# Patient Record
Sex: MALE | Race: WHITE | Employment: FULL TIME | URBAN - METROPOLITAN AREA
[De-identification: names, ages, dates, MRNs, and addresses within clinical notes are randomized per-mention and may not be internally consistent; named-entity substitution may affect disease eponyms.]

---

## 2017-01-01 ENCOUNTER — HOSPITAL ENCOUNTER (OUTPATIENT)
Dept: MRI IMAGING | Age: 57
Discharge: HOME | End: 2017-07-31
Attending: UROLOGY
Payer: COMMERCIAL

## 2017-01-01 ENCOUNTER — NURSE NAVIGATOR (OUTPATIENT)
Dept: PALLATIVE CARE | Age: 57
End: 2017-01-01

## 2017-01-01 ENCOUNTER — HOSPITAL ENCOUNTER (OUTPATIENT)
Dept: NUCLEAR MEDICINE | Age: 57
Discharge: HOME | End: 2017-06-30
Attending: UROLOGY
Payer: COMMERCIAL

## 2017-01-01 ENCOUNTER — DOCUMENTATION ONLY (OUTPATIENT)
Dept: PALLATIVE CARE | Age: 57
End: 2017-01-01

## 2017-01-01 ENCOUNTER — HOSPITAL ENCOUNTER (OUTPATIENT)
Dept: PHYSICAL THERAPY | Age: 57
Discharge: HOME | End: 2017-12-20
Payer: COMMERCIAL

## 2017-01-01 ENCOUNTER — TELEPHONE (OUTPATIENT)
Dept: PALLATIVE CARE | Age: 57
End: 2017-01-01

## 2017-01-01 ENCOUNTER — HOSPITAL ENCOUNTER (OUTPATIENT)
Dept: PHYSICAL THERAPY | Age: 57
Discharge: HOME | End: 2017-12-04
Payer: COMMERCIAL

## 2017-01-01 ENCOUNTER — OFFICE VISIT (OUTPATIENT)
Dept: PALLATIVE CARE | Age: 57
End: 2017-01-01

## 2017-01-01 ENCOUNTER — HOSPITAL ENCOUNTER (OUTPATIENT)
Dept: GENERAL RADIOLOGY | Age: 57
Discharge: HOME | End: 2017-12-20
Payer: COMMERCIAL

## 2017-01-01 ENCOUNTER — HOSPITAL ENCOUNTER (OUTPATIENT)
Dept: GENERAL RADIOLOGY | Age: 57
Discharge: HOME | End: 2017-08-09
Payer: COMMERCIAL

## 2017-01-01 ENCOUNTER — HOSPITAL ENCOUNTER (OUTPATIENT)
Dept: ULTRASOUND IMAGING | Age: 57
Discharge: HOME | End: 2017-08-29
Attending: INTERNAL MEDICINE
Payer: COMMERCIAL

## 2017-01-01 ENCOUNTER — HOME HEALTH ADMISSION (OUTPATIENT)
Dept: HOME HEALTH SERVICES | Facility: HOME HEALTH | Age: 57
End: 2017-01-01

## 2017-01-01 VITALS
OXYGEN SATURATION: 99 % | RESPIRATION RATE: 16 BRPM | WEIGHT: 182.6 LBS | DIASTOLIC BLOOD PRESSURE: 64 MMHG | TEMPERATURE: 98 F | SYSTOLIC BLOOD PRESSURE: 110 MMHG | HEIGHT: 74 IN | BODY MASS INDEX: 23.44 KG/M2 | HEART RATE: 72 BPM

## 2017-01-01 VITALS
HEART RATE: 92 BPM | DIASTOLIC BLOOD PRESSURE: 63 MMHG | SYSTOLIC BLOOD PRESSURE: 108 MMHG | TEMPERATURE: 96.9 F | OXYGEN SATURATION: 95 % | BODY MASS INDEX: 23.44 KG/M2 | HEIGHT: 74 IN | RESPIRATION RATE: 16 BRPM | WEIGHT: 182.6 LBS

## 2017-01-01 VITALS — HEIGHT: 74 IN

## 2017-01-01 DIAGNOSIS — K59.00 CONSTIPATION, UNSPECIFIED CONSTIPATION TYPE: Primary | ICD-10-CM

## 2017-01-01 DIAGNOSIS — G89.3 CANCER RELATED PAIN: ICD-10-CM

## 2017-01-01 DIAGNOSIS — D48.3 NEOPLASM OF UNCERTAIN BEHAVIOR OF RETROPERITONEUM AND PERITONEUM: ICD-10-CM

## 2017-01-01 DIAGNOSIS — R60.9 EDEMA, UNSPECIFIED TYPE: ICD-10-CM

## 2017-01-01 DIAGNOSIS — M79.89 LEG SWELLING: Primary | ICD-10-CM

## 2017-01-01 DIAGNOSIS — D48.4 NEOPLASM OF UNCERTAIN BEHAVIOR OF RETROPERITONEUM AND PERITONEUM: ICD-10-CM

## 2017-01-01 DIAGNOSIS — R11.0 NAUSEA: ICD-10-CM

## 2017-01-01 DIAGNOSIS — R93.41 BLADDER FILLING DEFECT: ICD-10-CM

## 2017-01-01 DIAGNOSIS — K59.00 CONSTIPATION, UNSPECIFIED CONSTIPATION TYPE: ICD-10-CM

## 2017-01-01 DIAGNOSIS — C78.7 LIVER METASTASIS (HCC): ICD-10-CM

## 2017-01-01 DIAGNOSIS — N13.5 RETROPERITONEAL FIBROSIS: ICD-10-CM

## 2017-01-01 DIAGNOSIS — N13.5 RPF (RETROPERITONEAL FIBROSIS): ICD-10-CM

## 2017-01-01 DIAGNOSIS — N13.30 HYDRONEPHROSIS: ICD-10-CM

## 2017-01-01 DIAGNOSIS — K56.609 LARGE BOWEL OBSTRUCTION (HCC): ICD-10-CM

## 2017-01-01 DIAGNOSIS — K59.00 CONSTIPATION: ICD-10-CM

## 2017-01-01 DIAGNOSIS — I82.4Y3 ACUTE DEEP VEIN THROMBOSIS (DVT) OF PROXIMAL VEIN OF BOTH LOWER EXTREMITIES (HCC): ICD-10-CM

## 2017-01-01 DIAGNOSIS — G89.29 OTHER CHRONIC PAIN: ICD-10-CM

## 2017-01-01 DIAGNOSIS — R14.0 ABDOMINAL DISTENTION: ICD-10-CM

## 2017-01-01 DIAGNOSIS — R22.40 LOCALIZED SWELLING, MASS, OR LUMP OF LOWER EXTREMITY, UNSPECIFIED LATERALITY: ICD-10-CM

## 2017-01-01 DIAGNOSIS — K52.9 COLITIS, NONSPECIFIC: ICD-10-CM

## 2017-01-01 DIAGNOSIS — R10.84 GENERALIZED ABDOMINAL PAIN: ICD-10-CM

## 2017-01-01 DIAGNOSIS — C15.5 MALIGNANT NEOPLASM OF LOWER THIRD OF ESOPHAGUS (HCC): Primary | ICD-10-CM

## 2017-01-01 DIAGNOSIS — C15.5 MALIGNANT NEOPLASM OF LOWER THIRD OF ESOPHAGUS (HCC): ICD-10-CM

## 2017-01-01 LAB — CREAT BLD-MCNC: 2 MG/DL (ref 0.6–1.3)

## 2017-01-01 PROCEDURE — 74000 XR ABD (KUB): CPT

## 2017-01-01 PROCEDURE — 97140 MANUAL THERAPY 1/> REGIONS: CPT

## 2017-01-01 PROCEDURE — 78708 K FLOW/FUNCT IMAGE W/DRUG: CPT

## 2017-01-01 PROCEDURE — 74020 XR ABD (AP AND ERECT OR DECUB): CPT

## 2017-01-01 PROCEDURE — 97016 VASOPNEUMATIC DEVICE THERAPY: CPT

## 2017-01-01 PROCEDURE — 72197 MRI PELVIS W/O & W/DYE: CPT

## 2017-01-01 PROCEDURE — 93971 EXTREMITY STUDY: CPT

## 2017-01-01 PROCEDURE — 74011250636 HC RX REV CODE- 250/636

## 2017-01-01 PROCEDURE — A9577 INJ MULTIHANCE: HCPCS | Performed by: UROLOGY

## 2017-01-01 PROCEDURE — 74011250636 HC RX REV CODE- 250/636: Performed by: UROLOGY

## 2017-01-01 PROCEDURE — 82565 ASSAY OF CREATININE: CPT

## 2017-01-01 PROCEDURE — 97162 PT EVAL MOD COMPLEX 30 MIN: CPT

## 2017-01-01 PROCEDURE — 74183 MRI ABD W/O CNTR FLWD CNTR: CPT

## 2017-01-01 RX ORDER — FUROSEMIDE 40 MG/1
1 TABLET ORAL DAILY
Refills: 2 | COMMUNITY
Start: 2017-01-01 | End: 2017-01-01

## 2017-01-01 RX ORDER — FUROSEMIDE 10 MG/ML
20 INJECTION INTRAMUSCULAR; INTRAVENOUS ONCE
Status: COMPLETED | OUTPATIENT
Start: 2017-01-01 | End: 2017-01-01

## 2017-01-01 RX ORDER — OXYCODONE HYDROCHLORIDE 30 MG/1
1 TABLET ORAL
Refills: 0 | COMMUNITY
Start: 2017-01-01 | End: 2018-01-01

## 2017-01-01 RX ORDER — OXYCODONE HCL 20 MG/1
1 TABLET, FILM COATED, EXTENDED RELEASE ORAL EVERY 8 HOURS
Refills: 0 | COMMUNITY
Start: 2017-01-01 | End: 2018-01-01

## 2017-01-01 RX ORDER — OXYCODONE HYDROCHLORIDE 80 MG/1
1 TABLET, FILM COATED, EXTENDED RELEASE ORAL EVERY 8 HOURS
Refills: 0 | COMMUNITY
Start: 2017-01-01 | End: 2018-01-01

## 2017-01-01 RX ORDER — AMOXICILLIN 250 MG
2 CAPSULE ORAL 2 TIMES DAILY
Qty: 120 TAB | Refills: 5 | Status: SHIPPED | OUTPATIENT
Start: 2017-01-01 | End: 2018-01-01

## 2017-01-01 RX ORDER — FUROSEMIDE 10 MG/ML
INJECTION INTRAMUSCULAR; INTRAVENOUS
Status: COMPLETED
Start: 2017-01-01 | End: 2017-01-01

## 2017-01-01 RX ORDER — APIXABAN 5 MG/1
1 TABLET, FILM COATED ORAL 2 TIMES DAILY
Refills: 5 | COMMUNITY
Start: 2017-01-01 | End: 2018-01-01 | Stop reason: SDUPTHER

## 2017-01-01 RX ADMIN — GADOBENATE DIMEGLUMINE 17 ML: 529 INJECTION, SOLUTION INTRAVENOUS at 20:09

## 2017-01-01 RX ADMIN — FUROSEMIDE 20 MG: 10 INJECTION INTRAMUSCULAR; INTRAVENOUS at 12:33

## 2017-01-01 RX ADMIN — FUROSEMIDE 20 MG: 10 INJECTION, SOLUTION INTRAMUSCULAR; INTRAVENOUS at 12:33

## 2017-02-17 ENCOUNTER — HOSPITAL ENCOUNTER (OUTPATIENT)
Dept: ULTRASOUND IMAGING | Age: 57
Discharge: HOME | End: 2017-02-17
Attending: INTERNAL MEDICINE
Payer: COMMERCIAL

## 2017-02-17 DIAGNOSIS — M79.669 PAIN AND SWELLING OF LOWER LEG: ICD-10-CM

## 2017-02-17 DIAGNOSIS — M79.89 PAIN AND SWELLING OF LOWER LEG: ICD-10-CM

## 2017-02-17 PROCEDURE — 93970 EXTREMITY STUDY: CPT

## 2017-09-29 NOTE — PROGRESS NOTES
[facility]  Nursing Note  [phone]  Fax [phone]      Name:  Taylor Villalta  YOB: 1960     We received an outpatient Palliative Medicine referral for Taylor Villalta on 9/29/17  from [facility], MD, Oncology, [place] [address] Oncology. We appreciate this referral.      The patient's case has been reviewed and __x__ Meets / ____ Does not Meet  the following criteria for a initial provider visit:     ____   Care Decisions (including discussions about treatment choices and advance care planning)  __x__   Overwhelming Symptoms (such as pain, fatigue, nausea, loss of appetite)  ____   Psychosocial and spiritual support for patients and families with distress from advanced illness      This patient also ___ Meets / __x_ Does not Meet a priority referral for:     ___   Post discharge follow up  ___   High risk health care needs     This patient's referring provider has been notified of above.        PATIENT DEMOGRAPHICS     Taylor Villalta   [address], [address] [place] [address]  [phone] (wife Laura Sahni)  1960     PRIMARY CARE PROVIDER:  Name: Alan Perry MD    [facility] SOURCE CONTACT INFORMATION:  Name: Dr. [facility], [facility], GI Medical Oncology  Phone Number: [phone]     REASON FOR REFERRAL:  Esophageal cancer with liver mets, large bowel obstruction 9/13/17 (ileostomy 9/15/17), retroperitoneal fibrosis with chronic pain    CURRENT SYMPTOMS:  Abdominal pain    DATE OF MOST RECENT CONTACT WITH A PROVIDER:  Hospitalized at [facility] 9/13-9/27/17    [facility]? Yes, [facility] (admitted 9/27/17)    ACP: none on file    NOTES:  Faxed referral said to contact pt's wife Laura Sahni to schedule the appt. Nurse called and spoke with wife, introduced Palliative Medicine services.   Wife reports that pt has had a complicated course with retroperitoneal fibrosis and pain, and his case was presented to the tumor board at [facility]. Pt now wishes to follow up with Palliative Medicine at [facility] because it is closer to where he lives.     Appt scheduled 10/18/17, 11:30 am at [facility] location with Dr. Jossie Gallegos, MARINAN, RN  Clinical Referral Navigator

## 2017-11-07 NOTE — TELEPHONE ENCOUNTER
Called patient to advise/confirm upcoming appt with Dr. Dr. Monet  on 11/08/17 [facility] 11am Also advised to please bring in patients photo ID/[employer] card and any current pain medication patient is taking to bring in the container with the medication in it.

## 2017-11-08 NOTE — PATIENT INSTRUCTIONS
Dear Layvonne Ormond ,    It was a pleasure seeing you in the Palliative Medicine Office today. This is what we talked about:     1. History  -We reviewed your history in detail, particular of Polyfibromatosis Syndrome  -I mentioned that this reminded me of a patient I see with Erdheim-Adan disease  -You have had metastatic esophageal cancer, for the past year  -This has been responsive to chemotherapy including Herceptin  -The last several months have been extremely difficult with the bilateral nephrostomy tubes as well as the ileostomy  -You are settling into these changes but it is difficult    2. Pain  -You have had chronic pain your entire life with the Polyfibromatosis Syndrome  -You have tried numerous pain therapies  -Currently you are on Oxycontin 100mg every 8 hours and Oxycodone IR 30mg every 4 hours  -The Oxycontin was just increased a few weeks ago  -This is currently managed by Dr. Cuong Valadez    3. Swelling  -The swelling in your legs is a more recent development  -You have a clot in the left leg but the right appears to have resolved  -You are on a blood thinner at this time  -There may be both a blood vessel and lymphatic cause to the swelling  -I am recommending that the Lymphedema Clinic evaluate you and make recommendations  -You are on a diuretic, though need to be careful with this as it can deplete the intravascular volume if the dose is high for an extended period of time  -We discussed possibly getting a bed that adjusts your head and feet for improved comfort with the swelling you have    4.  Advance Care Planning  -We will talk more about this in an upcoming visit    This is what you have shared with us about Alban Thomas. Planning 11/8/2017   Patient's Healthcare Decision Maker is: Verbal statement (Legal Next of Kin remains as decision maker)   Primary Decision Maker Name Bianka [place]   Primary Decision Maker Phone Number [phone]   Primary Decision Maker Relationship to Patient Spouse     5. Palliative Medicine  -We talked about the role of Palliative Medicine  -I will be obtaining and reviewing more records and speaking with several of your doctors      The Palliative Medicine Team is here to support you and your family. We will see you again in about a month    If there are any concerns before that time, such as medication questions, worsening symptoms or a need to see a physician for an urgent or emergent situation; please call [phone] (COPE). A physician is also on call after our normal business hours of 8am to 5pm.     In order to serve you better, please allow up to 48 hours for prescription refills to be processed. Certain medications may require more paperwork or a written prescription that you may need to  from the office. We appreciate you letting us know of any refill requests as soon as possible. We also would like you to sign up for [employer] as well. Sincerely,      Esther Oliver MD and the Palliative Medicine Team         Lymphedema: Care Instructions  Your Care Instructions  Lymphedema is fluid that builds up in the arms or legs. It is often caused by surgery to remove lymph nodes during cancer treatment, especially breast cancer surgery, which can cause fluid to build up in the arm. It can happen after radiation treatment to an area that involves lymph nodes. It also can be caused by a fractured bone or surgery to fix a fracture. And some medicines also can cause lymphedema. Some people get it for unknown reasons. Normally, lymph nodes trap bacteria and other substances as fluid flows through them. Then, the white cells in the body's defense, or immune, system can destroy the substances. But if there are few or no lymph nodes-or if the lymph system in an arm or leg has been damaged-fluid can build up in the affected arm or leg. You can take simple steps at home to help treat or prevent fluid buildup.  Treatment may include raising the arm or leg to let gravity drain the fluid. You also can wear compression stockings or sleeves. Follow-up care is a key part of your treatment and safety. Be sure to make and go to all appointments, and call your doctor if you are having problems. It's also a good idea to know your test results and keep a list of the medicines you take. How can you care for yourself at home? · Wear a compression stocking or sleeve as your doctor suggests. It can help keep fluid from pooling in an arm or leg. Wear it during air travel. · Prop up the swollen arm or leg on a pillow anytime you sit or lie down. Try to keep it above the level of your heart. This will help reduce swelling. · Avoid crossing your legs if your legs are swollen. · Get some exercise on most days of the week. Increase the intensity of exercise slowly. Water aerobics can help reduce swelling by helping fluid move around. Wear your compression stocking or sleeve during exercise, but not during water exercise. · See a physical therapist. He or she can teach you how to do self-massage to help fluid move around. You also can learn what activities would be best for you. · Keep your feet clean and wear clean socks or stockings every day. Check your feet often for signs of infection, such as redness or heat. Do not walk barefoot. · If you have had lymph nodes removed from under your arm:  ¨ Do not have blood drawn from the arm on the side of the lymph node surgery. ¨ Do not allow a blood pressure cuff to be placed on that arm. If you are in the hospital, make sure your nurse and other hospital staff know of your condition. ¨ Wear gloves when gardening or doing other activities that may lead to cuts on your fingers or hands. · If you have had lymph nodes removed from your groin:  ¨ Bathe your feet daily in lukewarm, not hot, water. Use a mild soap that has a moisturizer, or use a moisturizer separately.   ¨ Check your feet for blisters or cuts.  ¨ Wear comfortable and supportive shoes that fit properly. ¨ Wear the correct size of panty hose and stockings. Avoid garters or knee-high or thigh-high stockings. · Ask your doctor how to treat any cuts, scratches, insect bites, or other injuries that may occur. · Use sunscreen and insect repellent when outdoors to protect your skin from sunburn and insect bites. · Wear medical alert jewelry that says you have lymphedema. You can buy these at most [employer] and on the Internet. When should you call for help? Call your doctor now or seek immediate medical care if:  ? · You have signs of infection, such as:  ¨ Increased pain, swelling, warmth, or redness. ¨ Red streaks leading from the area. ¨ Pus draining from the area. ¨ A fever. ? Watch closely for changes in your health, and be sure to contact your doctor if:  ? · You have new or worse symptoms from lymphedema. ? · You do not get better as expected. Where can you learn more? Go to http://giancarlo-adelaide.info/. Enter V398 in the search box to learn more about \"Lymphedema: Care Instructions. \"  Current as of: May 12, 2017  Content Version: 11.4  © 0587-1019 [employer]. Care instructions adapted under license by [employer] (which disclaims liability or warranty for this information). If you have questions about a medical condition or this instruction, always ask your healthcare professional. [place] [address] any warranty or liability for your use of this information.

## 2017-11-08 NOTE — PROGRESS NOTES
Palliative Medicine Outpatient Services  [place]: [phone]    Patient Name: Virgil Schwartz  YOB: 1960    Date of Current Visit: 17  Location of Current Visit:    [x] [address] [place] [address] Office    Date of Initial Visit: 17  Requesting Physician: Dr. Carlos Scott  Primary Care Physician: Ozzy Sosa MD      SUMMARY:   Virgil Schwartz is a 61y.o. year old with a past history of polyfibrosis syndrome (see below; contractures, keloids, retroperitoneal fibrosis) and esophageal cancer with liver mets diagnosed in 2016 now s/p bilateral nephrostomy tubes and ileostomy as well as DVT now with extensive lower extremity swelling, who was referred to Palliative Medicine by Erin Hawthorne for unmet palliative care needs. The patients social history includes (see below). Palliative Medicine is addressing the following current patient/family concerns: intensity of life changing illness/procedures, pain and caregiver support. 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 2009; 24(2): 150-841. 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 Its precise pathophysiology or etiology remains unclear.  In addition to distinctive diverse skin manifestations, patients with polyfibromatosis have been previously reported to show erosive arthropathy with significant limitation of movement at affected joints. However, the presence of erosive polyarthropathy in polyfibromatosis has not emphasized in previous cases. Here, we report a case of polyfibromatosis syndrome combined with painless massive structural destruction of hand and foot joints, and review the characteristics of erosive arthropathy in previous cases. PALLIATIVE DIAGNOSES:       ICD-10-CM ICD-9-CM    1. Leg swelling M79.89 729.81 REFERRAL TO LYMPHEDEMA CLINIC   2. Acute deep vein thrombosis (DVT) of proximal vein of both lower extremities (HCC) I82.4Y3 453.41 REFERRAL TO LYMPHEDEMA CLINIC   3. Malignant neoplasm of lower third of esophagus (HCC) C15.5 150.5 REFERRAL TO LYMPHEDEMA CLINIC   4. Liver metastasis (HCC) C78.7 197.7 REFERRAL TO LYMPHEDEMA CLINIC   5. RPF (retroperitoneal fibrosis) N13.5 593.4 REFERRAL TO LYMPHEDEMA CLINIC   6. Other chronic pain G89.29 338.29           PLAN:   Patient Instructions     Dear Aarti Pink ,    It was a pleasure seeing you in the Palliative Medicine Office today. This is what we talked about:     1. History  -We reviewed your history in detail, particular of Polyfibromatosis Syndrome  -I mentioned that this reminded me of a patient I see with Erdheim-Petersburg disease  -You have had metastatic esophageal cancer, for the past year  -This has been responsive to chemotherapy including Herceptin  -The last several months have been extremely difficult with the bilateral nephrostomy tubes as well as the ileostomy  -You are settling into these changes but it is difficult    2. Pain  -You have had chronic pain your entire life with the Polyfibromatosis Syndrome  -You have tried numerous pain therapies  -Currently you are on Oxycontin 100mg every 8 hours and Oxycodone IR 30mg every 4 hours  -The Oxycontin was just increased a few weeks ago  -This is currently managed by Dr. Tariq Garner    3.  Swelling  -The swelling in your legs is a more recent development  -You have a clot in the left leg but the right appears to have resolved  -You are on a blood thinner at this time  -There may be both a blood vessel and lymphatic cause to the swelling  -I am recommending that the Lymphedema Clinic evaluate you and make recommendations  -You are on a diuretic, though need to be careful with this as it can deplete the intravascular volume if the dose is high for an extended period of time  -We discussed possibly getting a bed that adjusts your head and feet for improved comfort with the swelling you have    4. Advance Care Planning  -We will talk more about this in an upcoming visit    Counseling and Coordination: Reviewed records received with summary - see below    [facility] ([facility]) from hospitalization 9/13 to 9/27/17       GOALS OF CARE / TREATMENT PREFERENCES:   [====Goals of Care====]  GOALS OF CARE:  Patient / health care proxy stated goals: I've been in pain for so long, I'd like to keep it manageable    TREATMENT PREFERENCES:   Code Status:  [x] Attempt Resuscitation       [] Do Not Attempt Resuscitation    Advance Care Planning:  Advance Care Planning 11/8/2017   Patient's Healthcare Decision Maker is: Verbal statement (Legal Next of Kin remains as decision maker)   Primary Decision Maker Name Priya Bass   Primary Decision Maker Phone Number [phone]   Primary Decision Maker Relationship to Patient Spouse     The palliative care team has discussed with patient / health care proxy about goals of care / treatment preferences for patient.  [====Goals of Care====]     PRESCRIPTIONS GIVEN:   No orders of the defined types were placed in this encounter.       FOLLOW UP:     Future Appointments  Date Time Provider Ameya Alfonso   12/4/2017 2:00 PM [address] IN CARE:   Patient Care Team:  Tahmina Duran MD as PCP - General (Family Practice)  Ilia Arenas MD as Physician (Hematology)  Domingo Quinteros MD as Physician (Palliative Medicine)    Urology: Dr. Lj Avila 12/18 - about every 3 months; leaking - changed out  Renal Dr. Linsey Wylie     HISTORY:   Nursing documentation from date of visit reviewed. Reviewed patient-completed ESAS and advance care planning form. Reviewed patient record in prescription monitoring program.    CHIEF COMPLAINT: Pain    HPI/SUBJECTIVE:    The patient is: [x] Verbal / [] Nonverbal     Metastatic esophageal cancer diagnosed 9/1/16  Had Chemotherapy and did well; responded  Dr. Sandy Cam at [facility] follows him along with Dr. Kristen Padilla / Mehdi Bolivar  May 2017 - creatinine level elevation  June 2017 - bilateral stents; right first and then left  August 2017 -stents didn't work so had nephrostomy tubes at [facility]   September 2017 -  started having bowel obstruction; small and large bowel  September 2 to [facility] and then September 12 to [facility] until September 27; then 3 days stay in [place] [facility]  They found an inflammatory process throughout pelvis  When performed ileostomy had ascites; malignant ascites  Restarted chemotherapy; 5-FU; Oxaliplatin (itching/rash);  Herceptin  They are replacing Oxaliplatin with a different medication    Life has changed dramatically  Not happy with all the tubes; not easy to do anything   Pain is constant; rectal pain  Pain and discharge from the rectal area; slowly improving  Feels like has something lodged in rectum  Annoying sensation  Improved significantly; slow improvement but is improved  Was in very severe pain in rectum    Each treatment feels he has improved in discomfort  Chemo schedule - 2 day pump so 3 days are hard after chemo  He isn't where he wants to be  It is still an effort to do anything  Getting dressed with all the tubes is difficult  Can't really work in the yard  Clothes can be restrictive  Old overalls; doesn't restrict    Diagnosed with 2 DVTs; severe swelling in both legs  Currently on diuretic and Eloquis  Left knee is hard to bend; without pain and can only bend a particular amount    He has been on medications for chronic pain  But his entire life; he had had keloids that cause pain - burning and sharp severe pain  Found a doctor in [place]; chemo and steroid injections; pulse dye laser   Name Dr. Hughes Severance all the neuropathic medications  Contractures in his 35s of his hands  Surgeries on hands; Dr. Taurus Gee  He has been on opioids chronically    Current pain regimen:  Oxycontin 100mg every 8 hours  Oxy IR 30mg  - 5-6/day regular  Last changed/increased by NP in Dr. Tariq Garner last week  Some days are better than others  Better than he was     Clinical Pain Assessment (nonverbal scale for nonverbal patients):   [++++ Clinical Pain Assessment++++]  [++++Pain Severity++++]: Pain: 7  [++++Pain Character++++]: aching, sharp  [++++Pain Duration++++]: minutes to hours  [++++Pain Effect++++]: limits function  [++++Pain Factors++++]: how tubing is lying on body  [++++Pain Frequency++++]: hourly/daily  [++++Pain Location++++]: bilateral flank; hands (contractures); bilateral legs (edema)  [++++ Clinical Pain Assessment++++]     FUNCTIONAL ASSESSMENT:     Palliative Performance Scale (PPS):  PPS: 80     PSYCHOSOCIAL/SPIRITUAL SCREENING:     Any spiritual / Baptism concerns:  [] Yes /  [x] No    Caregiver Burnout:  [] Yes /  [x] No /  [] No Caregiver Present      Anticipatory grief assessment:   [x] Normal  / [] Maladaptive       ESAS Anxiety: Anxiety: 0    ESAS Depression: Depression: 1     REVIEW OF SYSTEMS:     The following systems were [x] reviewed / [] unable to be reviewed  Systems: constitutional, ears/nose/mouth/throat, respiratory, gastrointestinal, genitourinary, musculoskeletal, integumentary, neurologic, psychiatric, endocrine. Positive findings noted below.   Modified ESAS Completed by: provider   Fatigue: 0 Drowsiness: 0   Depression: 1 Pain: 7   Anxiety: 0 Nausea: 0   Anorexia: 0 Dyspnea: 0   Best Well-Bein Constipation: No   Other Problem (Comment): 6        PHYSICAL EXAM:     Wt Readings from Last 3 Encounters:   17 182 lb 9.6 oz (82.8 kg)   11 209 lb 14.1 oz (95.2 kg)     Blood pressure 110/64, pulse 72, temperature 98 °F (36.7 °C), temperature source Oral, resp. rate 16, height 6' 2\" (1.88 m), weight 182 lb 9.6 oz (82.8 kg), SpO2 99 %. Last bowel movement: See Nursing Note    Constitutional: alert, oriented, uncomfortable at times  Eyes: pupils equal, anicteric  ENMT: no nasal discharge, moist mucous membranes  Cardiovascular: regular rhythm, distal pulses intact  Respiratory: breathing not labored, symmetric, clear bilaterally  Gastrointestinal: soft non-tender, +bowel sounds, ileostomy right lower quadrant, bilateral nephrostomy tubes in flank region  Musculoskeletal: no deformity, no tenderness to palpation, extensive edema bilateral lower extremities through to thighs (left > right)  Skin: warm, dry  Neurologic: following commands, moving all extremities, contractures of hand bilaterally (prior surgical releases)  Psychiatric: full affect, no hallucinations  Other:     HISTORY:     Past Medical History:   Diagnosis Date    Other ill-defined conditions(929.47)     polyfibromitosis      Past Surgical History:   Procedure Laterality Date    HX ORTHOPAEDIC      hand surgery      History reviewed. No pertinent family history. History reviewed, no pertinent family history. Social History   Substance Use Topics    Smoking status: Never Smoker    Smokeless tobacco: Never Used    Alcohol use No     Allergies   Allergen Reactions    Levaquin [Levofloxacin] Nausea and Vomiting      Current Outpatient Prescriptions   Medication Sig    oxyCODONE IR (ROXICODONE) 30 mg immediate release tablet Take 1 Tab by mouth every four (4) hours as needed.  oxyCODONE ER (OXYCONTIN) 80 mg ER tablet Take 1 Tab by mouth every eight (8) hours.     oxyCODONE ER (OXYCONTIN) 20 mg ER tablet Take 1 Tab by mouth every eight (8) hours.  ELIQUIS 5 mg tablet Take 1 Tab by mouth two (2) times a day.  furosemide (LASIX) 40 mg tablet Take 1 Tab by mouth daily. No current facility-administered medications for this visit.        LAB DATA REVIEWED:     No results found for: WBC, HGB, PLT, HGBEXT, PLTEXT, HGBEXT, PLTEXT  No results found for: NA, K, CL, CO2, BUN, CREA, CA, MG, PHOS   No results found for: SGOT, GPT, AP, TBIL, TP, ALB, GLOB, GGT  No results found for: INR, PTMR, PTP, PT1, PT2, APTT   No results found for: IRON, FE, TIBC, IBCT, PSAT, FERR        CONTROLLED SUBSTANCES SAFETY ASSESSMENT (IF ON CONTROLLED SUBSTANCES):   Not currently prescribing patient's opioid medications    Reviewed opioid safety handout:  [] Yes   [] No  24 hour opioid dose >150mg morphine equivalent/day:  [] Yes   [] No  Benzodiazepines:  [] Yes   [] No  Sleep apnea:  [] Yes   [] No  Urine Toxicology Testing within last 6 months:  [] Yes   [] No  History of or new aberrant medication taking behaviors:  [] Yes   [] No          Total time: 70min  Counseling / coordination time: 55min  > 50% counseling / coordination?: yes re disease process, support

## 2017-11-08 NOTE — MR AVS SNAPSHOT
Visit Information Date & Time Provider Department Dept. Phone Encounter #  
 11/8/2017 11:00 AM Christy Green, [address] [address] [address] [id number] Upcoming Health Maintenance Date Due Hepatitis C Screening 1960 Pneumococcal 19-64 Highest Risk (1 of 3 - PCV13) 5/10/1978 DTaP/Tdap/Td series (1 - Tdap) 5/10/1980 FOBT Q 1 YEAR AGE 50-75 5/10/2009 Influenza Age 5 to Adult 8/1/2017 Allergies as of 11/8/2017  Review Complete On: 11/8/2017 By: Christophe Tsang LPN Severity Noted Reaction Type Reactions Levaquin [Levofloxacin]  04/08/2011    Nausea and Vomiting Current Immunizations  Never Reviewed No immunizations on file. Not reviewed this visit Vitals BP Pulse Temp Resp Height(growth percentile) Weight(growth percentile) 110/64 (BP 1 Location: Right arm, BP Patient Position: Sitting) 72 98 °F (36.7 °C) (Oral) 16 6' 2\" (1.88 m) 182 lb 9.6 oz (82.8 kg) SpO2 BMI Smoking Status 99% 23.44 kg/m2 Never Smoker BMI and BSA Data Body Mass Index Body Surface Area  
 23.44 kg/m 2 2.08 m 2 Your Updated Medication List  
  
   
This list is accurate as of: 11/8/17  1:01 PM.  Always use your most recent med list.  
  
  
  
  
 Gato Gutierrez 100 mcg/hr [facility] Generic drug:  fentaNYL  
1 Patch by TransDERmal route every seventy-two (72) hours. ELIQUIS 5 mg tablet Generic drug:  apixaban Take 1 Tab by mouth two (2) times a day. furosemide 40 mg tablet Commonly known as:  LASIX Take 1 Tab by mouth daily. * oxyCODONE IR 5 mg immediate release tablet Commonly known as:  [place] Gun Take 5 mg by mouth every four (4) hours as needed. * oxyCODONE ER 80 mg ER tablet Commonly known as:  OxyCONTIN Take 1 Tab by mouth every eight (8) hours. * oxyCODONE ER 20 mg ER tablet Commonly known as:  OxyCONTIN Take 1 Tab by mouth every eight (8) hours. * oxyCODONE IR 30 mg immediate release tablet Commonly known as:  Angelica Boers Take 1 Tab by mouth every four (4) hours as needed. oxyCODONE-acetaminophen  mg per tablet Commonly known as:  PERCOCET Take 1 Tab by mouth every six (6) hours as needed for Pain. VALIUM PO Take  by mouth. * Notice: This list has 4 medication(s) that are the same as other medications prescribed for you. Read the directions carefully, and ask your doctor or other care provider to review them with you. Patient Instructions Dear Rosalina Graham , It was a pleasure seeing you in the Palliative Medicine Office today. This is what we talked about: 1. History 
-We reviewed your history in detail, particular of Polyfibromatosis Syndrome 
-I mentioned that this reminded me of a patient I see with [place] disease 
-You have had metastatic esophageal cancer, for the past year 
-This has been responsive to chemotherapy including Herceptin 
-The last several months have been extremely difficult with the bilateral nephrostomy tubes as well as the ileostomy 
-You are settling into these changes but it is difficult 2. Pain 
-You have had chronic pain your entire life with the Polyfibromatosis Syndrome 
-You have tried numerous pain therapies 
-Currently you are on Oxycontin 100mg every 8 hours and Oxycodone IR 30mg every 4 hours 
-The Oxycontin was just increased a few weeks ago 
-This is currently managed by Dr. Misael Maravilla 3.  Swelling 
-The swelling in your legs is a more recent development 
-You have a clot in the left leg but the right appears to have resolved 
-You are on a blood thinner at this time 
-There may be both a blood vessel and lymphatic cause to the swelling 
-I am recommending that the Lymphedema Clinic evaluate you and make recommendations 
-You are on a diuretic, though need to be careful with this as it can deplete the intravascular volume if the dose is high for an extended period of time 
-We discussed possibly getting a bed that adjusts your head and feet for improved comfort with the swelling you have 4. Advance Care Planning 
-We will talk more about this in an upcoming visit This is what you have shared with us about Advance Care Planning Advance Care Planning 11/8/2017 Patient's Healthcare Decision Maker is: Verbal statement (Legal Next of Kin remains as decision maker) Primary Decision Maker Name Taco Early Primary Decision Maker Phone Number [phone] Primary Decision Maker Relationship to Patient Spouse 5. Palliative Medicine 
-We talked about the role of Palliative Medicine 
-I will be obtaining and reviewing more records and speaking with several of your doctors The Palliative Medicine Team is here to support you and your family. We will see you again in about a month If there are any concerns before that time, such as medication questions, worsening symptoms or a need to see a physician for an urgent or emergent situation; please call [phone] (COPE). A physician is also on call after our normal business hours of 8am to 5pm.  
 
In order to serve you better, please allow up to 48 hours for prescription refills to be processed. Certain medications may require more paperwork or a written prescription that you may need to  from the office. We appreciate you letting us know of any refill requests as soon as possible. We also would like you to sign up for [employer] as well. Sincerely, Sharyle Else, MD and the Palliative Medicine Team 
 
 
  
Lymphedema: Care Instructions Your Care Instructions Lymphedema is fluid that builds up in the arms or legs. It is often caused by surgery to remove lymph nodes during cancer treatment, especially breast cancer surgery, which can cause fluid to build up in the arm.  It can happen after radiation treatment to an area that involves lymph nodes. It also can be caused by a fractured bone or surgery to fix a fracture. And some medicines also can cause lymphedema. Some people get it for unknown reasons. Normally, lymph nodes trap bacteria and other substances as fluid flows through them. Then, the white cells in the body's defense, or immune, system can destroy the substances. But if there are few or no lymph nodes-or if the lymph system in an arm or leg has been damaged-fluid can build up in the affected arm or leg. You can take simple steps at home to help treat or prevent fluid buildup. Treatment may include raising the arm or leg to let gravity drain the fluid. You also can wear compression stockings or sleeves. Follow-up care is a key part of your treatment and safety. Be sure to make and go to all appointments, and call your doctor if you are having problems. It's also a good idea to know your test results and keep a list of the medicines you take. How can you care for yourself at home? · Wear a compression stocking or sleeve as your doctor suggests. It can help keep fluid from pooling in an arm or leg. Wear it during air travel. · Prop up the swollen arm or leg on a pillow anytime you sit or lie down. Try to keep it above the level of your heart. This will help reduce swelling. · Avoid crossing your legs if your legs are swollen. · Get some exercise on most days of the week. Increase the intensity of exercise slowly. Water aerobics can help reduce swelling by helping fluid move around. Wear your compression stocking or sleeve during exercise, but not during water exercise. · See a physical therapist. He or she can teach you how to do self-massage to help fluid move around. You also can learn what activities would be best for you. · Keep your feet clean and wear clean socks or stockings every day.  Check your feet often for signs of infection, such as redness or heat. Do not walk barefoot. · If you have had lymph nodes removed from under your arm: ¨ Do not have blood drawn from the arm on the side of the lymph node surgery. ¨ Do not allow a blood pressure cuff to be placed on that arm. If you are in the hospital, make sure your nurse and other hospital staff know of your condition. ¨ Wear gloves when gardening or doing other activities that may lead to cuts on your fingers or hands. · If you have had lymph nodes removed from your groin: ¨ Bathe your feet daily in lukewarm, not hot, water. Use a mild soap that has a moisturizer, or use a moisturizer separately. ¨ Check your feet for blisters or cuts. ¨ Wear comfortable and supportive shoes that fit properly. ¨ Wear the correct size of panty hose and stockings. Avoid garters or knee-high or thigh-high stockings. · Ask your doctor how to treat any cuts, scratches, insect bites, or other injuries that may occur. · Use sunscreen and insect repellent when outdoors to protect your skin from sunburn and insect bites. · Wear medical alert jewelry that says you have lymphedema. You can buy these at most drugstores and on the Internet. When should you call for help? Call your doctor now or seek immediate medical care if: 
? · You have signs of infection, such as: 
¨ Increased pain, swelling, warmth, or redness. ¨ Red streaks leading from the area. ¨ Pus draining from the area. ¨ A fever. ? Watch closely for changes in your health, and be sure to contact your doctor if: 
? · You have new or worse symptoms from lymphedema. ? · You do not get better as expected. Where can you learn more? Go to http://giancarlo-adelaide.info/. Enter V398 in the search box to learn more about \"Lymphedema: Care Instructions. \" Current as of: May 12, 2017 Content Version: 11.4 © 9054-9668 Healthwise, [employer].  Care instructions adapted under license by [address] (which disclaims liability or warranty for this information). If you have questions about a medical condition or this instruction, always ask your healthcare professional. [address] any warranty or liability for your use of this information. Introducing [facility]! Robert Beltre introduces [employer] patient portal. Now you can access parts of your medical record, email your doctor's office, and request medication refills online. 1. In your internet browser, go to [email or web address] 2. Click on the First Time User? Click Here link in the Sign In box. You will see the New Member Sign Up page. 3. Enter your [employer] Access Code exactly as it appears below. You will not need to use this code after youve completed the sign-up process. If you do not sign up before the expiration date, you must request a new code. · [employer] Access Code: [email or web address] Expires: 2/6/2018 12:59 PM 
 
4. Enter the last four digits of your Social Security Number (xxxx) and Date of Birth (mm/dd/yyyy) as indicated and click Submit. You will be taken to the next sign-up page. 5. Create a [employer] ID. This will be your [employer] login ID and cannot be changed, so think of one that is secure and easy to remember. 6. Create a [employer] password. You can change your password at any time. 7. Enter your Password Reset Question and Answer. This can be used at a later time if you forget your password. 8. Enter your e-mail address. You will receive e-mail notification when new information is available in [address] 9. Click Sign Up. You can now view and download portions of your medical record. 10. Click the Download Summary menu link to download a portable copy of your medical information. If you have questions, please visit the Frequently Asked Questions section of the [employer] website.  Remember, [employer] is NOT to be used for urgent needs. For medical emergencies, dial 911. Now available from your iPhone and Android! Please provide this summary of care documentation to your next provider. Your primary care clinician is listed as Yohan Wei. If you have any questions after today's visit, please call [phone].

## 2017-11-08 NOTE — PROGRESS NOTES
Palliative Medicine Office Visit  Palliative Medicine Nurse Check In  [phone])    Patient Name: Racheal Solo  YOB: 1960      Date of Office Visit: 11/8/2017      Patient states: \" I was referred by the Oncologist while at [facility]". From Check In Sheet (scanned in Media):  Has a medical provider talked with you about cardiopulmonary resuscitation (CPR)? [x] Yes   [] No   [] Unable to obtain    Nurse reminder to complete or update ACP FlowSheet:    Is ACP on the Problem List?    [] Yes    [x] No  IF ACP Document is ON FILE; Nurse to place ACP on Problem List     Is there an ACP Note in Chart Review/Note? [] Yes    [x] No   If NO: [address] 11/8/2017   Patient's Healthcare Decision Maker is: Verbal statement (Legal Next of Kin remains as decision maker)   Primary Decision Maker Name Priya Bass   Primary Decision Maker Phone Number [phone]   Primary Decision Maker Relationship to Patient Spouse       Is there anything that we should know about you as a person in order to provide you the best care possible? He would like to know how this all works in managing his condition? Have you been to the ER, urgent care clinic since your last visit? [] Yes   [x] No   [] Unable to obtain    Have you been hospitalized since your last visit? [] Yes   [x] No   [] Unable to obtain    Have you seen or consulted any other health care providers outside of the [facility] since your last visit? [] Yes   [x] No   [] Unable to obtain    Functional status (describe):         Last BM: Has a Ileostomy     accessed (date): 11/7/17    Bottle review (for opioid pain medication): Patient did not bring medication with.   Medication 1:   Date filled:   Directions:   # filled:   # left:   # pills taking per day:  Last dose taken:    Medication 2:   Date filled:   Directions:   # filled:   # left:   # pills taking per day:  Last dose taken:    Medication 3:   Date filled:   Directions:   # filled:   # left:   # pills taking per day:  Last dose taken:    Medication 4:   Date filled:   Directions:   # filled:   # left:   # pills taking per day:  Last dose taken:

## 2017-11-13 NOTE — TELEPHONE ENCOUNTER
Returned call to Flor Ramirez, she was made aware that the Nurse Elton Gill placed the order for the Lymphedema Clinic and made a call to. She left a V/M to call Palliative to advise us on when someone will be scheduling the appointment. The wife was told someone will be calling her back once we here back from the Lymphedema Clinic. She verbalized understanding of.

## 2017-11-13 NOTE — PROGRESS NOTES
Palliative Medicine  Nursing Note  [phone]  Fax [phone]    Clinic Office Visit  Patient Name: Eunice Summers  YOB: 1960  11/13/2017    Dr. Lisa Oneill 's After Visit Summary (AVS) reviewed with patient and wife. They have no questions at this time. Pt's pain medications are currently being managed by Dr. Tanja Swift. No prescriptions given today. Provided Welcome New Patient Packet- Opioid Safety Sheet, Palliative Medicine Introduction Sheet, Welcome Letter, Advance Care Planning information, magnet with on-call phone number and information. Reviewed with pt and wife. Patient verbalized understanding that if any questions or concerns should arise prior to next office visit, patient/family member will call the Palliative Medicine office at [phone] 24 hours a day 7 days a week. Follow up appointment to be scheduled:  Dr. Lisa Oneill is going to add on a clinic day in Dec.  Date has not yet been confirmed. When date is set, will contact pt to schedule. Nurse Navigator to provide a post follow up phone call in approximately: 2 weeks    Nurse called Lymphedema Clinic, [phone]. Referral entered in [facility] [address] [facility]. They will call pt to schedule an appt.     Emile Cuenca RN-BC  Palliative Medicine

## 2017-11-13 NOTE — TELEPHONE ENCOUNTER
Patient's wife called. Said we were to call her with an appointment for patient to go to Lymphedema clinic but she hasn't heard from anyone. Please call her with appointment.

## 2017-11-14 PROBLEM — C78.7 LIVER METASTASIS (HCC): Status: ACTIVE | Noted: 2017-01-01

## 2017-11-14 PROBLEM — G89.29 OTHER CHRONIC PAIN: Status: ACTIVE | Noted: 2017-01-01

## 2017-11-14 PROBLEM — I89.0 LYMPHEDEMA: Status: ACTIVE | Noted: 2017-01-01

## 2017-11-14 PROBLEM — C15.5 MALIGNANT NEOPLASM OF LOWER THIRD OF ESOPHAGUS (HCC): Status: ACTIVE | Noted: 2017-01-01

## 2017-11-14 PROBLEM — N13.5: Status: ACTIVE | Noted: 2017-01-01

## 2017-11-14 PROBLEM — I82.4Y3 ACUTE DEEP VEIN THROMBOSIS (DVT) OF PROXIMAL VEIN OF BOTH LOWER EXTREMITIES (HCC): Status: ACTIVE | Noted: 2017-01-01

## 2017-12-11 NOTE — TELEPHONE ENCOUNTER
Returned call to Nancy with [facility] left a V/M to call the office back.

## 2017-12-11 NOTE — TELEPHONE ENCOUNTER
Rolo Maldonado returned call stated she faxed over Rx for compression device on Friday it needs to be signed by DR Chiki Manley. She was told that I will have DR Chiki Manley review it and fax back it back to [phone].

## 2017-12-11 NOTE — TELEPHONE ENCOUNTER
Follow up call was made to [facility] with [facility] left V/M  X-2 to call the office.

## 2017-12-12 NOTE — TELEPHONE ENCOUNTER
Called pt to advise/confirm of appt with Dr. Siri Santacruz on 12/13*17 at 12:00pm.  Pt confirmed. Also advised to please bring in your Drivers License and Insurance Card with you to appointment as well as any prescription pain medication in the original container with you to appt.

## 2017-12-13 NOTE — PATIENT INSTRUCTIONS
Dear Kamilah Ibarra ,    It was a pleasure seeing you in the Palliative Medicine Office today. This is what we talked about:     1. Disease  -You saw Dr. Wil Saenz at [facility] had good news of with the results of the CT scan with a decrease in size of the liver lesions and lower tumor markers  -Your kidney function is up a little bit but Dr. Mckinnon Sees    2. Pelvic pressure  -This is the greatest issue for you  -There is a wide fluctuation of this causing an impact on quality of life  -Rectal pressure is part of the symptoms as well as with some discharge  -There seems to be a motility delay with your intestines; where stool may be building up and then a large amount may be releasing  -You are still having rectal discharge despite not having much stool through this area  -You have the most pressure with sitting and standing; but this has been better than it has but still is a huge issue for quality of life   -We talked about restarting Senna-S 2 tabs twice a day as a trial  -We will do a basic xray just to see if there is a lot of stool in the bowel  -You are very interested in acupuncture  -You were switched to [address] but didn't seem to do well with this and went back to Oxycontin    3. Nephrostomy tubes  -These are scheduled to be changed by Dr. Bulmaro Hansen on 12/18/17  -You had some pain with the flushing of the tubes but this resolved itself    4. Swelling  -This has been better  -The swelling has decreased with the chemotherapy  -It maybe possible that the chemotherapy is affecting the lymphedema  -You are not taking the Lasix at this time  -You are having special compression stockings made and a device to help with this    5.  Advance Care Planning  -We will talk more about this in an upcoming visit    This is what you have shared with us about Alban Thomas. Planning 11/8/2017   Patient's Healthcare Decision Maker is: Verbal statement (Legal Next of Kin remains as decision maker)   Primary Decision Maker Name Fede Gautam   Primary Decision Maker Phone Number [phone]   Primary Decision Maker Relationship to Patient Spouse     The Palliative Medicine Team is here to support you and your family. We will see you again in about a month    If there are any concerns before that time, such as medication questions, worsening symptoms or a need to see a physician for an urgent or emergent situation; please call [phone] (COPE). A physician is also on call after our normal business hours of 8am to 5pm.     In order to serve you better, please allow up to 48 hours for prescription refills to be processed. Certain medications may require more paperwork or a written prescription that you may need to  from the office. We appreciate you letting us know of any refill requests as soon as possible. We also would like you to sign up for Dewayne as well.     Sincerely,      Gael Murry MD and the Palliative Medicine Team

## 2017-12-13 NOTE — PROGRESS NOTES
Palliative Medicine Outpatient Services  [place]: [phone] ([phone])    Patient Name: Brii Patel  YOB: 1960    Date of Current Visit: 12/13/17  Location of Current Visit:    [] [facility] Office    Date of Initial Visit: 11/8/17  Requesting Physician: Dr. Ross Cuevas  Primary Care Physician: Manuel Dasilva MD      SUMMARY:   Brii Patel is a 61y.o. year old with a past history of polyfibrosis syndrome (see below; contractures, keloids, retroperitoneal fibrosis) and esophageal cancer with liver mets diagnosed in 2016 now s/p bilateral nephrostomy tubes and ileostomy as well as DVT now with extensive lower extremity swelling, who was referred to Palliative Medicine by Jennifer Sanders for unmet palliative care needs. The patients social history includes (see below). Palliative Medicine is addressing the following current patient/family concerns: intensity of life changing illness/procedures, pain and caregiver support. PALLIATIVE DIAGNOSES:       ICD-10-CM ICD-9-CM    1. Constipation, unspecified constipation type K59.00 564.00 XR ABD (KUB)   2. Edema, unspecified type R60.9 782.3    3. RPF (retroperitoneal fibrosis) N13.5 593.4    4. Other chronic pain G89.29 338.29       PLAN:     Patient Instructions     Dear Brii Patel ,    It was a pleasure seeing you in the Palliative Medicine Office today. This is what we talked about:     1. Disease  -You saw Dr. Zeenat Guerrier at [facility] had good news of with the results of the CT scan with a decrease in size of the liver lesions and lower tumor markers  -Your kidney function is up a little bit but Dr. Ross Cuevas    2.  Pelvic pressure  -This is the greatest issue for you  -There is a wide fluctuation of this causing an impact on quality of life  -Rectal pressure is part of the symptoms as well as with some discharge  -There seems to be a motility delay with your intestines; where stool may be building up and then a large amount may be releasing  -You are still having rectal discharge despite not having much stool through this area  -You have the most pressure with sitting and standing; but this has been better than it has but still is a huge issue for quality of life   -We talked about restarting Senna-S 2 tabs twice a day as a trial  -We will do a basic xray just to see if there is a lot of stool in the bowel  -You are very interested in acupuncture  -You were switched to [address] but didn't seem to do well with this and went back to Oxycontin    3. Nephrostomy tubes  -These are scheduled to be changed by Dr. Suanne Galeazzi on 12/18/17  -You had some pain with the flushing of the tubes but this resolved itself    4. Swelling  -This has been better  -The swelling has decreased with the chemotherapy  -It maybe possible that the chemotherapy is affecting the lymphedema  -You are not taking the Lasix at this time  -You are having special compression stockings made and a device to help with this    5.  Advance Care Planning  -We will talk more about this in an upcoming visit     [address] / TREATMENT PREFERENCES:   [====Goals of Care====]  GOALS OF CARE:  Patient / health care proxy stated goals: I've been in pain for so long, I'd like to keep it manageable    TREATMENT PREFERENCES:   Code Status:  [x] Attempt Resuscitation       [] Do Not Attempt Resuscitation    Advance Care Planning:  Advance Care Planning 11/8/2017   Patient's Healthcare Decision Maker is: Verbal statement (Legal Next of Kin remains as decision maker)   Primary Decision Maker Name Karen Cardoso   Primary Decision Maker Phone Number [phone]   Primary Decision Maker Relationship to Patient Spouse     The palliative care team has discussed with patient / health care proxy about goals of care / treatment preferences for patient.  [====Goals of Care====]     PRESCRIPTIONS GIVEN:     Medications Ordered Today   Medications    senna-docusate (PERICOLACE) 8.6-50 mg per tablet     Sig: Take 2 Tabs by mouth two (2) times a day for 120 days. Dispense:  120 Tab     Refill:  5       FOLLOW UP:     Future Appointments  Date Time Provider Ameya Alfonso   2017 10:00 AM Steve Kean[address]vamsi 32   1/10/2018 2:00 PM Steve Montoya [address]   1/15/2018 2:00 PM [facility]   2018 1:30 PM Alex Nieves MD 34 [facility] IN CARE:   Patient Care Team:  Preet Fournier MD as PCP - General (Family Practice)  Ilda Zamudio MD as Physician (Hematology)  Alex Nieves MD as Physician (Palliative Medicine)    Urology: Dr. Graham Coley  - about every 3 months; leaking - changed out  Renal Dr. Eulalia Stauffer     HISTORY:   Nursing documentation from date of visit reviewed. Reviewed patient-completed ESAS and advance care planning form. Reviewed patient record in prescription monitoring program.    CHIEF COMPLAINT: Pain    HPI/SUBJECTIVE:    The patient is: [x] Verbal / [] Nonverbal     See Patient instructions above  Focus is pelvic discomfort; somewhat relieved by bowel movements but only occur after chemo; seem to be stimulated by chemo  Edema seems to be improving from chemo  Asked about holding chemo prior to holiday; discussed focus on quality of life and goals as major decision point for other things  Possibly developing a plan in case there is a problem and he is in a remove area    From Previous Visits:    Born in [facility]  2 brothers and a sister; father ; mother is 80     2 daughters; [employer] kw[employer]; another [address] [facility]  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 2009; 24(2): 753-453.   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 Its precise pathophysiology or etiology remains unclear. In addition to distinctive diverse skin manifestations, patients with polyfibromatosis have been previously reported to show erosive arthropathy with significant limitation of movement at affected joints. However, the presence of erosive polyarthropathy in polyfibromatosis has not emphasized in previous cases. Here, we report a case of polyfibromatosis syndrome combined with painless massive structural destruction of hand and foot joints, and review the characteristics of erosive arthropathy in previous cases. Metastatic esophageal cancer diagnosed 9/1/16  Had Chemotherapy and did well; responded  Dr. Marva Young at [facility] follows him along with Dr. Evans Allen / Hoang Stauffer  May 2017 - creatinine level elevation  June 2017 - bilateral stents; right first and then left  August 2017 -stents didn't work so had nephrostomy tubes at [facility]   September 2017 -  started having bowel obstruction; small and large bowel  September 2 to [place] and then September 12 to [facility] until September 27; then 3 days stay in [place]  They found an inflammatory process throughout pelvis  When performed ileostomy had ascites; malignant ascites  Restarted chemotherapy; 5-FU; Oxaliplatin (itching/rash);  Herceptin  They are replacing Oxaliplatin with a different medication    Life has changed dramatically  Not happy with all the tubes; not easy to do anything   Pain is constant; rectal pain  Pain and discharge from the rectal area; slowly improving  Feels like has something lodged in rectum  Annoying sensation  Improved significantly; slow improvement but is improved  Was in very severe pain in rectum    He isn't where he wants to be  It is still an effort to do anything  Getting dressed with all the tubes is difficult  Can't really work in the rd  [address] [place] [address] Avenue can be restrictive  Old overalls; doesn't restrict    Diagnosed with 2 DVTs; severe swelling in both legs  Currently on diuretic and Eloquis  Left knee is hard to bend; without pain and can only bend a particular amount    He has been on medications for chronic pain  But his entire life; he had had keloids that cause pain - burning and sharp severe pain  Found a doctor in [facility]; chemo and steroid injections; pulse dye laser   Name Dr. Hughes Severance all the neuropathic medications  Contractures in his 35s of his hands  Surgeries on hands; Dr. Taurus Gee  He has been on opioids chronically    Current pain regimen:  Oxycontin 100mg (80 + 20) every 8 hours  Oxy IR 30mg  - 5-6/day regular  [facility] but did not notice any improvement; went back to Oxycontin    Clinical Pain Assessment (nonverbal scale for nonverbal patients):   [++++ Clinical Pain Assessment++++]  [++++Pain Severity++++]: Pain: 5  [++++Pain Character++++]: aching, sharp  [++++Pain Duration++++]: minutes to hours  [++++Pain Effect++++]: limits function  [++++Pain Factors++++]: how tubing is lying on body; pressure in rectum/pelvis  [++++Pain Frequency++++]: hourly/daily  [++++Pain Location++++]: bilateral flank; hands (contractures); bilateral legs (edema)  [++++ Clinical Pain Assessment++++]     FUNCTIONAL ASSESSMENT:     Palliative Performance Scale (PPS):  PPS: 80     PSYCHOSOCIAL/SPIRITUAL SCREENING:     Any spiritual / Orthodoxy concerns:  [] Yes /  [x] No    Caregiver Burnout:  [] Yes /  [x] No /  [] No Caregiver Present      Anticipatory grief assessment:   [x] Normal  / [] Maladaptive       ESAS Anxiety: Anxiety: 0    ESAS Depression: Depression: 1     REVIEW OF SYSTEMS:     The following systems were [x] reviewed / [] unable to be reviewed  Systems: constitutional, ears/nose/mouth/throat, respiratory, gastrointestinal, genitourinary, musculoskeletal, integumentary, neurologic, psychiatric, endocrine. Positive findings noted below. Modified ESAS Completed by: provider   Fatigue: 1 Drowsiness: 1   Depression: 1 Pain: 5   Anxiety: 0 Nausea: 0   Anorexia: 0 Dyspnea: 0   Best Well-Bein Constipation: No   Other Problem (Comment): 7        PHYSICAL EXAM:     Wt Readings from Last 3 Encounters:   17 182 lb 9.6 oz (82.8 kg)   17 182 lb 9.6 oz (82.8 kg)   11 209 lb 14.1 oz (95.2 kg)     Blood pressure 108/63, pulse 92, temperature 96.9 °F (36.1 °C), temperature source Oral, resp. rate 16, height 6' 2\" (1.88 m), weight 182 lb 9.6 oz (82.8 kg), SpO2 95 %. Last bowel movement: See Nursing Note    Constitutional: alert, oriented, uncomfortable at times  Eyes: pupils equal, anicteric  ENMT: no nasal discharge, moist mucous membranes  Cardiovascular: regular rhythm, distal pulses intact  Respiratory: breathing not labored, symmetric, clear bilaterally  Gastrointestinal: soft non-tender, +bowel sounds, ileostomy right lower quadrant, bilateral nephrostomy tubes in flank region  Musculoskeletal: no deformity, no tenderness to palpation, edema bilateral lower extremities through to thighs (left > right)  Skin: warm, dry  Neurologic: following commands, moving all extremities, contractures of hand bilaterally (prior surgical releases)  Psychiatric: full affect, no hallucinations  Other:     HISTORY:     Past Medical History:   Diagnosis Date    Other ill-defined conditions(164.89)     polyfibromitosis      Past Surgical History:   Procedure Laterality Date    HX ORTHOPAEDIC      hand surgery      No family history on file. History reviewed, no pertinent family history.   Social History   Substance Use Topics    Smoking status: Never Smoker    Smokeless tobacco: Never Used    Alcohol use No     Allergies   Allergen Reactions    Levaquin [Levofloxacin] Nausea and Vomiting      Current Outpatient Prescriptions   Medication Sig    senna-docusate (PERICOLACE) 8.6-50 mg per tablet Take 2 Tabs by mouth two (2) times a day for 120 days.  oxyCODONE IR (ROXICODONE) 30 mg immediate release tablet Take 1 Tab by mouth every four (4) hours as needed.  oxyCODONE ER (OXYCONTIN) 80 mg ER tablet Take 1 Tab by mouth every eight (8) hours.  oxyCODONE ER (OXYCONTIN) 20 mg ER tablet Take 1 Tab by mouth every eight (8) hours.  ELIQUIS 5 mg tablet Take 1 Tab by mouth two (2) times a day. No current facility-administered medications for this visit. LAB DATA REVIEWED:   12/13/17  See Media;  Reviewed blood work done by Mehdi Bolivar / Dr. Jackie Garcia and CT done by [facility] (Creat 1.63 and Liver lesions smaller on CT)    No results found for: WBC, HGB, PLT, HGBEXT, PLTEXT, HGBEXT, PLTEXT  No results found for: NA, K, CL, CO2, BUN, CREA, CA, MG, PHOS   No results found for: SGOT, GPT, AP, TBIL, TP, ALB, GLOB, GGT  No results found for: INR, PTMR, PTP, PT1, PT2, APTT   No results found for: IRON, FE, TIBC, IBCT, PSAT, FERR        CONTROLLED SUBSTANCES SAFETY ASSESSMENT (IF ON CONTROLLED SUBSTANCES):   Not currently prescribing patient's opioid medications    Reviewed opioid safety handout:  [] Yes   [] No  24 hour opioid dose >150mg morphine equivalent/day:  [x] Yes   [] No  Benzodiazepines:  [] Yes   [x] No  Sleep apnea:  [] Yes   [x] No  Urine Toxicology Testing within last 6 months:  [] Yes   [x] No  History of or new aberrant medication taking behaviors:  [] Yes   [x] No          Total time: 50min  Counseling / coordination time: 40min  > 50% counseling / coordination?: yes re disease process, support

## 2017-12-13 NOTE — PROGRESS NOTES
Palliative Medicine Office Visit  Palliative Medicine Nurse Check In  ([phone])    Patient Name: Elgin Rodriguez  YOB: 1960      Date of Office Visit: 12/13/2017      Patient states: \" I am here for a follow up appointment I have pelvic pressure\". From Check In Sheet (scanned in Media):  Has a medical provider talked with you about cardiopulmonary resuscitation (CPR)? [x] Yes   [] No   [] Unable to obtain    Nurse reminder to complete or update ACP FlowSheet:    Is ACP on the Problem List?    [] Yes    [x] No  IF ACP Document is ON FILE; Nurse to place ACP on Problem List     Is there an ACP Note in Chart Review/Note? [] Yes    [x] No   If NO: [address] 11/8/2017   Patient's Healthcare Decision Maker is: Verbal statement (Legal Next of Kin remains as decision maker)   Primary Decision Maker Name Marina Curran   Primary Decision Maker Phone Number [phone]   Primary Decision Maker Relationship to Patient Spouse       Is there anything that we should know about you as a person in order to provide you the best care possible? No    Have you been to the ER, urgent care clinic since your last visit? [] Yes   [x] No   [] Unable to obtain    Have you been hospitalized since your last visit? [] Yes   [x] No   [] Unable to obtain    Have you seen or consulted any other health care providers outside of the [address] [place] [address] since your last visit?    [] Yes   [x] No   [] Unable to obtain    Functional status (describe):         Last BM:  Patient has a Ileostomy     accessed (date): 12/12/7    Bottle review (for opioid pain medication):  Medication 1:  Patient did not bring any medication with  Date filled:   Directions:   # filled:   # left:   # pills taking per day:  Last dose taken:    Medication 2:   Date filled:   Directions:   # filled:   # left:   # pills taking per day:  Last dose taken:    Medication 3:   Date filled:   Directions:   # filled:   # left:   # pills taking per day:  Last dose taken:    Medication 4:   Date filled:   Directions:   # filled:   # left:   # pills taking per day:  Last dose taken:

## 2017-12-13 NOTE — MR AVS SNAPSHOT
Visit Information Date & Time Provider Department Dept. Phone Encounter #  
 12/13/2017 12:00 PM Jenniffer Jimenez MD [place] [id number] Upcoming Health Maintenance Date Due Hepatitis C Screening 1960 DTaP/Tdap/Td series (1 - Tdap) 5/10/1980 FOBT Q 1 YEAR AGE 50-75 5/10/2009 Pneumococcal 19-64 Highest Risk (1 of 3 - PCV13) 11/14/2018* Influenza Age 5 to Adult 11/14/2018* *Topic was postponed. The date shown is not the original due date. Allergies as of 12/13/2017  Review Complete On: 12/13/2017 By: Gabriel Ramirez LPN Severity Noted Reaction Type Reactions Levaquin [Levofloxacin]  04/08/2011    Nausea and Vomiting Current Immunizations  Never Reviewed No immunizations on file. Not reviewed this visit You Were Diagnosed With   
  
 Codes Comments Constipation, unspecified constipation type    -  Primary ICD-10-CM: K59.00 ICD-9-CM: 564.00 Edema, unspecified type     ICD-10-CM: R60.9 ICD-9-CM: 782.3 RPF (retroperitoneal fibrosis)     ICD-10-CM: N13.5 ICD-9-CM: 593.4 Other chronic pain     ICD-10-CM: G89.29 ICD-9-CM: 338.29 Vitals BP Pulse Temp Resp Height(growth percentile) Weight(growth percentile) 108/63 (BP 1 Location: Right arm, BP Patient Position: Sitting) 92 96.9 °F (36.1 °C) (Oral) 16 6' 2\" (1.88 m) 182 lb 9.6 oz (82.8 kg) SpO2 BMI Smoking Status 95% 23.44 kg/m2 Never Smoker Vitals History BMI and BSA Data Body Mass Index Body Surface Area  
 23.44 kg/m 2 2.08 m 2 Your Updated Medication List  
  
   
This list is accurate as of: 12/13/17  1:51 PM.  Always use your most recent med list.  
  
  
  
  
 ELIQUIS 5 mg tablet Generic drug:  apixaban Take 1 Tab by mouth two (2) times a day. furosemide 40 mg tablet Commonly known as:  LASIX Take 1 Tab by mouth daily. * oxyCODONE ER 80 mg ER tablet Commonly known as:  OxyCONTIN Take 1 Tab by mouth every eight (8) hours. * oxyCODONE ER 20 mg ER tablet Commonly known as:  OxyCONTIN Take 1 Tab by mouth every eight (8) hours. * oxyCODONE IR 30 mg immediate release tablet Commonly known as:  Cheryln Records Take 1 Tab by mouth every four (4) hours as needed. senna-docusate 8.6-50 mg per tablet Commonly known as:  Lisbeth Pilon Take 2 Tabs by mouth two (2) times a day for 120 days. * Notice: This list has 3 medication(s) that are the same as other medications prescribed for you. Read the directions carefully, and ask your doctor or other care provider to review them with you. Prescriptions Printed Refills  
 senna-docusate (PERICOLACE) 8.6-50 mg per tablet 5 Sig: Take 2 Tabs by mouth two (2) times a day for 120 days. Class: Print Route: Oral  
  
To-Do List   
 12/13/2017 Imaging:  XR ABD (KUB)   
  
 12/20/2017 10:00 AM  
  Appointment with Riki Fiore at [address] ([phone])  
  
 01/10/2018 2:00 PM  
  Appointment with Riki Fiore at [address] ([phone])  
  
 01/15/2018 2:00 PM  
  Appointment with Riki Fiore at [address] ([phone]) Patient Instructions Dear Racheal Solo , It was a pleasure seeing you in the Palliative Medicine Office today. This is what we talked about: 1. Disease 
-You saw Dr. Vidya Engel at [facility] had good news of with the results of the CT scan with a decrease in size of the liver lesions and lower tumor markers 
-Your kidney function is up a little bit but Dr. Magui Wilson 2.  Pelvic pressure 
-This is the greatest issue for you 
-There is a wide fluctuation of this causing an impact on quality of life 
-Rectal pressure is part of the symptoms as well as with some discharge 
-There seems to be a motility delay with your intestines; where stool may be building up and then a large amount may be releasing 
-You are still having rectal discharge despite not having much stool through this area 
-You have the most pressure with sitting and standing; but this has been better than it has but still is a huge issue for quality of life  
-We talked about restarting Senna-S 2 tabs twice a day as a trial 
-We will do a basic xray just to see if there is a lot of stool in the bowel 
-You are very interested in acupuncture 
-You were switched to [address] but didn't seem to do well with this and went back to [facility] 3. Nephrostomy tubes 
-These are scheduled to be changed by Dr. Neli Ames on 12/18/17 
-You had some pain with the flushing of the tubes but this resolved itself 4. Swelling 
-This has been better 
-The swelling has decreased with the chemotherapy 
-It maybe possible that the chemotherapy if affecting the lymphedema 
-You are not taking the Lasix at this time 
-You are having special compression stockings made and a device to help with this 5. Advance Care Planning 
-We will talk more about this in an upcoming visit This is what you have shared with us about Advance Care Planning Advance Care Planning 11/8/2017 Patient's Healthcare Decision Maker is: Verbal statement (Legal Next of Kin remains as decision maker) Primary Decision Maker Name Fede Gautam Primary Decision Maker Phone Number [phone] Primary Decision Maker Relationship to Patient Spouse The Palliative Medicine Team is here to support you and your family. We will see you again in about a month If there are any concerns before that time, such as medication questions, worsening symptoms or a need to see a physician for an urgent or emergent situation; please call [phone] (COPE).  A physician is also on call after our normal business hours of 8am to 5pm.  
 
In order to serve you better, please allow up to 48 hours for prescription refills to be processed. Certain medications may require more paperwork or a written prescription that you may need to  from the office. We appreciate you letting us know of any refill requests as soon as possible. We also would like you to sign up for [employer] as well. Sincerely, Nas Moss MD and the Palliative Medicine Team 
 
 
 
 
  
Introducing [facility]! Rolandofracisco Peña introduces [employer] patient portal. Now you can access parts of your medical record, email your doctor's office, and request medication refills online. 1. In your internet browser, go to [email or web address] 2. Click on the First Time User? Click Here link in the Sign In box. You will see the New Member Sign Up page. 3. Enter your [employer] Access Code exactly as it appears below. You will not need to use this code after youve completed the sign-up process. If you do not sign up before the expiration date, you must request a new code. · [employer] Access Code: [email or web address] Expires: 2/6/2018 12:59 PM 
 
4. Enter the last four digits of your Social Security Number (xxxx) and Date of Birth (mm/dd/yyyy) as indicated and click Submit. You will be taken to the next sign-up page. 5. Create a [employer] ID. This will be your [employer] login ID and cannot be changed, so think of one that is secure and easy to remember. 6. Create a [employer] password. You can change your password at any time. 7. Enter your Password Reset Question and Answer. This can be used at a later time if you forget your password. 8. Enter your e-mail address. You will receive e-mail notification when new information is available in [address] 9. Click Sign Up. You can now view and download portions of your medical record. 10. Click the Download Summary menu link to download a portable copy of your medical information.  
 
If you have questions, please visit the Frequently Asked Questions section of the [employer]. Remember, [employer] is NOT to be used for urgent needs. For medical emergencies, dial 911. Now available from your iPhone and Android! Please provide this summary of care documentation to your next provider. Your primary care clinician is listed as Elizabeth Alamo. If you have any questions after today's visit, please call [phone].

## 2017-12-19 NOTE — TELEPHONE ENCOUNTER
Returned call to the wife Yuki Mancuso regarding request for appointment this week for Acupuncture. She stated that her  has not been feeling good. She put him on speaker to talk to me. He stated that it started on Sunday morning when he woke up. He said he had several  loose stools in the Ostomy. He stated that the loose stools have gotten better since yesterday, and the stool is more formed. He said he didn't have a temp. He said he had weakness and vomited  X-1 episode on Sunday. He does not have any further N/V since. He said he had pain to his stomach yesterday and it's better today. He said he currently has pain to the rectal area only and it is aggravated when he is up and about. It gets better once he is in bed laying down. The pain level is 6/10. He stated he last took a Oxycodone ER 80mg tab today at 12PM. He was encouraged to take the Oxycodone IR 30mg tab for pain if he feels he needs to. He stated he is trying to hold off from taking it and stated again the pain gets better once he is laying down. Both him and his wife were told that the  did reach out to DR Ivette Lockett regarding his request for an appointment this week for Acupuncture. They were made aware DR Ivette Lockett does not have Clinic hours scheduled for this Friday as they normally come on Fridays. He was advised to please call the Palliative office if anything changes or he has any concerns. There was no further questions or concerns and they verbalized full understanding.

## 2017-12-20 NOTE — PROGRESS NOTES
[facility]  Physical Therapy Lymphedema Clinic  [address] [facility], [address], [facility] 22.    LYmphedema Therapy  Visit: 2    [x]                  Daily note               []                 30 day/10th visit progress note    NAME: Teagan Lara  DATE: 12/20/2017     Short term goals  Time frame: 1/2/17  1. Patient will demonstrate knowledge of signs/symptoms of infections/cellulitis and be independent in skin care to prevent cellulitis. Reviewed skin care with low pH lotion and encouraged patient to perform the skin care daily. 2.  Patient will demonstrate independence in lymphedema home program of therapeutic exercises to improve circulation and decongest limb to improve ADLs. Deferred for garment fitting and pneumatic device demonstration today. 3.  Patient will be fitted for custom flat-knit bilateral lower extremity compression garments to prevent worsening for swelling and to allow for home management of the condition. Fitted patient for new custom Jobst Elvarex compression stockings with good product fit noted. Educated them in garment donning and doffing and care. Goal met 12/20/17.     Long term goals  Time frame: 3/1/18  1. Pt will show improvement in Lymphedema Life Impact Scale by decreasing impairment percentage to under 32 and thus allow improvement in patient's quality of life. 2.  Patient will be independent with don/doff of compression system and use in order to prevent reaccumulation of fluid at discharge. Educated in donning and doffing today and will reassess stocking use next session. 3.  Pt will be independent in self-MLD and show stable limb volumes showing decongestion and pt. ready for transition to independent restorative phase of lymphedema therapy. Initiated education in manual lymph drainage techniques and provided them with a handout of techniques. Further review is needed.        SUBJECTIVE REPORT:My swelling has gotten a lot better and it really isn't bothering me now. I have been in bed for the past 3 days though and I am not sure if that is why the swelling is better. I was unable to have my nephrostomy tubes replaced because I have been sick. Pain:  Pain Scale 1: Numeric (0 - 10)     Pain Intensity 1: 0     Pain Location 1: Leg     Pain Orientation 1: Left, Right     Patient Stated Pain Goal: 0     Gait:  Ambulates independent with assistive device for community distances  ADLs:  Modified independent to independent  Treatment Response:  Patient reports his swelling has significantly decreased once again and has been good for several days now. He reports that he has been in bed the past few days secondary to probable chemotherapy side effects. He is feeling better today. He received his compression garments and brought them in for a fitting today. Function: Patient's wife attends appointments with him. He is independent with ambulation and transfers. TREATMENT AND OBJECTIVE DATA SUMMARY:   Patient/Family Education:      Educated in skin care: Reviewed skin care principles     Educated in exercise:  Deferred today for compression garment education and vasopneumatic device demosntrations     Instructed in self MLD:   Written sequence given and reviewed with patient as well as demonstration and instruction during MLD portion of the session. Educated in advanced vasopneumatic device use. Instructed in don/doff of compression system:    Fitted patient for bilateral lower extremity custom Jobst elvarex closed toe compression knee highs and left thigh sleeve. Good fit of knee highs is noted with no problems observed today on fitting. Educated patient and his wife Dayton Phelps in garment donning and doffing, laundering, daytime wearing schedule, 6 month lifespan, use of gloves with donning, garment care and avoiding lotion right before donning.   Patient's wife redemonstrated partial donning of left leg knee high with no problems and repeat verbalized all other donning instructions and garment care and wearing schedule instructions. Educated them in monitoring his skin for pressure points. Patient is advised to wear the knee highs during the daytime and the left thigh piece daily if swelling is noted, but if he seems to not be swelling, he may want to wear the thigh piece with with increased activity. They verbalized understanding and patient left with knee highs in place. Time was slightly limited today to practice all aspects of product use secondary to vasopneumatic pump trial and patient having another appointment. Therapeutic Activity 0 minutes   Treatment time: N/A  Functional Mobility: Independent ambulation and transfers as well as bed mobility   Fall risk: Mild     Therapeutic Exercise/Procedure 0 minutes   Treatment time:  N/A  Sharon ball exercise program: N/A. Stick exercise program: N/A   Free exercises/ROM: N/A   Home program: Patient to perform daily to BID skin care, deep abdominal breathing, exercise routine, and daytime compression garment wearing schedule. Rationale: Exercise will increase the lymph angiomotoricity and tissue pressure of the skin and thus decrease swelling. Modalities 55 minutes   Treatment time: 5099-9664  Vasopneumatic pump: Completed basic and advanced pneumatic compression device demonstrations today. Patient had some discomfort with the basic device and the demonstration was discontinued. Completed the advanced pneumatic device demonstration on the left leg with Tactile  Michael Richard. Opted to perform just the leg portion of the Flexitouch treatment today and will demo the waist portion with the leg next session secondary to the presence of an ostomy and nephrostomy tubes and wanting to progress slowly with monitoring patient response.   Educated patient and his wife in daily product use with performing on one leg each day and alternating legs from day to day. Educated in donning process, ordering process, training to be performed upon delivery, and treatment length. [facility] and would benefit the device in his home. While patient was using the Flexitouch on the left leg therapist educated him and his wife in Manual lymph drainage techniques for the trunk as well as deep abdominal breathing. See below for these details. Girths were assessed pre and post Flexitouch use as follows:     Right pre-flexitouch  (cm) Right post Flexitouch (cm)   Ankle     25.0     24.4   Calf     36.0     35.3   Mid Knee     39.0     38.5   Thigh     43.2     43.2        Manual Lymphatic Drainage (MLD) 75 minutes   Treatment time: 0831-9505, 1125- 1215  Area to decongest: Bilateral lower extremities   Sequence used and effectiveness: Bilateral lower extremity secondary sequences with deep abdominal breathing, cervical and supraclavicular nodes, bilateral axillary and inguinal nodes, bilateral inguinal axillary anastamoses. These techniques were performed during the advanced pneumatic device trial and patient and his wife were educated in step by step performance of techniques with reasoning for performing manual drainage. Patient redemonstrated some of the trunk techniques on himself, but he has some finger contractures that limit his ability to achieve a flat hand position for stationary circles. Provided with with a handout of self manual lymph drainage techniques and further review is recommended. Skin/wound care/debridement: Skin is 100% intact but dry bilaterally with evidence that patient has been scratching. Discussed mixing aquaphor in with the lotion to help with the itching . Upper/Lower extremity compression: Fitted patient for bilateral lower extremity custom Jobst elvarex closed toe compression knee highs and left thigh sleeve. Good fit of knee highs is noted with no problems observed today on fitting.   Educated patient and his wife Divya Gentry in garment donning and doffing, laundering, daytime wearing schedule, 6 month lifespan, use of gloves with donning, garment care and avoiding lotion right before donning. Patient's wife redemonstrated partial donning of left leg knee high with no problems and repeat verbalized all other donning instructions and garment care and wearing schedule instructions. Educated them in monitoring for pressure points. Patient is advised to wear the knee highs during the daytime and the left thigh piece daily if swelling is noted, but if he seems to not be swelling, he may want to wear it with increased activity. They verbalized understanding and patient left with knee highs in place. Time was slightly limited today to practice all aspects of product use secondary to vasopneumatic pump trial and patient having another appointment. Kinesiotaping: N/A   Girth/Volume measurement: Reassessed Girths today. Please refer to modalities section. TOTAL TREATMENT 100 mins     Circumferential Limb Measurements:  Bilateral lower extremities. Reassessed girths today. Please refer to modalities section of note. ASSESSMENT:   Treatment effectiveness and tolerance: Patient received his custom compression stockings with good garment fit noted bilaterally. Educated patient and his wife in garment wearing schedule and garment use. His swelling is decreased versus his last treatment session. Completed basic and advanced vaspneumatic device demonstration today with decreases in girths noted after the advanced device demonstration today. Patient would benefit from the [address] in his home for long term management of his swelling. Will continue with education in self management techniques next treatment session and assess compression garment use and effectiveness. Progress toward goals: See goals above. Short term goal 3 is met.      PLAN OF CARE:   Changes to the plan of care: Continue with plan of care as established on evaluation. Frequency: 1 visit every 1-3 weeks   Other: Continue with education in self management techniques.         JACKELYN GuptaT, CLYADIEL-GUSTAVO

## 2017-12-22 NOTE — TELEPHONE ENCOUNTER
Iesha Lazar is calling to speak to clinical team.  States that she would like results of XR done earlier and also to discuss the amount of pressure patient is having. States patient has gotten very uncomfortable with pressure/pain in pelvic region.

## 2017-12-22 NOTE — TELEPHONE ENCOUNTER
Returned call to [email or web address]. I let her know I spoke to DR Sepideh Joseph regarding the X-ray report and it was [id number] with minimal retained stool noted. She stated that the pressure is a little better today to the pelvic area. It was the first day he was able to get out of the bed. He went to an appointment at [facility] for labs. She seems to think it was a bug that he had. The N/V and loose stools all have stopped. She was told DR Sepideh Joseph will be contacting her as well some time today. She was very appreciative.

## 2017-12-23 NOTE — PROGRESS NOTES
Palliative Medicine    Call placed to patient     Wed had massage with lymphedema clinic  After this; had more pressure in rectum and pelvis; more intense x 2 days  Started calming down yesterday and today has been a lot better  He is still able to function but its a really uncomfortable feeling; like he has to have a bowel movement    Sitting/lying is normally comfortable  But when the pain is worse even lying down was uncomfortable  They do want to do the massage again  He just took the regular pain medication when the pain increased; he would take an additional oxycodone IR but it didn't really help    He started the senna last week; woke up and ostomy bag was full  He started getting nauseated and then had vomiting  For a few days; he was having more output  The chemo that he is on; is supposed to have diarrhea (instructions say that on Day 11 there is an increase in the diarrhea but this was Day 9)  It did seem to calm the pressure down some though, with more stool output  A few days it wasn't as intense in the pelvic pressure    He was supposed to have his nephrostomy tubes replaced; but he was having a lot of output; so he cancelled this Monday and Tuesday. Now they will be replaced in January    He was out today and got labs done  Did have a fever 101; took Tylenol  It went back up; he was up and around - 100.6  Last chemotherapy was 15 days ago  Tubes / urine are high risk of infection     Xray: 12/20/17  IMPRESSION: Non-obstructive bowel gas pattern. Ureteral stent and nephrostomy tubes. No radiographic evidence for significant retained fecal material.    Action  1. No intervention at this time  2. Be very aware of temperature and let [facility] know, especially prior to leaving town - Urine with nephrostomy tubes is highest risk of infection  4. They have records for when they leave town; name /dates of chemo and labs  5.   Call if any issues with pain or other symptoms

## 2017-12-31 NOTE — PROGRESS NOTES
Telephone call  Talked with patient's wife via phone. Reviewed chart and patient with metastatic esophageal cancer as well as fibrosis condition(see full notes for details). Patient with b/l nephrostomy tubes and ostomy. He describes abdominal cramping since Friday. He has this already but seems worse with eating/drinking. No fever, no increased pain, no vomiting, no abdominal distension. She states nephrotomy output is a little darker but still present. She does have appointment with [facility] this week but wanted to be sure he could wait. Reviewed symptoms and nothing sounds like he needs to be seen urgently but if he develops fever, increased pain and vomiting, would encourage to go to ED. Also have provided her the number of [facility] which could come and evaluate tomorrow if needed and keep him out of the ED.

## 2018-01-01 ENCOUNTER — NURSE NAVIGATOR (OUTPATIENT)
Dept: PALLATIVE CARE | Age: 58
End: 2018-01-01

## 2018-01-01 ENCOUNTER — TELEPHONE (OUTPATIENT)
Dept: PALLATIVE CARE | Age: 58
End: 2018-01-01

## 2018-01-01 ENCOUNTER — DOCUMENTATION ONLY (OUTPATIENT)
Dept: PALLATIVE CARE | Age: 58
End: 2018-01-01

## 2018-01-01 ENCOUNTER — HOSPITAL ENCOUNTER (OUTPATIENT)
Dept: PHYSICAL THERAPY | Age: 58
Discharge: HOME | End: 2018-01-25

## 2018-01-01 ENCOUNTER — APPOINTMENT (OUTPATIENT)
Dept: PHYSICAL THERAPY | Age: 58
End: 2018-01-01

## 2018-01-01 ENCOUNTER — HOME CARE VISIT (OUTPATIENT)
Dept: HOSPICE | Facility: HOSPICE | Age: 58
End: 2018-01-01
Payer: COMMERCIAL

## 2018-01-01 ENCOUNTER — HOME VISIT (OUTPATIENT)
Dept: PALLATIVE CARE | Age: 58
End: 2018-01-01

## 2018-01-01 ENCOUNTER — HOSPITAL ENCOUNTER (INPATIENT)
Age: 58
LOS: 11 days | End: 2018-06-05
Attending: INTERNAL MEDICINE | Admitting: INTERNAL MEDICINE

## 2018-01-01 ENCOUNTER — HOSPICE ADMISSION (OUTPATIENT)
Dept: HOSPICE | Facility: HOSPICE | Age: 58
End: 2018-01-01
Payer: COMMERCIAL

## 2018-01-01 ENCOUNTER — HOSPICE CERTIFICATION ENCOUNTER (OUTPATIENT)
Dept: PALLATIVE CARE | Age: 58
End: 2018-01-01

## 2018-01-01 ENCOUNTER — PATIENT MESSAGE (OUTPATIENT)
Dept: PALLATIVE CARE | Age: 58
End: 2018-01-01

## 2018-01-01 VITALS — HEART RATE: 64 BPM | DIASTOLIC BLOOD PRESSURE: 76 MMHG | SYSTOLIC BLOOD PRESSURE: 110 MMHG | OXYGEN SATURATION: 98 %

## 2018-01-01 VITALS
TEMPERATURE: 97.5 F | SYSTOLIC BLOOD PRESSURE: 150 MMHG | DIASTOLIC BLOOD PRESSURE: 76 MMHG | RESPIRATION RATE: 16 BRPM | HEART RATE: 82 BPM

## 2018-01-01 VITALS
DIASTOLIC BLOOD PRESSURE: 50 MMHG | HEART RATE: 104 BPM | TEMPERATURE: 97.8 F | OXYGEN SATURATION: 79 % | SYSTOLIC BLOOD PRESSURE: 80 MMHG | RESPIRATION RATE: 18 BRPM

## 2018-01-01 VITALS — OXYGEN SATURATION: 95 % | HEART RATE: 65 BPM | DIASTOLIC BLOOD PRESSURE: 64 MMHG | SYSTOLIC BLOOD PRESSURE: 128 MMHG

## 2018-01-01 VITALS — DIASTOLIC BLOOD PRESSURE: 80 MMHG | OXYGEN SATURATION: 98 % | SYSTOLIC BLOOD PRESSURE: 124 MMHG | HEART RATE: 56 BPM

## 2018-01-01 VITALS — HEART RATE: 56 BPM | DIASTOLIC BLOOD PRESSURE: 80 MMHG | OXYGEN SATURATION: 98 % | SYSTOLIC BLOOD PRESSURE: 124 MMHG

## 2018-01-01 VITALS
DIASTOLIC BLOOD PRESSURE: 81 MMHG | HEART RATE: 62 BPM | OXYGEN SATURATION: 96 % | TEMPERATURE: 98.7 F | SYSTOLIC BLOOD PRESSURE: 149 MMHG

## 2018-01-01 DIAGNOSIS — R11.2 NAUSEA AND VOMITING, INTRACTABILITY OF VOMITING NOT SPECIFIED, UNSPECIFIED VOMITING TYPE: ICD-10-CM

## 2018-01-01 DIAGNOSIS — M54.9 BACK PAIN, UNSPECIFIED BACK LOCATION, UNSPECIFIED BACK PAIN LATERALITY, UNSPECIFIED CHRONICITY: ICD-10-CM

## 2018-01-01 DIAGNOSIS — G89.3 CANCER ASSOCIATED PAIN: ICD-10-CM

## 2018-01-01 DIAGNOSIS — R10.2 PELVIC PAIN: Primary | ICD-10-CM

## 2018-01-01 DIAGNOSIS — N50.82 SCROTAL PAIN: ICD-10-CM

## 2018-01-01 DIAGNOSIS — C15.5 MALIGNANT NEOPLASM OF LOWER THIRD OF ESOPHAGUS (HCC): ICD-10-CM

## 2018-01-01 DIAGNOSIS — C15.5 MALIGNANT NEOPLASM OF LOWER THIRD OF ESOPHAGUS (HCC): Primary | ICD-10-CM

## 2018-01-01 DIAGNOSIS — C78.7 LIVER METASTASIS (HCC): Primary | ICD-10-CM

## 2018-01-01 DIAGNOSIS — G89.29 CHRONIC RECTAL PAIN: ICD-10-CM

## 2018-01-01 DIAGNOSIS — R53.1 WEAKNESS: ICD-10-CM

## 2018-01-01 DIAGNOSIS — R53.83 FATIGUE, UNSPECIFIED TYPE: ICD-10-CM

## 2018-01-01 DIAGNOSIS — R53.81 DEBILITY: ICD-10-CM

## 2018-01-01 DIAGNOSIS — N13.5 RPF (RETROPERITONEAL FIBROSIS): ICD-10-CM

## 2018-01-01 DIAGNOSIS — C15.9 MALIGNANT NEOPLASM OF ESOPHAGUS, UNSPECIFIED LOCATION (HCC): ICD-10-CM

## 2018-01-01 DIAGNOSIS — C78.7 LIVER METASTASIS (HCC): ICD-10-CM

## 2018-01-01 DIAGNOSIS — G89.3 CANCER ASSOCIATED PAIN: Primary | ICD-10-CM

## 2018-01-01 DIAGNOSIS — Z51.5 COMFORT MEASURES ONLY STATUS: ICD-10-CM

## 2018-01-01 DIAGNOSIS — I89.0 LYMPHEDEMA: ICD-10-CM

## 2018-01-01 DIAGNOSIS — N50.82 SCROTAL PAIN: Primary | ICD-10-CM

## 2018-01-01 DIAGNOSIS — K62.89 CHRONIC RECTAL PAIN: ICD-10-CM

## 2018-01-01 DIAGNOSIS — R10.84 ABDOMINAL PAIN, GENERALIZED: Primary | ICD-10-CM

## 2018-01-01 DIAGNOSIS — C15.9 MALIGNANT NEOPLASM OF ESOPHAGUS, UNSPECIFIED LOCATION (HCC): Primary | ICD-10-CM

## 2018-01-01 DIAGNOSIS — I82.4Y3 ACUTE DEEP VEIN THROMBOSIS (DVT) OF PROXIMAL VEIN OF BOTH LOWER EXTREMITIES (HCC): ICD-10-CM

## 2018-01-01 DIAGNOSIS — N13.5 RPF (RETROPERITONEAL FIBROSIS): Primary | ICD-10-CM

## 2018-01-01 DIAGNOSIS — R10.2 PELVIC PAIN: ICD-10-CM

## 2018-01-01 DIAGNOSIS — F41.8 ANXIETY ABOUT HEALTH: ICD-10-CM

## 2018-01-01 DIAGNOSIS — C15.9 ESOPHAGEAL CANCER, STAGE IV (HCC): ICD-10-CM

## 2018-01-01 DIAGNOSIS — R10.9 INTRACTABLE ABDOMINAL PAIN: ICD-10-CM

## 2018-01-01 DIAGNOSIS — R63.0 ANOREXIA: ICD-10-CM

## 2018-01-01 DIAGNOSIS — R19.7 DIARRHEA, UNSPECIFIED TYPE: ICD-10-CM

## 2018-01-01 DIAGNOSIS — R60.0 LOWER EXTREMITY EDEMA: ICD-10-CM

## 2018-01-01 DIAGNOSIS — R10.84 ABDOMINAL PAIN, GENERALIZED: ICD-10-CM

## 2018-01-01 DIAGNOSIS — Z71.89 ACP (ADVANCE CARE PLANNING): ICD-10-CM

## 2018-01-01 DIAGNOSIS — R11.2 NAUSEA AND VOMITING, INTRACTABILITY OF VOMITING NOT SPECIFIED, UNSPECIFIED VOMITING TYPE: Primary | ICD-10-CM

## 2018-01-01 DIAGNOSIS — I82.4Y3 ACUTE DEEP VEIN THROMBOSIS (DVT) OF PROXIMAL VEIN OF BOTH LOWER EXTREMITIES (HCC): Primary | ICD-10-CM

## 2018-01-01 DIAGNOSIS — R10.9 FLANK PAIN: Primary | ICD-10-CM

## 2018-01-01 DIAGNOSIS — G89.29 OTHER CHRONIC PAIN: ICD-10-CM

## 2018-01-01 LAB
25(OH)D3+25(OH)D2 SERPL-MCNC: 26.7 NG/ML (ref 30–100)
ALBUMIN SERPL-MCNC: 3.2 G/DL (ref 3.5–5.5)
ALBUMIN SERPL-MCNC: 3.6 G/DL (ref 3.5–5.5)
ALBUMIN/GLOB SERPL: 1.1 {RATIO} (ref 1.2–2.2)
ALP SERPL-CCNC: 67 IU/L (ref 39–117)
ALP SERPL-CCNC: 89 IU/L (ref 39–117)
ALT SERPL-CCNC: 5 IU/L (ref 0–44)
ALT SERPL-CCNC: 7 IU/L (ref 0–44)
AST SERPL-CCNC: 13 IU/L (ref 0–40)
AST SERPL-CCNC: 17 IU/L (ref 0–40)
BACTERIA UR CULT: ABNORMAL
BACTERIA UR CULT: ABNORMAL
BASOPHILS # BLD AUTO: 0 X10E3/UL (ref 0–0.2)
BASOPHILS # BLD AUTO: 0 X10E3/UL (ref 0–0.2)
BASOPHILS NFR BLD AUTO: 0 %
BASOPHILS NFR BLD AUTO: 0 %
BILIRUB DIRECT SERPL-MCNC: ABNORMAL MG/DL (ref 0–0.4)
BILIRUB SERPL-MCNC: <0.2 MG/DL (ref 0–1.2)
BILIRUB SERPL-MCNC: ABNORMAL MG/DL (ref 0–1.2)
BUN SERPL-MCNC: 11 MG/DL (ref 6–24)
BUN SERPL-MCNC: 17 MG/DL (ref 6–24)
BUN SERPL-MCNC: 23 MG/DL (ref 6–24)
BUN/CREAT SERPL: 13 (ref 9–20)
BUN/CREAT SERPL: 6 (ref 9–20)
BUN/CREAT SERPL: 7 (ref 9–20)
CALCIUM SERPL-MCNC: 8.7 MG/DL (ref 8.7–10.2)
CALCIUM SERPL-MCNC: 9 MG/DL (ref 8.7–10.2)
CALCIUM SERPL-MCNC: 9.1 MG/DL (ref 8.7–10.2)
CHLORIDE SERPL-SCNC: 91 MMOL/L (ref 96–106)
CHLORIDE SERPL-SCNC: 94 MMOL/L (ref 96–106)
CHLORIDE SERPL-SCNC: 97 MMOL/L (ref 96–106)
CO2 SERPL-SCNC: 23 MMOL/L (ref 18–29)
CO2 SERPL-SCNC: 25 MMOL/L (ref 18–29)
CO2 SERPL-SCNC: 31 MMOL/L (ref 18–29)
CREAT SERPL-MCNC: 1.75 MG/DL (ref 0.76–1.27)
CREAT SERPL-MCNC: 1.89 MG/DL (ref 0.76–1.27)
CREAT SERPL-MCNC: 2.42 MG/DL (ref 0.76–1.27)
EOSINOPHIL # BLD AUTO: 0.1 X10E3/UL (ref 0–0.4)
EOSINOPHIL # BLD AUTO: 0.1 X10E3/UL (ref 0–0.4)
EOSINOPHIL NFR BLD AUTO: 2 %
EOSINOPHIL NFR BLD AUTO: 2 %
ERYTHROCYTE [DISTWIDTH] IN BLOOD BY AUTOMATED COUNT: 15.2 % (ref 12.3–15.4)
ERYTHROCYTE [DISTWIDTH] IN BLOOD BY AUTOMATED COUNT: 17 % (ref 12.3–15.4)
ERYTHROCYTE [DISTWIDTH] IN BLOOD BY AUTOMATED COUNT: 17.2 % (ref 12.3–15.4)
FOLATE BLD-MCNC: NORMAL NG/ML
FOLATE RBC-MCNC: NORMAL NG/ML
GFR SERPLBLD CREATININE-BSD FMLA CKD-EPI: 28 ML/MIN/1.73
GFR SERPLBLD CREATININE-BSD FMLA CKD-EPI: 33 ML/MIN/1.73
GFR SERPLBLD CREATININE-BSD FMLA CKD-EPI: 38 ML/MIN/1.73
GFR SERPLBLD CREATININE-BSD FMLA CKD-EPI: 42 ML/MIN/1.73
GFR SERPLBLD CREATININE-BSD FMLA CKD-EPI: 44 ML/MIN/1.73
GFR SERPLBLD CREATININE-BSD FMLA CKD-EPI: 48 ML/MIN/1.73
GLOBULIN SER CALC-MCNC: 3.2 G/DL (ref 1.5–4.5)
GLUCOSE SERPL-MCNC: 109 MG/DL (ref 65–99)
GLUCOSE SERPL-MCNC: 111 MG/DL (ref 65–99)
GLUCOSE SERPL-MCNC: 97 MG/DL (ref 65–99)
HCT VFR BLD AUTO: 28.7 % (ref 37.5–51)
HCT VFR BLD AUTO: 29.1 % (ref 37.5–51)
HCT VFR BLD AUTO: 31.1 % (ref 37.5–51)
HGB BLD-MCNC: 10.6 G/DL (ref 13–17.7)
HGB BLD-MCNC: 9.4 G/DL (ref 13–17.7)
HGB BLD-MCNC: 9.8 G/DL (ref 13–17.7)
IMM GRANULOCYTES # BLD: 0 X10E3/UL (ref 0–0.1)
IMM GRANULOCYTES NFR BLD: 0 %
LYMPHOCYTES # BLD AUTO: 0.6 X10E3/UL (ref 0.7–3.1)
LYMPHOCYTES # BLD AUTO: 0.9 X10E3/UL (ref 0.7–3.1)
LYMPHOCYTES NFR BLD AUTO: 15 %
LYMPHOCYTES NFR BLD AUTO: 18 %
MAGNESIUM SERPL-MCNC: 1.9 MG/DL (ref 1.6–2.3)
MCH RBC QN AUTO: 28.2 PG (ref 26.6–33)
MCH RBC QN AUTO: 28.2 PG (ref 26.6–33)
MCH RBC QN AUTO: 28.4 PG (ref 26.6–33)
MCHC RBC AUTO-ENTMCNC: 32.3 G/DL (ref 31.5–35.7)
MCHC RBC AUTO-ENTMCNC: 34.1 G/DL (ref 31.5–35.7)
MCHC RBC AUTO-ENTMCNC: 34.1 G/DL (ref 31.5–35.7)
MCV RBC AUTO: 83 FL (ref 79–97)
MCV RBC AUTO: 83 FL (ref 79–97)
MCV RBC AUTO: 87 FL (ref 79–97)
MONOCYTES # BLD AUTO: 0.2 X10E3/UL (ref 0.1–0.9)
MONOCYTES # BLD AUTO: 0.4 X10E3/UL (ref 0.1–0.9)
MONOCYTES NFR BLD AUTO: 4 %
MONOCYTES NFR BLD AUTO: 7 %
NEUTROPHILS # BLD AUTO: 3.3 X10E3/UL (ref 1.4–7)
NEUTROPHILS # BLD AUTO: 3.7 X10E3/UL (ref 1.4–7)
NEUTROPHILS NFR BLD AUTO: 73 %
NEUTROPHILS NFR BLD AUTO: 79 %
PHOSPHATE SERPL-MCNC: 3.2 MG/DL (ref 2.5–4.5)
PLATELET # BLD AUTO: 207 X10E3/UL (ref 150–379)
PLATELET # BLD AUTO: 230 X10E3/UL (ref 150–379)
PLATELET # BLD AUTO: 233 X10E3/UL (ref 150–379)
POTASSIUM SERPL-SCNC: 4.1 MMOL/L (ref 3.5–5.2)
POTASSIUM SERPL-SCNC: 4.4 MMOL/L (ref 3.5–5.2)
POTASSIUM SERPL-SCNC: 5 MMOL/L (ref 3.5–5.2)
PROT SERPL-MCNC: 5.9 G/DL (ref 6–8.5)
PROT SERPL-MCNC: 6.8 G/DL (ref 6–8.5)
RBC # BLD AUTO: 3.33 X10E6/UL (ref 4.14–5.8)
RBC # BLD AUTO: 3.47 X10E6/UL (ref 4.14–5.8)
RBC # BLD AUTO: 3.73 X10E6/UL (ref 4.14–5.8)
SODIUM SERPL-SCNC: 135 MMOL/L (ref 134–144)
SODIUM SERPL-SCNC: 137 MMOL/L (ref 134–144)
SODIUM SERPL-SCNC: 140 MMOL/L (ref 134–144)
SPECIMEN STATUS REPORT, ROLRST: NORMAL
SPECIMEN STATUS REPORT, ROLRST: NORMAL
VIT B12 SERPL-MCNC: >2000 PG/ML (ref 232–1245)
WBC # BLD AUTO: 4.2 X10E3/UL (ref 3.4–10.8)
WBC # BLD AUTO: 5.1 X10E3/UL (ref 3.4–10.8)
WBC # BLD AUTO: 8.1 X10E3/UL (ref 3.4–10.8)

## 2018-01-01 PROCEDURE — 74011250637 HC RX REV CODE- 250/637: Performed by: NURSE PRACTITIONER

## 2018-01-01 PROCEDURE — 74011250636 HC RX REV CODE- 250/636: Performed by: INTERNAL MEDICINE

## 2018-01-01 PROCEDURE — 0656 HSPC GENERAL INPATIENT

## 2018-01-01 PROCEDURE — 74011250636 HC RX REV CODE- 250/636: Performed by: NURSE PRACTITIONER

## 2018-01-01 PROCEDURE — HOSPICE MEDICATION HC HH HOSPICE MEDICATION

## 2018-01-01 PROCEDURE — 74011250637 HC RX REV CODE- 250/637: Performed by: INTERNAL MEDICINE

## 2018-01-01 PROCEDURE — 74011250637 HC RX REV CODE- 250/637: Performed by: PHYSICAL MEDICINE & REHABILITATION

## 2018-01-01 PROCEDURE — 74011250636 HC RX REV CODE- 250/636

## 2018-01-01 PROCEDURE — 74011250636 HC RX REV CODE- 250/636: Performed by: PHYSICAL MEDICINE & REHABILITATION

## 2018-01-01 PROCEDURE — 74011000250 HC RX REV CODE- 250: Performed by: NURSE PRACTITIONER

## 2018-01-01 PROCEDURE — 3336500001 HSPC ELECTION

## 2018-01-01 RX ORDER — MIDAZOLAM HYDROCHLORIDE 5 MG/ML
INJECTION INTRAMUSCULAR; INTRAVENOUS
Status: COMPLETED
Start: 2018-01-01 | End: 2018-01-01

## 2018-01-01 RX ORDER — PHENOBARBITAL SODIUM 65 MG/ML
65 INJECTION INTRAMUSCULAR ONCE
Status: COMPLETED | OUTPATIENT
Start: 2018-01-01 | End: 2018-01-01

## 2018-01-01 RX ORDER — DIPHENOXYLATE HYDROCHLORIDE AND ATROPINE SULFATE 2.5; .025 MG/1; MG/1
1 TABLET ORAL
COMMUNITY

## 2018-01-01 RX ORDER — MORPHINE SULFATE 4 MG/ML
INJECTION, SOLUTION INTRAMUSCULAR; INTRAVENOUS
Status: COMPLETED
Start: 2018-01-01 | End: 2018-01-01

## 2018-01-01 RX ORDER — GLYCOPYRROLATE 0.2 MG/ML
0.2 INJECTION INTRAMUSCULAR; INTRAVENOUS
Status: DISCONTINUED | OUTPATIENT
Start: 2018-01-01 | End: 2018-01-01 | Stop reason: HOSPADM

## 2018-01-01 RX ORDER — METHADONE HYDROCHLORIDE 5 MG/1
15 TABLET ORAL EVERY 8 HOURS
Status: DISCONTINUED | OUTPATIENT
Start: 2018-01-01 | End: 2018-01-01

## 2018-01-01 RX ORDER — METHADONE HYDROCHLORIDE 10 MG/1
10 TABLET ORAL 3 TIMES DAILY
Qty: 21 TAB | Refills: 0 | Status: SHIPPED | OUTPATIENT
Start: 2018-01-01 | End: 2018-01-01

## 2018-01-01 RX ORDER — OXYCODONE HYDROCHLORIDE 30 MG/1
60 TABLET ORAL
Qty: 94 TAB | Refills: 0 | Status: SHIPPED | OUTPATIENT
Start: 2018-01-01 | End: 2018-01-01 | Stop reason: CLARIF

## 2018-01-01 RX ORDER — LORAZEPAM 2 MG/ML
1 INJECTION INTRAMUSCULAR
Status: DISCONTINUED | OUTPATIENT
Start: 2018-01-01 | End: 2018-01-01

## 2018-01-01 RX ORDER — HYDROMORPHONE HYDROCHLORIDE 2 MG/ML
3 INJECTION, SOLUTION INTRAMUSCULAR; INTRAVENOUS; SUBCUTANEOUS
Status: COMPLETED | OUTPATIENT
Start: 2018-01-01 | End: 2018-01-01

## 2018-01-01 RX ORDER — FENTANYL CITRATE 50 UG/ML
200 INJECTION, SOLUTION INTRAMUSCULAR; INTRAVENOUS
Status: DISCONTINUED | OUTPATIENT
Start: 2018-01-01 | End: 2018-01-01 | Stop reason: HOSPADM

## 2018-01-01 RX ORDER — OXYCODONE HYDROCHLORIDE 30 MG/1
60 TABLET ORAL
Qty: 150 TAB | Refills: 0 | Status: SHIPPED | OUTPATIENT
Start: 2018-01-01 | End: 2018-01-01 | Stop reason: SDUPTHER

## 2018-01-01 RX ORDER — LORAZEPAM 2 MG/ML
INJECTION INTRAMUSCULAR
Status: DISPENSED
Start: 2018-01-01 | End: 2018-01-01

## 2018-01-01 RX ORDER — MORPHINE SULFATE 10 MG/ML
5 INJECTION, SOLUTION INTRAMUSCULAR; INTRAVENOUS
Status: DISCONTINUED | OUTPATIENT
Start: 2018-01-01 | End: 2018-01-01

## 2018-01-01 RX ORDER — HALOPERIDOL 2 MG/ML
2 SOLUTION ORAL
Qty: 15 ML | Refills: 1 | Status: SHIPPED | OUTPATIENT
Start: 2018-01-01

## 2018-01-01 RX ORDER — MIDAZOLAM HYDROCHLORIDE 5 MG/ML
5 INJECTION INTRAMUSCULAR; INTRAVENOUS EVERY 4 HOURS
Status: DISCONTINUED | OUTPATIENT
Start: 2018-01-01 | End: 2018-01-01 | Stop reason: SDUPTHER

## 2018-01-01 RX ORDER — FENTANYL CITRATE 50 UG/ML
100 INJECTION, SOLUTION INTRAMUSCULAR; INTRAVENOUS
Status: DISCONTINUED | OUTPATIENT
Start: 2018-01-01 | End: 2018-01-01

## 2018-01-01 RX ORDER — HYDROMORPHONE HYDROCHLORIDE 2 MG/ML
2 INJECTION, SOLUTION INTRAMUSCULAR; INTRAVENOUS; SUBCUTANEOUS
Status: DISCONTINUED | OUTPATIENT
Start: 2018-01-01 | End: 2018-01-01

## 2018-01-01 RX ORDER — PHENOBARBITAL SODIUM 65 MG/ML
65 INJECTION INTRAMUSCULAR
Status: DISCONTINUED | OUTPATIENT
Start: 2018-01-01 | End: 2018-01-01 | Stop reason: HOSPADM

## 2018-01-01 RX ORDER — OXYCODONE HYDROCHLORIDE 30 MG/1
60 TABLET ORAL
Qty: 94 TAB | Refills: 0 | Status: SHIPPED | OUTPATIENT
Start: 2018-01-01 | End: 2018-01-01 | Stop reason: SDUPTHER

## 2018-01-01 RX ORDER — FENTANYL CITRATE 50 UG/ML
100 INJECTION, SOLUTION INTRAMUSCULAR; INTRAVENOUS EVERY 4 HOURS
Status: DISCONTINUED | OUTPATIENT
Start: 2018-01-01 | End: 2018-01-01

## 2018-01-01 RX ORDER — MIDAZOLAM HYDROCHLORIDE 5 MG/ML
INJECTION INTRAMUSCULAR; INTRAVENOUS
Status: DISPENSED
Start: 2018-01-01 | End: 2018-01-01

## 2018-01-01 RX ORDER — OXYCODONE HYDROCHLORIDE 60 MG/1
120 TABLET, FILM COATED, EXTENDED RELEASE ORAL EVERY 8 HOURS
Qty: 180 EACH | Refills: 0 | Status: SHIPPED | OUTPATIENT
Start: 2018-01-01 | End: 2018-01-01 | Stop reason: SDUPTHER

## 2018-01-01 RX ORDER — MIDAZOLAM HYDROCHLORIDE 1 MG/ML
5 INJECTION, SOLUTION INTRAMUSCULAR; INTRAVENOUS
Status: DISCONTINUED | OUTPATIENT
Start: 2018-01-01 | End: 2018-01-01 | Stop reason: SDUPTHER

## 2018-01-01 RX ORDER — OXYCODONE HYDROCHLORIDE 5 MG/1
60 TABLET ORAL
Status: DISCONTINUED | OUTPATIENT
Start: 2018-01-01 | End: 2018-01-01

## 2018-01-01 RX ORDER — METHADONE HYDROCHLORIDE 10 MG/ML
15 CONCENTRATE ORAL EVERY 8 HOURS
Status: DISCONTINUED | OUTPATIENT
Start: 2018-01-01 | End: 2018-01-01

## 2018-01-01 RX ORDER — LOPERAMIDE HCL 2 MG
2 TABLET ORAL
Qty: 90 TAB | Refills: 2
Start: 2018-01-01 | End: 2018-01-01

## 2018-01-01 RX ORDER — ONDANSETRON 2 MG/ML
8 INJECTION INTRAMUSCULAR; INTRAVENOUS
Status: DISCONTINUED | OUTPATIENT
Start: 2018-01-01 | End: 2018-01-01

## 2018-01-01 RX ORDER — ONDANSETRON 2 MG/ML
4 INJECTION INTRAMUSCULAR; INTRAVENOUS EVERY 8 HOURS
Status: DISCONTINUED | OUTPATIENT
Start: 2018-01-01 | End: 2018-01-01

## 2018-01-01 RX ORDER — FENTANYL CITRATE 50 UG/ML
50 INJECTION, SOLUTION INTRAMUSCULAR; INTRAVENOUS
Status: DISCONTINUED | OUTPATIENT
Start: 2018-01-01 | End: 2018-01-01

## 2018-01-01 RX ORDER — MIDAZOLAM HYDROCHLORIDE 1 MG/ML
5 INJECTION, SOLUTION INTRAMUSCULAR; INTRAVENOUS EVERY 4 HOURS
Status: DISCONTINUED | OUTPATIENT
Start: 2018-01-01 | End: 2018-01-01 | Stop reason: SDUPTHER

## 2018-01-01 RX ORDER — NALOXONE HYDROCHLORIDE 4 MG/.1ML
SPRAY NASAL
Qty: 1 EACH | Refills: 0 | Status: SHIPPED | OUTPATIENT
Start: 2018-01-01

## 2018-01-01 RX ORDER — MIDAZOLAM HYDROCHLORIDE 5 MG/ML
5 INJECTION INTRAMUSCULAR; INTRAVENOUS
Status: DISCONTINUED | OUTPATIENT
Start: 2018-01-01 | End: 2018-01-01 | Stop reason: SDUPTHER

## 2018-01-01 RX ORDER — OXYCODONE HYDROCHLORIDE 5 MG/1
30 TABLET ORAL
Status: DISCONTINUED | OUTPATIENT
Start: 2018-01-01 | End: 2018-01-01

## 2018-01-01 RX ORDER — METHADONE HYDROCHLORIDE 5 MG/1
5 TABLET ORAL EVERY 8 HOURS
Status: DISCONTINUED | OUTPATIENT
Start: 2018-01-01 | End: 2018-01-01

## 2018-01-01 RX ORDER — FENTANYL CITRATE 50 UG/ML
200 INJECTION, SOLUTION INTRAMUSCULAR; INTRAVENOUS
Status: DISCONTINUED | OUTPATIENT
Start: 2018-01-01 | End: 2018-01-01

## 2018-01-01 RX ORDER — PANTOPRAZOLE SODIUM 20 MG/1
40 TABLET, DELAYED RELEASE ORAL DAILY
Status: DISCONTINUED | OUTPATIENT
Start: 2018-01-01 | End: 2018-01-01

## 2018-01-01 RX ORDER — MORPHINE SULFATE 2 MG/ML
INJECTION, SOLUTION INTRAMUSCULAR; INTRAVENOUS
Status: COMPLETED
Start: 2018-01-01 | End: 2018-01-01

## 2018-01-01 RX ORDER — OXYCODONE HYDROCHLORIDE 60 MG/1
120 TABLET, FILM COATED, EXTENDED RELEASE ORAL EVERY 8 HOURS
Qty: 180 EACH | Refills: 0 | Status: SHIPPED | OUTPATIENT
Start: 2018-01-01 | End: 2018-01-01

## 2018-01-01 RX ORDER — MIDAZOLAM HYDROCHLORIDE 5 MG/ML
5 INJECTION INTRAMUSCULAR; INTRAVENOUS
Status: DISCONTINUED | OUTPATIENT
Start: 2018-01-01 | End: 2018-01-01 | Stop reason: HOSPADM

## 2018-01-01 RX ORDER — METHADONE HYDROCHLORIDE 10 MG/ML
5 CONCENTRATE ORAL EVERY 8 HOURS
Status: DISCONTINUED | OUTPATIENT
Start: 2018-01-01 | End: 2018-01-01

## 2018-01-01 RX ORDER — OXYCODONE HYDROCHLORIDE 30 MG/1
60 TABLET ORAL
Qty: 150 TAB | Refills: 0 | Status: SHIPPED | OUTPATIENT
Start: 2018-01-01 | End: 2018-01-01

## 2018-01-01 RX ORDER — LORAZEPAM 2 MG/ML
1 INJECTION INTRAMUSCULAR EVERY 4 HOURS
Status: DISCONTINUED | OUTPATIENT
Start: 2018-01-01 | End: 2018-01-01

## 2018-01-01 RX ORDER — ONDANSETRON 4 MG/1
4 TABLET, ORALLY DISINTEGRATING ORAL
Qty: 90 TAB | Refills: 2 | Status: SHIPPED | OUTPATIENT
Start: 2018-01-01 | End: 2018-01-01

## 2018-01-01 RX ORDER — DICYCLOMINE HYDROCHLORIDE 10 MG/1
10 CAPSULE ORAL
Qty: 90 CAP | Refills: 5
Start: 2018-01-01

## 2018-01-01 RX ORDER — LORAZEPAM 2 MG/ML
4 INJECTION INTRAMUSCULAR EVERY 4 HOURS
Status: DISCONTINUED | OUTPATIENT
Start: 2018-01-01 | End: 2018-01-01 | Stop reason: HOSPADM

## 2018-01-01 RX ORDER — MIDAZOLAM HYDROCHLORIDE 5 MG/ML
5 INJECTION INTRAMUSCULAR; INTRAVENOUS EVERY 4 HOURS
Status: DISCONTINUED | OUTPATIENT
Start: 2018-01-01 | End: 2018-01-01 | Stop reason: HOSPADM

## 2018-01-01 RX ORDER — APIXABAN 5 MG/1
2.5 TABLET, FILM COATED ORAL 2 TIMES DAILY
Qty: 30 TAB | Refills: 0
Start: 2018-01-01 | End: 2018-01-01

## 2018-01-01 RX ORDER — METHADONE HYDROCHLORIDE 5 MG/5ML
5 SOLUTION ORAL EVERY 8 HOURS
Status: DISCONTINUED | OUTPATIENT
Start: 2018-01-01 | End: 2018-01-01

## 2018-01-01 RX ORDER — PHENOBARBITAL SODIUM 65 MG/ML
65 INJECTION INTRAMUSCULAR EVERY 6 HOURS
Status: DISCONTINUED | OUTPATIENT
Start: 2018-01-01 | End: 2018-01-01 | Stop reason: HOSPADM

## 2018-01-01 RX ORDER — LORAZEPAM 2 MG/ML
4 INJECTION INTRAMUSCULAR
Status: DISCONTINUED | OUTPATIENT
Start: 2018-01-01 | End: 2018-01-01 | Stop reason: HOSPADM

## 2018-01-01 RX ORDER — LORAZEPAM 2 MG/ML
0.5 INJECTION INTRAMUSCULAR EVERY 6 HOURS
Status: DISCONTINUED | OUTPATIENT
Start: 2018-01-01 | End: 2018-01-01

## 2018-01-01 RX ORDER — LORAZEPAM 2 MG/ML
4 INJECTION INTRAMUSCULAR ONCE
Status: COMPLETED | OUTPATIENT
Start: 2018-01-01 | End: 2018-01-01

## 2018-01-01 RX ORDER — ONDANSETRON 2 MG/ML
8 INJECTION INTRAMUSCULAR; INTRAVENOUS
Status: DISCONTINUED | OUTPATIENT
Start: 2018-01-01 | End: 2018-01-01 | Stop reason: HOSPADM

## 2018-01-01 RX ORDER — ACETAMINOPHEN 325 MG/1
650 TABLET ORAL
Status: DISCONTINUED | OUTPATIENT
Start: 2018-01-01 | End: 2018-01-01 | Stop reason: HOSPADM

## 2018-01-01 RX ORDER — DRONABINOL 2.5 MG/1
10 CAPSULE ORAL DAILY
Status: DISCONTINUED | OUTPATIENT
Start: 2018-01-01 | End: 2018-01-01

## 2018-01-01 RX ORDER — HYDROMORPHONE HYDROCHLORIDE 2 MG/ML
3 INJECTION, SOLUTION INTRAMUSCULAR; INTRAVENOUS; SUBCUTANEOUS ONCE
Status: DISCONTINUED | OUTPATIENT
Start: 2018-01-01 | End: 2018-01-01

## 2018-01-01 RX ORDER — DEXAMETHASONE SODIUM PHOSPHATE 4 MG/ML
4 INJECTION, SOLUTION INTRA-ARTICULAR; INTRALESIONAL; INTRAMUSCULAR; INTRAVENOUS; SOFT TISSUE 2 TIMES DAILY
Status: DISCONTINUED | OUTPATIENT
Start: 2018-01-01 | End: 2018-01-01

## 2018-01-01 RX ORDER — ONDANSETRON 2 MG/ML
4 INJECTION INTRAMUSCULAR; INTRAVENOUS EVERY 6 HOURS
Status: DISCONTINUED | OUTPATIENT
Start: 2018-01-01 | End: 2018-01-01

## 2018-01-01 RX ORDER — AMOXICILLIN 250 MG
2 CAPSULE ORAL 2 TIMES DAILY
Status: DISCONTINUED | OUTPATIENT
Start: 2018-01-01 | End: 2018-01-01

## 2018-01-01 RX ORDER — MORPHINE SULFATE 8 MG/ML
5 INJECTION, SOLUTION INTRAMUSCULAR; INTRAVENOUS ONCE
Status: COMPLETED | OUTPATIENT
Start: 2018-01-01 | End: 2018-01-01

## 2018-01-01 RX ORDER — SCOLOPAMINE TRANSDERMAL SYSTEM 1 MG/1
1 PATCH, EXTENDED RELEASE TRANSDERMAL
Status: DISCONTINUED | OUTPATIENT
Start: 2018-01-01 | End: 2018-01-01 | Stop reason: HOSPADM

## 2018-01-01 RX ADMIN — MIDAZOLAM HYDROCHLORIDE 5 MG: 1 INJECTION, SOLUTION INTRAMUSCULAR; INTRAVENOUS at 23:00

## 2018-01-01 RX ADMIN — ONDANSETRON 4 MG: 2 INJECTION INTRAMUSCULAR; INTRAVENOUS at 17:40

## 2018-01-01 RX ADMIN — FENTANYL CITRATE 200 MCG: 50 INJECTION, SOLUTION INTRAMUSCULAR; INTRAVENOUS at 05:30

## 2018-01-01 RX ADMIN — FENTANYL CITRATE 50 MCG: 50 INJECTION, SOLUTION INTRAMUSCULAR; INTRAVENOUS at 05:50

## 2018-01-01 RX ADMIN — FENTANYL CITRATE 200 MCG: 50 INJECTION, SOLUTION INTRAMUSCULAR; INTRAVENOUS at 15:04

## 2018-01-01 RX ADMIN — ONDANSETRON 4 MG: 2 INJECTION INTRAMUSCULAR; INTRAVENOUS at 06:15

## 2018-01-01 RX ADMIN — FENTANYL CITRATE 100 MCG: 50 INJECTION, SOLUTION INTRAMUSCULAR; INTRAVENOUS at 16:10

## 2018-01-01 RX ADMIN — LORAZEPAM 4 MG: 2 INJECTION INTRAMUSCULAR; INTRAVENOUS at 23:08

## 2018-01-01 RX ADMIN — LORAZEPAM 4 MG: 2 INJECTION INTRAMUSCULAR; INTRAVENOUS at 05:36

## 2018-01-01 RX ADMIN — LORAZEPAM 1 MG: 2 INJECTION INTRAMUSCULAR; INTRAVENOUS at 10:19

## 2018-01-01 RX ADMIN — MIDAZOLAM HYDROCHLORIDE 5 MG: 1 INJECTION, SOLUTION INTRAMUSCULAR; INTRAVENOUS at 18:55

## 2018-01-01 RX ADMIN — DEXAMETHASONE SODIUM PHOSPHATE 4 MG: 4 INJECTION, SOLUTION INTRAMUSCULAR; INTRAVENOUS at 18:51

## 2018-01-01 RX ADMIN — FENTANYL CITRATE 200 MCG: 50 INJECTION, SOLUTION INTRAMUSCULAR; INTRAVENOUS at 00:07

## 2018-01-01 RX ADMIN — LORAZEPAM 4 MG: 2 INJECTION INTRAMUSCULAR; INTRAVENOUS at 02:51

## 2018-01-01 RX ADMIN — FENTANYL CITRATE 100 MCG: 50 INJECTION, SOLUTION INTRAMUSCULAR; INTRAVENOUS at 20:26

## 2018-01-01 RX ADMIN — METHADONE HYDROCHLORIDE 15 MG: 10 CONCENTRATE ORAL at 22:00

## 2018-01-01 RX ADMIN — LORAZEPAM 0.5 MG: 2 INJECTION INTRAMUSCULAR; INTRAVENOUS at 17:41

## 2018-01-01 RX ADMIN — DEXAMETHASONE SODIUM PHOSPHATE 4 MG: 4 INJECTION, SOLUTION INTRAMUSCULAR; INTRAVENOUS at 09:07

## 2018-01-01 RX ADMIN — ONDANSETRON 8 MG: 2 INJECTION INTRAMUSCULAR; INTRAVENOUS at 23:02

## 2018-01-01 RX ADMIN — GLYCOPYRROLATE 0.2 MG: 0.2 INJECTION INTRAMUSCULAR; INTRAVENOUS at 11:00

## 2018-01-01 RX ADMIN — PHENOBARBITAL SODIUM 65 MG: 65 INJECTION INTRAMUSCULAR; INTRAVENOUS at 11:28

## 2018-01-01 RX ADMIN — FENTANYL CITRATE 100 MCG: 50 INJECTION, SOLUTION INTRAMUSCULAR; INTRAVENOUS at 10:02

## 2018-01-01 RX ADMIN — MIDAZOLAM HYDROCHLORIDE 5 MG: 1 INJECTION, SOLUTION INTRAMUSCULAR; INTRAVENOUS at 19:00

## 2018-01-01 RX ADMIN — MIDAZOLAM HYDROCHLORIDE 5 MG: 5 INJECTION INTRAMUSCULAR; INTRAVENOUS at 04:28

## 2018-01-01 RX ADMIN — METHADONE HYDROCHLORIDE 15 MG: 10 CONCENTRATE ORAL at 21:34

## 2018-01-01 RX ADMIN — PHENOBARBITAL SODIUM 65 MG: 65 INJECTION INTRAMUSCULAR; INTRAVENOUS at 06:14

## 2018-01-01 RX ADMIN — FENTANYL CITRATE 100 MCG: 50 INJECTION, SOLUTION INTRAMUSCULAR; INTRAVENOUS at 18:08

## 2018-01-01 RX ADMIN — METHADONE HYDROCHLORIDE 5 MG: 10 CONCENTRATE ORAL at 05:40

## 2018-01-01 RX ADMIN — MIDAZOLAM HYDROCHLORIDE 5 MG: 1 INJECTION, SOLUTION INTRAMUSCULAR; INTRAVENOUS at 20:00

## 2018-01-01 RX ADMIN — PHENOBARBITAL SODIUM 65 MG: 65 INJECTION INTRAMUSCULAR; INTRAVENOUS at 20:00

## 2018-01-01 RX ADMIN — MIDAZOLAM HYDROCHLORIDE 5 MG: 1 INJECTION, SOLUTION INTRAMUSCULAR; INTRAVENOUS at 14:17

## 2018-01-01 RX ADMIN — METHADONE HYDROCHLORIDE 15 MG: 10 CONCENTRATE ORAL at 05:57

## 2018-01-01 RX ADMIN — FENTANYL CITRATE 100 MCG: 50 INJECTION, SOLUTION INTRAMUSCULAR; INTRAVENOUS at 07:44

## 2018-01-01 RX ADMIN — ONDANSETRON 4 MG: 2 INJECTION INTRAMUSCULAR; INTRAVENOUS at 12:16

## 2018-01-01 RX ADMIN — MIDAZOLAM HYDROCHLORIDE 5 MG: 5 INJECTION INTRAMUSCULAR; INTRAVENOUS at 00:29

## 2018-01-01 RX ADMIN — DEXAMETHASONE SODIUM PHOSPHATE 4 MG: 4 INJECTION, SOLUTION INTRAMUSCULAR; INTRAVENOUS at 11:29

## 2018-01-01 RX ADMIN — OXYCODONE HYDROCHLORIDE 30 MG: 5 TABLET ORAL at 03:17

## 2018-01-01 RX ADMIN — FENTANYL CITRATE 200 MCG: 50 INJECTION, SOLUTION INTRAMUSCULAR; INTRAVENOUS at 03:26

## 2018-01-01 RX ADMIN — FENTANYL CITRATE 200 MCG: 50 INJECTION, SOLUTION INTRAMUSCULAR; INTRAVENOUS at 08:34

## 2018-01-01 RX ADMIN — FENTANYL CITRATE 200 MCG: 50 INJECTION, SOLUTION INTRAMUSCULAR; INTRAVENOUS at 21:05

## 2018-01-01 RX ADMIN — FENTANYL CITRATE 200 MCG: 50 INJECTION, SOLUTION INTRAMUSCULAR; INTRAVENOUS at 11:29

## 2018-01-01 RX ADMIN — LORAZEPAM 4 MG: 2 INJECTION INTRAMUSCULAR; INTRAVENOUS at 11:44

## 2018-01-01 RX ADMIN — ONDANSETRON 4 MG: 2 INJECTION INTRAMUSCULAR; INTRAVENOUS at 12:00

## 2018-01-01 RX ADMIN — METHADONE HYDROCHLORIDE 15 MG: 10 CONCENTRATE ORAL at 14:40

## 2018-01-01 RX ADMIN — FENTANYL CITRATE 100 MCG: 50 INJECTION, SOLUTION INTRAMUSCULAR; INTRAVENOUS at 19:30

## 2018-01-01 RX ADMIN — MIDAZOLAM HYDROCHLORIDE 5 MG: 5 INJECTION, SOLUTION INTRAMUSCULAR; INTRAVENOUS at 22:56

## 2018-01-01 RX ADMIN — FENTANYL CITRATE 200 MCG: 50 INJECTION, SOLUTION INTRAMUSCULAR; INTRAVENOUS at 15:21

## 2018-01-01 RX ADMIN — LORAZEPAM 1 MG: 2 INJECTION INTRAMUSCULAR; INTRAVENOUS at 00:07

## 2018-01-01 RX ADMIN — FENTANYL CITRATE 200 MCG: 50 INJECTION, SOLUTION INTRAMUSCULAR; INTRAVENOUS at 14:39

## 2018-01-01 RX ADMIN — ONDANSETRON 4 MG: 2 INJECTION INTRAMUSCULAR; INTRAVENOUS at 23:57

## 2018-01-01 RX ADMIN — FENTANYL CITRATE 200 MCG: 50 INJECTION, SOLUTION INTRAMUSCULAR; INTRAVENOUS at 13:25

## 2018-01-01 RX ADMIN — LORAZEPAM 0.5 MG: 2 INJECTION INTRAMUSCULAR; INTRAVENOUS at 12:04

## 2018-01-01 RX ADMIN — ONDANSETRON 4 MG: 2 INJECTION INTRAMUSCULAR; INTRAVENOUS at 23:46

## 2018-01-01 RX ADMIN — ONDANSETRON 4 MG: 2 INJECTION INTRAMUSCULAR; INTRAVENOUS at 18:13

## 2018-01-01 RX ADMIN — DEXAMETHASONE SODIUM PHOSPHATE 4 MG: 4 INJECTION, SOLUTION INTRAMUSCULAR; INTRAVENOUS at 07:57

## 2018-01-01 RX ADMIN — MIDAZOLAM HYDROCHLORIDE 5 MG: 5 INJECTION INTRAMUSCULAR; INTRAVENOUS at 06:19

## 2018-01-01 RX ADMIN — PANTOPRAZOLE SODIUM 40 MG: 20 TABLET, DELAYED RELEASE ORAL at 22:54

## 2018-01-01 RX ADMIN — LORAZEPAM 4 MG: 2 INJECTION INTRAMUSCULAR; INTRAVENOUS at 15:04

## 2018-01-01 RX ADMIN — FENTANYL CITRATE 200 MCG: 50 INJECTION, SOLUTION INTRAMUSCULAR; INTRAVENOUS at 03:01

## 2018-01-01 RX ADMIN — METHADONE HYDROCHLORIDE 5 MG: 10 CONCENTRATE ORAL at 21:05

## 2018-01-01 RX ADMIN — LORAZEPAM 4 MG: 2 INJECTION INTRAMUSCULAR; INTRAVENOUS at 22:27

## 2018-01-01 RX ADMIN — FENTANYL CITRATE 100 MCG: 50 INJECTION, SOLUTION INTRAMUSCULAR; INTRAVENOUS at 08:50

## 2018-01-01 RX ADMIN — ONDANSETRON 4 MG: 2 INJECTION INTRAMUSCULAR; INTRAVENOUS at 12:12

## 2018-01-01 RX ADMIN — FENTANYL CITRATE 200 MCG: 50 INJECTION, SOLUTION INTRAMUSCULAR; INTRAVENOUS at 03:12

## 2018-01-01 RX ADMIN — ONDANSETRON 4 MG: 2 INJECTION INTRAMUSCULAR; INTRAVENOUS at 06:01

## 2018-01-01 RX ADMIN — METHADONE HYDROCHLORIDE 15 MG: 10 CONCENTRATE ORAL at 06:00

## 2018-01-01 RX ADMIN — LORAZEPAM 1 MG: 2 INJECTION INTRAMUSCULAR; INTRAVENOUS at 08:15

## 2018-01-01 RX ADMIN — FENTANYL CITRATE 50 MCG: 50 INJECTION, SOLUTION INTRAMUSCULAR; INTRAVENOUS at 03:00

## 2018-01-01 RX ADMIN — LORAZEPAM 4 MG: 2 INJECTION INTRAMUSCULAR; INTRAVENOUS at 12:01

## 2018-01-01 RX ADMIN — MIDAZOLAM HYDROCHLORIDE 5 MG: 1 INJECTION, SOLUTION INTRAMUSCULAR; INTRAVENOUS at 16:23

## 2018-01-01 RX ADMIN — FENTANYL CITRATE 100 MCG: 50 INJECTION, SOLUTION INTRAMUSCULAR; INTRAVENOUS at 02:09

## 2018-01-01 RX ADMIN — PHENOBARBITAL SODIUM 65 MG: 65 INJECTION INTRAMUSCULAR; INTRAVENOUS at 00:29

## 2018-01-01 RX ADMIN — GLYCOPYRROLATE 0.2 MG: 0.2 INJECTION INTRAMUSCULAR; INTRAVENOUS at 20:00

## 2018-01-01 RX ADMIN — ONDANSETRON 8 MG: 2 INJECTION INTRAMUSCULAR; INTRAVENOUS at 03:00

## 2018-01-01 RX ADMIN — MIDAZOLAM HYDROCHLORIDE 5 MG: 1 INJECTION, SOLUTION INTRAMUSCULAR; INTRAVENOUS at 12:04

## 2018-01-01 RX ADMIN — DEXAMETHASONE SODIUM PHOSPHATE 4 MG: 4 INJECTION, SOLUTION INTRAMUSCULAR; INTRAVENOUS at 08:15

## 2018-01-01 RX ADMIN — MIDAZOLAM HYDROCHLORIDE 5 MG: 5 INJECTION, SOLUTION INTRAMUSCULAR; INTRAVENOUS at 22:28

## 2018-01-01 RX ADMIN — LORAZEPAM 1 MG: 2 INJECTION INTRAMUSCULAR; INTRAVENOUS at 15:21

## 2018-01-01 RX ADMIN — PANTOPRAZOLE SODIUM 40 MG: 20 TABLET, DELAYED RELEASE ORAL at 12:00

## 2018-01-01 RX ADMIN — FENTANYL CITRATE 200 MCG: 50 INJECTION, SOLUTION INTRAMUSCULAR; INTRAVENOUS at 12:16

## 2018-01-01 RX ADMIN — LORAZEPAM 4 MG: 2 INJECTION INTRAMUSCULAR; INTRAVENOUS at 19:04

## 2018-01-01 RX ADMIN — MIDAZOLAM HYDROCHLORIDE 5 MG: 1 INJECTION, SOLUTION INTRAMUSCULAR; INTRAVENOUS at 07:00

## 2018-01-01 RX ADMIN — LORAZEPAM 4 MG: 2 INJECTION INTRAMUSCULAR; INTRAVENOUS at 06:40

## 2018-01-01 RX ADMIN — ONDANSETRON 4 MG: 2 INJECTION INTRAMUSCULAR; INTRAVENOUS at 18:47

## 2018-01-01 RX ADMIN — FENTANYL CITRATE 100 MCG: 50 INJECTION, SOLUTION INTRAMUSCULAR; INTRAVENOUS at 04:04

## 2018-01-01 RX ADMIN — FENTANYL CITRATE 100 MCG: 50 INJECTION, SOLUTION INTRAMUSCULAR; INTRAVENOUS at 02:31

## 2018-01-01 RX ADMIN — GLYCOPYRROLATE 0.2 MG: 0.2 INJECTION INTRAMUSCULAR; INTRAVENOUS at 13:34

## 2018-01-01 RX ADMIN — FENTANYL CITRATE 100 MCG: 50 INJECTION, SOLUTION INTRAMUSCULAR; INTRAVENOUS at 20:23

## 2018-01-01 RX ADMIN — FENTANYL CITRATE 200 MCG: 50 INJECTION, SOLUTION INTRAMUSCULAR; INTRAVENOUS at 12:28

## 2018-01-01 RX ADMIN — FENTANYL CITRATE 100 MCG: 50 INJECTION, SOLUTION INTRAMUSCULAR; INTRAVENOUS at 23:47

## 2018-01-01 RX ADMIN — ONDANSETRON 4 MG: 2 INJECTION INTRAMUSCULAR; INTRAVENOUS at 23:56

## 2018-01-01 RX ADMIN — LORAZEPAM 4 MG: 2 INJECTION INTRAMUSCULAR; INTRAVENOUS at 11:28

## 2018-01-01 RX ADMIN — METHADONE HYDROCHLORIDE 5 MG: 10 CONCENTRATE ORAL at 13:57

## 2018-01-01 RX ADMIN — MIDAZOLAM HYDROCHLORIDE 5 MG: 5 INJECTION INTRAMUSCULAR; INTRAVENOUS at 20:24

## 2018-01-01 RX ADMIN — FENTANYL CITRATE 100 MCG: 50 INJECTION, SOLUTION INTRAMUSCULAR; INTRAVENOUS at 21:46

## 2018-01-01 RX ADMIN — FENTANYL CITRATE 100 MCG: 50 INJECTION, SOLUTION INTRAMUSCULAR; INTRAVENOUS at 04:00

## 2018-01-01 RX ADMIN — STANDARDIZED SENNA CONCENTRATE AND DOCUSATE SODIUM 1 TABLET: 8.6; 5 TABLET, FILM COATED ORAL at 20:08

## 2018-01-01 RX ADMIN — FENTANYL CITRATE 200 MCG: 50 INJECTION, SOLUTION INTRAMUSCULAR; INTRAVENOUS at 18:13

## 2018-01-01 RX ADMIN — FENTANYL CITRATE 200 MCG: 50 INJECTION, SOLUTION INTRAMUSCULAR; INTRAVENOUS at 12:42

## 2018-01-01 RX ADMIN — METHADONE HYDROCHLORIDE 5 MG: 10 CONCENTRATE ORAL at 21:29

## 2018-01-01 RX ADMIN — PHENOBARBITAL SODIUM 65 MG: 65 INJECTION INTRAMUSCULAR; INTRAVENOUS at 14:23

## 2018-01-01 RX ADMIN — FENTANYL CITRATE 200 MCG: 50 INJECTION, SOLUTION INTRAMUSCULAR; INTRAVENOUS at 18:28

## 2018-01-01 RX ADMIN — FENTANYL CITRATE 100 MCG: 50 INJECTION, SOLUTION INTRAMUSCULAR; INTRAVENOUS at 12:22

## 2018-01-01 RX ADMIN — FENTANYL CITRATE 50 MCG: 50 INJECTION, SOLUTION INTRAMUSCULAR; INTRAVENOUS at 23:41

## 2018-01-01 RX ADMIN — MIDAZOLAM HYDROCHLORIDE 5 MG: 5 INJECTION, SOLUTION INTRAMUSCULAR; INTRAVENOUS at 06:13

## 2018-01-01 RX ADMIN — PHENOBARBITAL SODIUM 65 MG: 65 INJECTION INTRAMUSCULAR; INTRAVENOUS at 18:12

## 2018-01-01 RX ADMIN — LORAZEPAM 1 MG: 2 INJECTION INTRAMUSCULAR; INTRAVENOUS at 22:57

## 2018-01-01 RX ADMIN — LORAZEPAM 4 MG: 2 INJECTION INTRAMUSCULAR; INTRAVENOUS at 06:14

## 2018-01-01 RX ADMIN — DEXAMETHASONE SODIUM PHOSPHATE 4 MG: 4 INJECTION, SOLUTION INTRAMUSCULAR; INTRAVENOUS at 18:00

## 2018-01-01 RX ADMIN — LORAZEPAM 1 MG: 2 INJECTION INTRAMUSCULAR; INTRAVENOUS at 20:34

## 2018-01-01 RX ADMIN — ONDANSETRON 4 MG: 2 INJECTION INTRAMUSCULAR; INTRAVENOUS at 18:09

## 2018-01-01 RX ADMIN — FENTANYL CITRATE 50 MCG: 50 INJECTION, SOLUTION INTRAMUSCULAR; INTRAVENOUS at 20:35

## 2018-01-01 RX ADMIN — FENTANYL CITRATE 200 MCG: 50 INJECTION, SOLUTION INTRAMUSCULAR; INTRAVENOUS at 15:58

## 2018-01-01 RX ADMIN — FENTANYL CITRATE 200 MCG: 50 INJECTION, SOLUTION INTRAMUSCULAR; INTRAVENOUS at 17:00

## 2018-01-01 RX ADMIN — STANDARDIZED SENNA CONCENTRATE AND DOCUSATE SODIUM 2 TABLET: 8.6; 5 TABLET, FILM COATED ORAL at 21:29

## 2018-01-01 RX ADMIN — FENTANYL CITRATE 100 MCG: 50 INJECTION, SOLUTION INTRAMUSCULAR; INTRAVENOUS at 06:00

## 2018-01-01 RX ADMIN — PHENOBARBITAL SODIUM 65 MG: 65 INJECTION INTRAMUSCULAR; INTRAVENOUS at 13:19

## 2018-01-01 RX ADMIN — STANDARDIZED SENNA CONCENTRATE AND DOCUSATE SODIUM 2 TABLET: 8.6; 5 TABLET, FILM COATED ORAL at 07:56

## 2018-01-01 RX ADMIN — FENTANYL CITRATE 100 MCG: 50 INJECTION, SOLUTION INTRAMUSCULAR; INTRAVENOUS at 21:50

## 2018-01-01 RX ADMIN — METHADONE HYDROCHLORIDE 15 MG: 10 CONCENTRATE ORAL at 14:00

## 2018-01-01 RX ADMIN — FENTANYL CITRATE 100 MCG: 50 INJECTION, SOLUTION INTRAMUSCULAR; INTRAVENOUS at 17:57

## 2018-01-01 RX ADMIN — ONDANSETRON 4 MG: 2 INJECTION INTRAMUSCULAR; INTRAVENOUS at 06:24

## 2018-01-01 RX ADMIN — FENTANYL CITRATE 200 MCG: 50 INJECTION, SOLUTION INTRAMUSCULAR; INTRAVENOUS at 21:16

## 2018-01-01 RX ADMIN — HYDROMORPHONE HYDROCHLORIDE 2 MG: 2 INJECTION INTRAMUSCULAR; INTRAVENOUS; SUBCUTANEOUS at 17:41

## 2018-01-01 RX ADMIN — FENTANYL CITRATE 200 MCG: 50 INJECTION, SOLUTION INTRAMUSCULAR; INTRAVENOUS at 08:56

## 2018-01-01 RX ADMIN — FENTANYL CITRATE 200 MCG: 50 INJECTION, SOLUTION INTRAMUSCULAR; INTRAVENOUS at 15:36

## 2018-01-01 RX ADMIN — LORAZEPAM 1 MG: 2 INJECTION INTRAMUSCULAR; INTRAVENOUS at 03:00

## 2018-01-01 RX ADMIN — OXYCODONE HYDROCHLORIDE 30 MG: 5 TABLET ORAL at 22:58

## 2018-01-01 RX ADMIN — OXYCODONE HYDROCHLORIDE 30 MG: 5 TABLET ORAL at 10:29

## 2018-01-01 RX ADMIN — LORAZEPAM 4 MG: 2 INJECTION INTRAMUSCULAR; INTRAVENOUS at 13:33

## 2018-01-01 RX ADMIN — LORAZEPAM 1 MG: 2 INJECTION INTRAMUSCULAR; INTRAVENOUS at 10:48

## 2018-01-01 RX ADMIN — FENTANYL CITRATE 100 MCG: 50 INJECTION, SOLUTION INTRAMUSCULAR; INTRAVENOUS at 21:55

## 2018-01-01 RX ADMIN — FENTANYL CITRATE 100 MCG: 50 INJECTION, SOLUTION INTRAMUSCULAR; INTRAVENOUS at 12:03

## 2018-01-01 RX ADMIN — FENTANYL CITRATE 200 MCG: 50 INJECTION, SOLUTION INTRAMUSCULAR; INTRAVENOUS at 00:19

## 2018-01-01 RX ADMIN — LORAZEPAM 1 MG: 2 INJECTION INTRAMUSCULAR; INTRAVENOUS at 03:12

## 2018-01-01 RX ADMIN — FENTANYL CITRATE 200 MCG: 50 INJECTION, SOLUTION INTRAMUSCULAR; INTRAVENOUS at 10:36

## 2018-01-01 RX ADMIN — ONDANSETRON 4 MG: 2 INJECTION INTRAMUSCULAR; INTRAVENOUS at 05:59

## 2018-01-01 RX ADMIN — FENTANYL CITRATE 200 MCG: 50 INJECTION, SOLUTION INTRAMUSCULAR; INTRAVENOUS at 04:28

## 2018-01-01 RX ADMIN — LORAZEPAM 1 MG: 2 INJECTION INTRAMUSCULAR; INTRAVENOUS at 14:46

## 2018-01-01 RX ADMIN — LORAZEPAM 1 MG: 2 INJECTION INTRAMUSCULAR; INTRAVENOUS at 21:15

## 2018-01-01 RX ADMIN — LORAZEPAM 1 MG: 2 INJECTION INTRAMUSCULAR; INTRAVENOUS at 23:57

## 2018-01-01 RX ADMIN — LORAZEPAM 4 MG: 2 INJECTION INTRAMUSCULAR; INTRAVENOUS at 15:46

## 2018-01-01 RX ADMIN — MORPHINE SULFATE 5 MG: 4 INJECTION, SOLUTION INTRAMUSCULAR; INTRAVENOUS at 13:00

## 2018-01-01 RX ADMIN — LORAZEPAM 1 MG: 2 INJECTION INTRAMUSCULAR; INTRAVENOUS at 03:26

## 2018-01-01 RX ADMIN — FENTANYL CITRATE 200 MCG: 50 INJECTION, SOLUTION INTRAMUSCULAR; INTRAVENOUS at 17:39

## 2018-01-01 RX ADMIN — MIDAZOLAM HYDROCHLORIDE 5 MG: 1 INJECTION, SOLUTION INTRAMUSCULAR; INTRAVENOUS at 03:00

## 2018-01-01 RX ADMIN — STANDARDIZED SENNA CONCENTRATE AND DOCUSATE SODIUM 2 TABLET: 8.6; 5 TABLET, FILM COATED ORAL at 07:44

## 2018-01-01 RX ADMIN — PHENOBARBITAL SODIUM 65 MG: 65 INJECTION INTRAMUSCULAR; INTRAVENOUS at 00:57

## 2018-01-01 RX ADMIN — ACETAMINOPHEN 650 MG: 325 TABLET, FILM COATED ORAL at 15:17

## 2018-01-01 RX ADMIN — LORAZEPAM 4 MG: 2 INJECTION INTRAMUSCULAR; INTRAVENOUS at 11:09

## 2018-01-01 RX ADMIN — FENTANYL CITRATE 200 MCG: 50 INJECTION, SOLUTION INTRAMUSCULAR; INTRAVENOUS at 21:24

## 2018-01-01 RX ADMIN — LORAZEPAM 0.5 MG: 2 INJECTION INTRAMUSCULAR; INTRAVENOUS at 00:15

## 2018-01-01 RX ADMIN — LORAZEPAM 4 MG: 2 INJECTION INTRAMUSCULAR; INTRAVENOUS at 14:57

## 2018-01-01 RX ADMIN — FENTANYL CITRATE 200 MCG: 50 INJECTION, SOLUTION INTRAMUSCULAR; INTRAVENOUS at 17:43

## 2018-01-01 RX ADMIN — MIDAZOLAM HYDROCHLORIDE 5 MG: 1 INJECTION, SOLUTION INTRAMUSCULAR; INTRAVENOUS at 12:00

## 2018-01-01 RX ADMIN — LORAZEPAM 4 MG: 2 INJECTION INTRAMUSCULAR; INTRAVENOUS at 03:09

## 2018-01-01 RX ADMIN — LORAZEPAM 4 MG: 2 INJECTION INTRAMUSCULAR; INTRAVENOUS at 20:00

## 2018-01-01 RX ADMIN — FENTANYL CITRATE 200 MCG: 50 INJECTION, SOLUTION INTRAMUSCULAR; INTRAVENOUS at 12:46

## 2018-01-01 RX ADMIN — FENTANYL CITRATE 200 MCG: 50 INJECTION, SOLUTION INTRAMUSCULAR; INTRAVENOUS at 03:33

## 2018-01-01 RX ADMIN — FENTANYL CITRATE 100 MCG: 50 INJECTION, SOLUTION INTRAMUSCULAR; INTRAVENOUS at 03:19

## 2018-01-01 RX ADMIN — LORAZEPAM 1 MG: 2 INJECTION INTRAMUSCULAR; INTRAVENOUS at 03:34

## 2018-01-01 RX ADMIN — ONDANSETRON 4 MG: 2 INJECTION INTRAMUSCULAR; INTRAVENOUS at 00:19

## 2018-01-01 RX ADMIN — DEXAMETHASONE SODIUM PHOSPHATE 4 MG: 4 INJECTION, SOLUTION INTRAMUSCULAR; INTRAVENOUS at 10:28

## 2018-01-01 RX ADMIN — FENTANYL CITRATE 200 MCG: 50 INJECTION, SOLUTION INTRAMUSCULAR; INTRAVENOUS at 10:10

## 2018-01-01 RX ADMIN — METHADONE HYDROCHLORIDE 15 MG: 5 TABLET ORAL at 13:29

## 2018-01-01 RX ADMIN — ONDANSETRON 8 MG: 2 INJECTION INTRAMUSCULAR; INTRAVENOUS at 10:19

## 2018-01-01 RX ADMIN — FENTANYL CITRATE 200 MCG: 50 INJECTION, SOLUTION INTRAMUSCULAR; INTRAVENOUS at 17:09

## 2018-01-01 RX ADMIN — LORAZEPAM 1 MG: 2 INJECTION INTRAMUSCULAR; INTRAVENOUS at 12:29

## 2018-01-01 RX ADMIN — HYDROMORPHONE HYDROCHLORIDE 2 MG: 2 INJECTION INTRAMUSCULAR; INTRAVENOUS; SUBCUTANEOUS at 04:30

## 2018-01-01 RX ADMIN — ONDANSETRON 4 MG: 2 INJECTION INTRAMUSCULAR; INTRAVENOUS at 11:30

## 2018-01-01 RX ADMIN — MORPHINE SULFATE 5 MG: 8 INJECTION, SOLUTION INTRAMUSCULAR; INTRAVENOUS at 12:30

## 2018-01-01 RX ADMIN — ONDANSETRON 4 MG: 2 INJECTION INTRAMUSCULAR; INTRAVENOUS at 23:48

## 2018-01-01 RX ADMIN — FENTANYL CITRATE 200 MCG: 50 INJECTION, SOLUTION INTRAMUSCULAR; INTRAVENOUS at 06:01

## 2018-01-01 RX ADMIN — FENTANYL CITRATE 100 MCG: 50 INJECTION, SOLUTION INTRAMUSCULAR; INTRAVENOUS at 15:49

## 2018-01-01 RX ADMIN — FENTANYL CITRATE 200 MCG: 50 INJECTION, SOLUTION INTRAMUSCULAR; INTRAVENOUS at 11:10

## 2018-01-01 RX ADMIN — ONDANSETRON 4 MG: 2 INJECTION INTRAMUSCULAR; INTRAVENOUS at 12:03

## 2018-01-01 RX ADMIN — FENTANYL CITRATE 200 MCG: 50 INJECTION, SOLUTION INTRAMUSCULAR; INTRAVENOUS at 19:12

## 2018-01-01 RX ADMIN — MORPHINE SULFATE 5 MG: 2 INJECTION, SOLUTION INTRAMUSCULAR; INTRAVENOUS at 13:00

## 2018-01-01 RX ADMIN — LORAZEPAM 1 MG: 2 INJECTION INTRAMUSCULAR; INTRAVENOUS at 23:46

## 2018-01-01 RX ADMIN — FENTANYL CITRATE 200 MCG: 50 INJECTION, SOLUTION INTRAMUSCULAR; INTRAVENOUS at 18:00

## 2018-01-01 RX ADMIN — FENTANYL CITRATE 200 MCG: 50 INJECTION, SOLUTION INTRAMUSCULAR; INTRAVENOUS at 10:25

## 2018-01-01 RX ADMIN — ONDANSETRON 4 MG: 2 INJECTION INTRAMUSCULAR; INTRAVENOUS at 12:23

## 2018-01-01 RX ADMIN — MIDAZOLAM HYDROCHLORIDE 5 MG: 1 INJECTION, SOLUTION INTRAMUSCULAR; INTRAVENOUS at 17:38

## 2018-01-01 RX ADMIN — LORAZEPAM 0.5 MG: 2 INJECTION INTRAMUSCULAR; INTRAVENOUS at 06:15

## 2018-01-01 RX ADMIN — FENTANYL CITRATE 200 MCG: 50 INJECTION, SOLUTION INTRAMUSCULAR; INTRAVENOUS at 18:08

## 2018-01-01 RX ADMIN — PHENOBARBITAL SODIUM 65 MG: 65 INJECTION INTRAMUSCULAR; INTRAVENOUS at 06:40

## 2018-01-01 RX ADMIN — HYDROMORPHONE HYDROCHLORIDE 2 MG: 2 INJECTION INTRAMUSCULAR; INTRAVENOUS; SUBCUTANEOUS at 13:38

## 2018-01-01 RX ADMIN — GLYCOPYRROLATE 0.2 MG: 0.2 INJECTION INTRAMUSCULAR; INTRAVENOUS at 00:55

## 2018-01-01 RX ADMIN — MIDAZOLAM HYDROCHLORIDE 5 MG: 5 INJECTION, SOLUTION INTRAMUSCULAR; INTRAVENOUS at 02:52

## 2018-01-01 RX ADMIN — FENTANYL CITRATE 200 MCG: 50 INJECTION, SOLUTION INTRAMUSCULAR; INTRAVENOUS at 23:46

## 2018-01-01 RX ADMIN — ONDANSETRON 8 MG: 2 INJECTION INTRAMUSCULAR; INTRAVENOUS at 08:33

## 2018-01-01 RX ADMIN — FENTANYL CITRATE 200 MCG: 50 INJECTION, SOLUTION INTRAMUSCULAR; INTRAVENOUS at 13:59

## 2018-01-01 RX ADMIN — ONDANSETRON 4 MG: 2 INJECTION INTRAMUSCULAR; INTRAVENOUS at 00:08

## 2018-01-01 RX ADMIN — ONDANSETRON 8 MG: 2 INJECTION INTRAMUSCULAR; INTRAVENOUS at 21:41

## 2018-01-01 RX ADMIN — ONDANSETRON 4 MG: 2 INJECTION INTRAMUSCULAR; INTRAVENOUS at 17:38

## 2018-01-01 RX ADMIN — METHADONE HYDROCHLORIDE 15 MG: 10 CONCENTRATE ORAL at 21:27

## 2018-01-01 RX ADMIN — LORAZEPAM 4 MG: 2 INJECTION INTRAMUSCULAR; INTRAVENOUS at 18:11

## 2018-01-01 RX ADMIN — GLYCOPYRROLATE 0.2 MG: 0.2 INJECTION INTRAMUSCULAR; INTRAVENOUS at 06:14

## 2018-01-01 RX ADMIN — LORAZEPAM 4 MG: 2 INJECTION INTRAMUSCULAR; INTRAVENOUS at 22:55

## 2018-01-01 RX ADMIN — FENTANYL CITRATE 200 MCG: 50 INJECTION, SOLUTION INTRAMUSCULAR; INTRAVENOUS at 05:58

## 2018-01-01 RX ADMIN — FENTANYL CITRATE 200 MCG: 50 INJECTION, SOLUTION INTRAMUSCULAR; INTRAVENOUS at 09:07

## 2018-01-01 RX ADMIN — FENTANYL CITRATE 100 MCG: 50 INJECTION, SOLUTION INTRAMUSCULAR; INTRAVENOUS at 22:41

## 2018-01-01 RX ADMIN — LORAZEPAM 1 MG: 2 INJECTION INTRAMUSCULAR; INTRAVENOUS at 02:42

## 2018-01-01 RX ADMIN — FENTANYL CITRATE 200 MCG: 50 INJECTION, SOLUTION INTRAMUSCULAR; INTRAVENOUS at 01:45

## 2018-01-01 RX ADMIN — LORAZEPAM 0.5 MG: 2 INJECTION INTRAMUSCULAR; INTRAVENOUS at 05:46

## 2018-01-01 RX ADMIN — FENTANYL CITRATE 200 MCG: 50 INJECTION, SOLUTION INTRAMUSCULAR; INTRAVENOUS at 19:57

## 2018-01-01 RX ADMIN — FENTANYL CITRATE 200 MCG: 50 INJECTION, SOLUTION INTRAMUSCULAR; INTRAVENOUS at 06:24

## 2018-01-01 RX ADMIN — LORAZEPAM 4 MG: 2 INJECTION INTRAMUSCULAR; INTRAVENOUS at 07:00

## 2018-01-01 RX ADMIN — LORAZEPAM 1 MG: 2 INJECTION INTRAMUSCULAR; INTRAVENOUS at 10:10

## 2018-01-01 RX ADMIN — PHENOBARBITAL SODIUM 65 MG: 65 INJECTION INTRAMUSCULAR; INTRAVENOUS at 00:55

## 2018-01-01 RX ADMIN — MIDAZOLAM HYDROCHLORIDE 5 MG: 1 INJECTION, SOLUTION INTRAMUSCULAR; INTRAVENOUS at 15:00

## 2018-01-01 RX ADMIN — LORAZEPAM 0.5 MG: 2 INJECTION INTRAMUSCULAR; INTRAVENOUS at 17:40

## 2018-01-01 RX ADMIN — FENTANYL CITRATE 200 MCG: 50 INJECTION, SOLUTION INTRAMUSCULAR; INTRAVENOUS at 23:02

## 2018-01-01 RX ADMIN — ONDANSETRON 4 MG: 2 INJECTION INTRAMUSCULAR; INTRAVENOUS at 11:21

## 2018-01-01 RX ADMIN — LORAZEPAM 2 MG: 2 INJECTION INTRAMUSCULAR; INTRAVENOUS at 21:05

## 2018-01-01 RX ADMIN — LORAZEPAM 1 MG: 2 INJECTION INTRAMUSCULAR; INTRAVENOUS at 06:05

## 2018-01-01 RX ADMIN — ONDANSETRON 4 MG: 2 INJECTION INTRAMUSCULAR; INTRAVENOUS at 17:41

## 2018-01-01 RX ADMIN — LORAZEPAM 1 MG: 2 INJECTION INTRAMUSCULAR; INTRAVENOUS at 21:06

## 2018-01-01 RX ADMIN — HYDROMORPHONE HYDROCHLORIDE 3 MG: 2 INJECTION INTRAMUSCULAR; INTRAVENOUS; SUBCUTANEOUS at 14:02

## 2018-01-01 RX ADMIN — ONDANSETRON 4 MG: 2 INJECTION INTRAMUSCULAR; INTRAVENOUS at 14:40

## 2018-01-01 RX ADMIN — ONDANSETRON 4 MG: 2 INJECTION INTRAMUSCULAR; INTRAVENOUS at 00:15

## 2018-01-01 RX ADMIN — FENTANYL CITRATE 200 MCG: 50 INJECTION, SOLUTION INTRAMUSCULAR; INTRAVENOUS at 09:10

## 2018-01-01 RX ADMIN — FENTANYL CITRATE 100 MCG: 50 INJECTION, SOLUTION INTRAMUSCULAR; INTRAVENOUS at 16:28

## 2018-01-01 RX ADMIN — FENTANYL CITRATE 200 MCG: 50 INJECTION, SOLUTION INTRAMUSCULAR; INTRAVENOUS at 16:05

## 2018-01-01 RX ADMIN — FENTANYL CITRATE 200 MCG: 50 INJECTION, SOLUTION INTRAMUSCULAR; INTRAVENOUS at 05:35

## 2018-01-01 RX ADMIN — LORAZEPAM 4 MG: 2 INJECTION INTRAMUSCULAR; INTRAVENOUS at 03:01

## 2018-01-01 RX ADMIN — FENTANYL CITRATE 100 MCG: 50 INJECTION, SOLUTION INTRAMUSCULAR; INTRAVENOUS at 07:54

## 2018-01-01 RX ADMIN — LORAZEPAM 0.5 MG: 2 INJECTION INTRAMUSCULAR; INTRAVENOUS at 12:22

## 2018-01-01 RX ADMIN — GLYCOPYRROLATE 0.2 MG: 0.2 INJECTION INTRAMUSCULAR; INTRAVENOUS at 03:10

## 2018-01-01 RX ADMIN — FENTANYL CITRATE 200 MCG: 50 INJECTION, SOLUTION INTRAMUSCULAR; INTRAVENOUS at 23:56

## 2018-01-01 RX ADMIN — LORAZEPAM 1 MG: 2 INJECTION INTRAMUSCULAR; INTRAVENOUS at 10:25

## 2018-01-01 RX ADMIN — GLYCOPYRROLATE 0.2 MG: 0.2 INJECTION INTRAMUSCULAR; INTRAVENOUS at 17:01

## 2018-01-01 RX ADMIN — PANTOPRAZOLE SODIUM 40 MG: 20 TABLET, DELAYED RELEASE ORAL at 07:57

## 2018-01-01 RX ADMIN — STANDARDIZED SENNA CONCENTRATE AND DOCUSATE SODIUM 2 TABLET: 8.6; 5 TABLET, FILM COATED ORAL at 09:57

## 2018-01-01 RX ADMIN — GLYCOPYRROLATE 0.2 MG: 0.2 INJECTION INTRAMUSCULAR; INTRAVENOUS at 22:55

## 2018-01-01 RX ADMIN — FENTANYL CITRATE 200 MCG: 50 INJECTION, SOLUTION INTRAMUSCULAR; INTRAVENOUS at 18:51

## 2018-01-01 RX ADMIN — FENTANYL CITRATE 200 MCG: 50 INJECTION, SOLUTION INTRAMUSCULAR; INTRAVENOUS at 21:29

## 2018-01-01 RX ADMIN — ONDANSETRON 4 MG: 2 INJECTION INTRAMUSCULAR; INTRAVENOUS at 18:37

## 2018-01-01 RX ADMIN — LORAZEPAM 1 MG: 2 INJECTION INTRAMUSCULAR; INTRAVENOUS at 03:22

## 2018-01-01 RX ADMIN — MORPHINE SULFATE 5 MG: 10 INJECTION INTRAVENOUS at 21:01

## 2018-01-01 RX ADMIN — ONDANSETRON 4 MG: 2 INJECTION INTRAMUSCULAR; INTRAVENOUS at 05:47

## 2018-01-01 RX ADMIN — LORAZEPAM 1 MG: 2 INJECTION INTRAMUSCULAR; INTRAVENOUS at 05:30

## 2018-01-01 RX ADMIN — STANDARDIZED SENNA CONCENTRATE AND DOCUSATE SODIUM 2 TABLET: 8.6; 5 TABLET, FILM COATED ORAL at 21:08

## 2018-01-01 RX ADMIN — LORAZEPAM 1 MG: 2 INJECTION INTRAMUSCULAR; INTRAVENOUS at 03:20

## 2018-01-01 RX ADMIN — LORAZEPAM 4 MG: 2 INJECTION INTRAMUSCULAR; INTRAVENOUS at 01:28

## 2018-01-01 RX ADMIN — FENTANYL CITRATE 200 MCG: 50 INJECTION, SOLUTION INTRAMUSCULAR; INTRAVENOUS at 06:20

## 2018-01-01 RX ADMIN — METHADONE HYDROCHLORIDE 5 MG: 10 CONCENTRATE ORAL at 05:46

## 2018-01-01 RX ADMIN — LORAZEPAM 0.5 MG: 2 INJECTION INTRAMUSCULAR; INTRAVENOUS at 23:55

## 2018-01-01 RX ADMIN — FENTANYL CITRATE 200 MCG: 50 INJECTION, SOLUTION INTRAMUSCULAR; INTRAVENOUS at 22:27

## 2018-01-01 RX ADMIN — FENTANYL CITRATE 100 MCG: 50 INJECTION, SOLUTION INTRAMUSCULAR; INTRAVENOUS at 03:22

## 2018-01-01 RX ADMIN — ONDANSETRON 8 MG: 2 INJECTION INTRAMUSCULAR; INTRAVENOUS at 08:09

## 2018-01-01 RX ADMIN — FENTANYL CITRATE 200 MCG: 50 INJECTION, SOLUTION INTRAMUSCULAR; INTRAVENOUS at 07:56

## 2018-01-01 RX ADMIN — ONDANSETRON 4 MG: 2 INJECTION INTRAMUSCULAR; INTRAVENOUS at 05:29

## 2018-01-01 RX ADMIN — PHENOBARBITAL SODIUM 65 MG: 65 INJECTION INTRAMUSCULAR; INTRAVENOUS at 19:03

## 2018-01-01 RX ADMIN — FENTANYL CITRATE 100 MCG: 50 INJECTION, SOLUTION INTRAMUSCULAR; INTRAVENOUS at 12:12

## 2018-01-01 RX ADMIN — LORAZEPAM 1 MG: 2 INJECTION INTRAMUSCULAR; INTRAVENOUS at 10:34

## 2018-01-01 RX ADMIN — MIDAZOLAM HYDROCHLORIDE 5 MG: 5 INJECTION INTRAMUSCULAR; INTRAVENOUS at 15:46

## 2018-01-01 RX ADMIN — DEXAMETHASONE SODIUM PHOSPHATE 4 MG: 4 INJECTION, SOLUTION INTRAMUSCULAR; INTRAVENOUS at 18:12

## 2018-01-01 RX ADMIN — FENTANYL CITRATE 100 MCG: 50 INJECTION, SOLUTION INTRAMUSCULAR; INTRAVENOUS at 00:15

## 2018-01-01 RX ADMIN — FENTANYL CITRATE 200 MCG: 50 INJECTION, SOLUTION INTRAMUSCULAR; INTRAVENOUS at 20:28

## 2018-01-01 RX ADMIN — MIDAZOLAM HYDROCHLORIDE 5 MG: 1 INJECTION, SOLUTION INTRAMUSCULAR; INTRAVENOUS at 13:00

## 2018-01-01 RX ADMIN — PHENOBARBITAL SODIUM 65 MG: 65 INJECTION INTRAMUSCULAR; INTRAVENOUS at 11:44

## 2018-01-01 RX ADMIN — FENTANYL CITRATE 100 MCG: 50 INJECTION, SOLUTION INTRAMUSCULAR; INTRAVENOUS at 20:05

## 2018-01-01 RX ADMIN — LORAZEPAM 1 MG: 2 INJECTION INTRAMUSCULAR; INTRAVENOUS at 19:13

## 2018-01-01 RX ADMIN — ONDANSETRON 4 MG: 2 INJECTION INTRAMUSCULAR; INTRAVENOUS at 05:41

## 2018-01-01 RX ADMIN — METHADONE HYDROCHLORIDE 5 MG: 10 CONCENTRATE ORAL at 15:29

## 2018-01-01 RX ADMIN — METHADONE HYDROCHLORIDE 15 MG: 10 CONCENTRATE ORAL at 14:15

## 2018-01-01 RX ADMIN — PHENOBARBITAL SODIUM 65 MG: 65 INJECTION INTRAMUSCULAR; INTRAVENOUS at 17:00

## 2018-01-01 RX ADMIN — FENTANYL CITRATE 100 MCG: 50 INJECTION, SOLUTION INTRAMUSCULAR; INTRAVENOUS at 23:55

## 2018-01-01 RX ADMIN — MIDAZOLAM HYDROCHLORIDE 5 MG: 5 INJECTION, SOLUTION INTRAMUSCULAR; INTRAVENOUS at 03:09

## 2018-01-01 RX ADMIN — HYDROMORPHONE HYDROCHLORIDE 2 MG: 2 INJECTION INTRAMUSCULAR; INTRAVENOUS; SUBCUTANEOUS at 13:57

## 2018-01-01 RX ADMIN — PHENOBARBITAL SODIUM 65 MG: 65 INJECTION INTRAMUSCULAR; INTRAVENOUS at 07:00

## 2018-01-15 NOTE — PROGRESS NOTES
[facility]  Nursing Note  [phone]  Fax [phone]     Name:  Lisa Maria  YOB: 1960     Dr. Anali Garcia received notification that Mr. Delon Hernandes had been hospitalized at a [facility] recently. Nurse called pt's wife Gricelda Dominguez (listed on Permission to Release PHI Form) to check on pt. Wife says pt had his nephrostomy tubes replaced last Tuesday, Jan. 9th. She says he had to go off of Eliquis 2 days prior to the procedure. Pt experienced a pulmonary embolism post procedure and was hospitalized until Saturday 9/13/18. Wife says pt is feeling better now. Wife says she was going to call our office this afternoon to cancel Mr. Severa Bitter appt with Dr. Anali Garcia on 1/17/18. Pt hasn't had chemo in 6 weeks and they rescheduled chemo appt for 1/17/18. Wife says she will call us back when she gets her work schedule to reschedule pt's appt with Dr. Anali Garcia in outpatient Palliative Medicine clinic. She voiced appreciation for the call.     Marcio Thomas, MARINAN, RN  Clinical Referral Navigator

## 2018-01-23 NOTE — TELEPHONE ENCOUNTER
Called patient to advise/confirm upcoming appt with Dr. Barrington Casey on 01/24/18 2:30pm left voicemail Also advised to please bring in patients photo ID/[employer] card and any current pain medication patient is taking to bring in the container with the medication in it.

## 2018-01-24 NOTE — PROGRESS NOTES
Palliative Medicine  Nursing Note  [phone]  Fax [phone]        Clinic Office Visit  Patient Name: Yossi Eason  YOB: 1960    1/24/2018      Advance Care Planning 11/8/2017   Patient's Healthcare Decision Maker is: Verbal statement (Legal Next of Kin remains as decision maker)   Primary Decision Maker Name Storm Blevins   Primary Decision Maker Phone Number [phone]   Primary Decision Maker Relationship to Patient Spouse         Call place to Ms. Ward Webster to check in on Mr. Ward Webster since he was not feeling well enough to come for his appointment today. She states that since his last chemo treatment he is having a lot of painful stomach cramping. He was having diarrhea which has gotten better but the cramping is still present. He has been taking Bentyl for the cramps but has gotten very little relief. For this issue she was directed to call the Oncologist, she was agreeable to do this. She also had general questions about the course he was on physically stating \"he seems to have one thing after another happen to him when it comes to his treatment\", she was asking if this was normal.  It was explained to her that chemotherapy can and does cause many different side effects and that what he was experiencing was not unusual but acknowledge that it is scary and difficult to watch a loved one go through what he is going through. It was also explained that in this situation this is symptom management that goes along with the side effects and PM, along with his oncologist want to be able to help control them. . Emotional support was provided to her and she was encouraged to call PM whenever she has questions. She was appreciative of the PM calling to speak with her and check on her . 1627-Mr. Villanueva's wife called back to let PM know that she called oncology and they want to see him in the office tomorrow.   Mr. Ward Webster expressed to her that he would try but that he did not want to go because he feels too badly. She was asking if someone could come to her home to see him. Because this is most likely a issue related to his cancer and chemotherapy treatments she was told that he should be seen by oncology. Once she thought about it and she and the PM nurse talked it through, she was in agreement. She stated she would do her best to get him there in the morning. If he is unable to make it to the appointment, she will call and notified PM.  She was told that Pm would also be checking in with her later in the day tomorrow to see how he is doing.       MARINA BrownN, RN, OCN, [facility]  Palliative Medicine

## 2018-01-24 NOTE — TELEPHONE ENCOUNTER
Left message for Ms. Jaya Short to let her know that if there was something that the PM nurses could help her with in regards to her  not feeling well today to please give us a call back.

## 2018-01-25 NOTE — PROGRESS NOTES
Palliative Medicine  Nursing Note  [phone]  Fax [phone]        Clinic Office Visit  Patient Name: Fer Omer  YOB: 1960    1/25/2018      Advance Care Planning 11/8/2017   Patient's Healthcare Decision Maker is: Verbal statement (Legal Next of Kin remains as decision maker)   Primary Decision Maker Name Arti Ackerman   Primary Decision Maker Phone Number [phone]   Primary Decision Maker Relationship to Patient Spouse       Spoke with Ms. Tonie Sam this morning. She said that Mr. Tonie Sam is feeling better this morning but wants to stay home this morning and not go to the oncologist.  He told her he thinks he has turned a corner, the cramping was not nearly as bad during the night as it has been. She explained that Dr. Osvaldo Mayo wants to see him on a home visit since he is not feeling well and Ms. Tonie Sam is  agreeable to a home visit. She was informed that Dr. Osvaldo Mayo would come out this afternoon around 1 or so. Ms Tonie Sam reached out to PM nurse again and in speaking with Ms. Tonie Sam again, she asked that Dr. Osvaldo Mayo be told that while she feels that Mari Walker maybe taking a turn toward a worsening outcome, he believes this is all chemotherapy related and he is going to get better. She wanted me to ask that during the home visit that worse case scenarios not be brought up and options as hospice( although she did not use the word herself)not be mentioned to him. She feels he is not ready for that conversation at this point. It was explained to her that Dr. Osvaldo Mayo will be honest with him while taking her request to heart, and that Dr. Osvaldo Mayo has a way of sharing options that comfort the patient. Even with this explanation, she just feels that today is not the right day. This was shared with Dr. Osvaldo Mayo via e-mail. A 1:30 appointment time was confirmed with MsAlcira Tonie Sam and Dr. Osvaldo Mayo and Marlena Roe RN will go to the house to see Mr. and Ms. Tonie Sam.         Michelle Melgar, MARINAN, RN, OCN, [facility]

## 2018-01-25 NOTE — MR AVS SNAPSHOT
[address] [place] [address] 
[phone] Patient: Chester Garsia MRN: [id number] OAB:[birth year] Visit Information Date & Time Provider Department Dept. Phone Encounter #  
 1/25/2018  1:00 PM Judd Ceja Ekaterina COLEY [id number] Follow-up Instructions Return in about 2 weeks (around 2/8/2018). Follow-up and Disposition History Your Appointments 2/14/2018  1:30 PM  
Follow Up with Edward Manuel MD  
[facility]) Appt Note: follow up for symptom mangement pb 0.00 cp 0.00 01/24/18 R/S  prc  
 [address] [facility] [address] [facility] [address]  
[phone]  
  
   
 [address] [facility] [address] [facility] [address] Upcoming Health Maintenance Date Due Hepatitis C Screening 1960 DTaP/Tdap/Td series (1 - Tdap) 5/10/1980 FOBT Q 1 YEAR AGE 50-75 5/10/2009 Pneumococcal 19-64 Highest Risk (1 of 3 - PCV13) 11/14/2018* Influenza Age 5 to Adult 11/14/2018* *Topic was postponed. The date shown is not the original due date. Allergies as of 1/25/2018  Review Complete On: 1/25/2018 By: Edward Manuel MD  
  
 Severity Noted Reaction Type Reactions Levaquin [Levofloxacin]  04/08/2011    Nausea and Vomiting Xanax [Alprazolam] Low 01/25/2018   Side Effect Other (comments) Possible delirium Current Immunizations  Never Reviewed No immunizations on file. Not reviewed this visit You Were Diagnosed With   
  
 Codes Comments RPF (retroperitoneal fibrosis)    -  Primary ICD-10-CM: N13.5 ICD-9-CM: 593.4 Malignant neoplasm of lower third of esophagus (HCC)     ICD-10-CM: C15.5 ICD-9-CM: 150.5 Cancer associated pain     ICD-10-CM: G89.3 ICD-9-CM: 338.3 Lower extremity edema     ICD-10-CM: R60.0 ICD-9-CM: 425. 3 Weakness     ICD-10-CM: R53.1 ICD-9-CM: 780.79   
 Acute deep vein thrombosis (DVT) of proximal vein of both lower extremities (HCC)     ICD-10-CM: I82.4Y3 ICD-9-CM: 453.41 Vitals BP Pulse Temp Resp Smoking Status 150/76 82 97.5 °F (36.4 °C) 16 Never Smoker Your Updated Medication List  
  
   
This list is accurate as of: 1/25/18 11:59 PM.  Always use your most recent med list.  
  
  
  
  
 dicyclomine 10 mg capsule Commonly known as:  BENTYL Take 1 Cap by mouth every four (4) hours as needed. ELIQUIS 5 mg tablet Generic drug:  apixaban Take 1 Tab by mouth two (2) times a day. loperamide 2 mg tablet Commonly known as:  IMMODIUM Take 1 Tab by mouth four (4) times daily as needed for Diarrhea for up to 10 days. * oxyCODONE ER 80 mg ER tablet Commonly known as:  OxyCONTIN Take 1 Tab by mouth every eight (8) hours. * oxyCODONE ER 20 mg ER tablet Commonly known as:  OxyCONTIN Take 1 Tab by mouth every eight (8) hours. * oxyCODONE IR 30 mg immediate release tablet Commonly known as:  Mae Loop Take 1 Tab by mouth every four (4) hours as needed. senna-docusate 8.6-50 mg per tablet Commonly known as:  Juli Caballero Take 2 Tabs by mouth two (2) times a day for 120 days. * Notice: This list has 3 medication(s) that are the same as other medications prescribed for you. Read the directions carefully, and ask your doctor or other care provider to review them with you. Follow-up Instructions Return in about 2 weeks (around 2/8/2018). Patient Instructions Dear Emma Jerome , It was a pleasure seeing you at home today. We deneen your blood today including CBC, Comp Metabolic, Q39/GGMCBE, Vitamin D, Mag, and Phos and will let you and Dr. Doyle Siddiqi know the results as soon as we have them. It was really hard for you this past week and you were unable to get to the Oncologist as planned today.  We talked about getting fluids and how this actually might have increased your swelling significantly. Everything is a delicate balance with your situation. This is what we talked about: 1. 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 
-You see Dr. Petty Fernandez at [facility] had good news of with the results of the CT scan with a decrease in size of the liver lesions and lower tumor markers 
-You restarted chemotherapy on 1/17/18 and this really affected you greatly 
-You were unable to get out of bed; had a lot of abdominal cramping 
-You would get up, change the bags and wait for the cramping to relieve but then the cycle started again 
-You are taking Loperamide and Bentyl; you are not sure if either of these help 
-You haven't used the opioid medication in particular with the cramping though the Oxycontin 100mg every 8 hours and Oxycodone IR 30mg as needed works well for your overall pain 
 
-Your next chemotherapy is 1/30/18 
-We talked about working with the symptoms you have more aggressively to help you after the chemotherapy 
 
-Our options include 1. Try Ketamine 2. Try a fast acting Fentany to manage the cramps when they come on 
3. Use IV patient controlled analgesia for the week after chemotherapy 
 
-I am going to reach out to the pharmacist to see about any interactions with Ketamine from your chemotherapy medications as I would start with this drug as it has the greatest likelihood of affecting the rectal pressure/pain you feel which is the most troublesome symptom 
-These these options, I talked about now managing your pain medication so we can streamline the use of any new medications and adjustments that may need to be made. 
-I have a call into discuss with Dr. Stefano Sanches at [facility] 3.  Edema/swelling 
-The swelling is perplexing 
-Now it is more in the right leg and scrotum more than the left leg 
-The US did not show a clot in the right leg, but did in the left upper blood vessels of the legs 
-You now have the machine that can help the swelling, it was set up yesterday in the home 
-You are not sure if there was examination of your abdominal blood vessels; I wlll get the records and talk with Dr. Angel Woods about this 
-You are not weighing yourself but I think this would help you follow your condition more closely 
-You are getting a new bed soon that adjusts and we talked about positioning the pelvis and scrotum to minimize where the fluid goes 4. Advance Care Planning 
-At this time you want to continue with the current treatment approach 
-We will talk more about this in an upcoming visit This is what you have shared with us about Advance Care Planning Advance Care Planning 11/8/2017 Patient's Healthcare Decision Maker is: Verbal statement (Legal Next of Kin remains as decision maker) Primary Decision Maker Name Vivian Saldivar Primary Decision Maker Phone Number [phone] Primary Decision Maker Relationship to Patient Spouse The Palliative Medicine Team is here to support you and your family. We will see you again in a few weeks - we discussed how important it is to call us if you are not feeling well after chemo so we can make adjustments If there are any concerns before that time, such as medication questions, worsening symptoms or a need to see a physician for an urgent or emergent situation; please call [phone] (COPE). A physician is also on call after our normal business hours of 8am to 5pm.  
 
In order to serve you better, please allow up to 48 hours for prescription refills to be processed. Certain medications may require more paperwork or a written prescription that you may need to  from the office. We appreciate you letting us know of any refill requests as soon as possible. We also would like you to sign up for [facility] as well. Sincerely, Desire Robins MD and the Palliative Medicine Team 
 
 
 
 Ketamine (By injection) Ketamine (ESCOBAR-ta-sneha) Given before surgery or a medical procedure. Brand Name(s): Ketalar There may be other brand names for this medicine. When This Medicine Should Not Be Used: This medicine is not right for everyone. You should not receive this medicine if you had an allergic reaction to ketamine. How to Use This Medicine:  
Injectable · A nurse or other health provider will give you this medicine. It is given as a shot into a muscle or vein. Drugs and Foods to Avoid: Ask your doctor or pharmacist before using any other medicine, including over-the-counter medicines, vitamins, and herbal products. · Some medicines and foods can affect how ketamine works. Tell your doctor if you are using narcotic pain medicine or a barbiturate. Warnings While Using This Medicine: · Make sure your doctor knows if you are pregnant or breastfeeding, or if you have heart failure, heart disease, high blood pressure, breathing problems such as asthma, or increased spinal fluid pressure. Tell your doctor if you have a history of drug or alcohol abuse, or if you drink alcohol daily. · This medicine may make you dizzy, drowsy, or confused for several hours. If you had outpatient surgery, you will need someone to drive you home. Wait at least 24 hours after you receive this medicine before you drive, use machines, or do anything else that could be dangerous. · This medicine may cause you to have unusual thoughts or behaviors after your surgery or procedure. You might feel confused or excited, or you might see or hear things that are not really there. Call your doctor if these thoughts or behaviors are severe or last longer than 24 hours. Possible Side Effects While Using This Medicine:  
Call your doctor right away if you notice any of these side effects: · Allergic reaction: Itching or hives, swelling in your face or hands, swelling or tingling in your mouth or throat, chest tightness, trouble breathing · Bloody or cloudy urine, difficult or painful urination · Fast, slow, or uneven heartbeat · Lightheadedness, dizziness, or fainting · Muscle stiffness, muscle spasm, or seizure · Unusual thoughts or behavior, severe confusion, dreaming while awake, or seeing, hearing, or feeling things that are not there If you notice these less serious side effects, talk with your doctor: · Double vision or uncontrolled eye movements · Nausea, vomiting, or loss of appetite · Redness, pain, or blistering where the shot was given If you notice other side effects that you think are caused by this medicine, tell your doctor. Call your doctor for medical advice about side effects. You may report side effects to FDA at 7-292-FDA-8498 © 2017 [address]  Information is for End User's use only and may not be sold, redistributed or otherwise used for commercial purposes. The above information is an  only. It is not intended as medical advice for individual conditions or treatments. Talk to your doctor, nurse or pharmacist before following any medical regimen to see if it is safe and effective for you. Patient Instructions History Introducing [facility]! Daniel Rivera introduces [employer] patient portal. Now you can access parts of your medical record, email your doctor's office, and request medication refills online. 1. In your internet browser, go to [email or web address] 2. Click on the First Time User? Click Here link in the Sign In box. You will see the New Member Sign Up page. 3. Enter your [employer] Access Code exactly as it appears below. You will not need to use this code after youve completed the sign-up process. If you do not sign up before the expiration date, you must request a new code. · [employer] Access Code: [email or web address] Expires: 2/6/2018 12:59 PM 
 
 4. Enter the last four digits of your Social Security Number (xxxx) and Date of Birth (mm/dd/yyyy) as indicated and click Submit. You will be taken to the next sign-up page. 5. Create a [employer] ID. This will be your [employer] login ID and cannot be changed, so think of one that is secure and easy to remember. 6. Create a [employer] password. You can change your password at any time. 7. Enter your Password Reset Question and Answer. This can be used at a later time if you forget your password. 8. Enter your e-mail address. You will receive e-mail notification when new information is available in [address] 9. Click Sign Up. You can now view and download portions of your medical record. 10. Click the Download Summary menu link to download a portable copy of your medical information. If you have questions, please visit the Frequently Asked Questions section of the [employer] website. Remember, [employer] is NOT to be used for urgent needs. For medical emergencies, dial 911. Now available from your iPhone and Android! Please provide this summary of care documentation to your next provider. Your primary care clinician is listed as Ghulam Rai. If you have any questions after today's visit, please call [phone].

## 2018-01-25 NOTE — PROGRESS NOTES
Palliative Medicine Outpatient Services  [place]: [phone] ([phone])    Patient Name: Mihir Arriaga  YOB: 1960    Date of Current Visit: 01/25/18  Location of Current Visit:    [x] HOME    Date of Initial Visit: 11/8/17  Requesting Physician: Dr. Wanda Kiran  Primary Care Physician: Monique Acosta MD      SUMMARY:   Mihir Arriaga is a 61y.o. year old with a past history of polyfibrosis syndrome (see below; contractures, keloids, retroperitoneal fibrosis) and esophageal cancer with liver mets diagnosed in 2016 now s/p bilateral nephrostomy tubes and ileostomy as well as DVT now with extensive lower extremity swelling, who was referred to Palliative Medicine by David Narayanan for unmet palliative care needs. The patients social history includes (see below). Palliative Medicine is addressing the following current patient/family concerns: intensity of life changing illness/procedures, pain and caregiver support. I reviewed records from Dr. Wanda Kiran 1/6/18 office visit and discussed recent hospitalization in detail [facility]) and requested records; patient had Urostomy Tubes changed and had PE and left proximal DVT. PALLIATIVE DIAGNOSES:       ICD-10-CM ICD-9-CM    1. RPF (retroperitoneal fibrosis) N13.5 593.4    2. Malignant neoplasm of lower third of esophagus (HCC) C15.5 150.5    3. Cancer associated pain G89.3 338.3    4. Lower extremity edema R60.0 782.3    5. Weakness R53.1 780.79    6. Acute deep vein thrombosis (DVT) of proximal vein of both lower extremities [facility]) I82.4Y3 453.41       PLAN:     Patient Instructions     Dear Mihir Arriaga ,    It was a pleasure seeing you at home today. We deneen your blood today including CBC, Comp Metabolic, A16/OXWHYD, Vitamin D, Mag, and Phos and will let you and Dr. Wanda Kiran know the results as soon as we have them. It was really hard for you this past week and you were unable to get to the Oncologist as planned today.  We talked about getting fluids and how this actually might have increased your swelling significantly. Everything is a delicate balance with your situation. This is what we talked about:     1. 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 Merlene Giraldo at [facility] had good news of with the results of the CT scan with a decrease in size of the liver lesions and lower tumor markers  -You restarted chemotherapy on 1/17/18 and this really affected you greatly  -You were unable to get out of bed; had a lot of abdominal cramping  -You would get up, change the bags and wait for the cramping to relieve but then the cycle started again  -You are taking Loperamide and Bentyl; you are not sure if either of these help  -You haven't used the opioid medication in particular with the cramping though the Oxycontin 100mg every 8 hours and Oxycodone IR 30mg as needed works well for your overall pain    -Your next chemotherapy is 1/30/18  -We talked about working with the symptoms you have more aggressively to help you after the chemotherapy    -Our options include  1. Try Ketamine  2. Try a fast acting Fentany to manage the cramps when they come on  3. Use IV patient controlled analgesia for the week after chemotherapy    -I am going to reach out to the pharmacist to see about any interactions with Ketamine from your chemotherapy medications as I would start with this drug as it has the greatest likelihood of affecting the rectal pressure/pain you feel which is the most troublesome symptom  -These these options, I talked about now managing your pain medication so we can streamline the use of any new medications and adjustments that may need to be made.  -I have a call into discuss with Dr. Doris Kramer at [facility]    3.  Edema/swelling  -The swelling is perplexing  -Now it is more in the right leg and scrotum more than the left leg  -The US did not show a clot in the right leg, but did in the left upper blood vessels of the legs  -You now have the machine that can help the swelling, it was set up yesterday in the home  -You are not sure if there was examination of your abdominal blood vessels; I wlll get the records and talk with Dr. Sharyn Carrasco about this  -You are not weighing yourself but I think this would help you follow your condition more closely  -You are getting a new bed soon that adjusts and we talked about positioning the pelvis and scrotum to minimize where the fluid goes    4. Advance Care Planning  -At this time you want to continue with the current treatment approach  -We will talk more about this in an upcoming visit    This is what you have shared with us about [address]. Planning 11/8/2017   Patient's Healthcare Decision Maker is: Verbal statement (Legal Next of Kin remains as decision maker)   Primary Decision Maker Name Randy Grier   Primary Decision Maker Phone Number [phone]   Primary Decision Maker Relationship to Patient Spouse     The Palliative Medicine Team is here to support you and your family. We will see you again in a few weeks - we discussed how important it is to call us if you are not feeling well after chemo so we can make adjustments    If there are any concerns before that time, such as medication questions, worsening symptoms or a need to see a physician for an urgent or emergent situation; please call [phone] (COPE). A physician is also on call after our normal business hours of 8am to 5pm.     In order to serve you better, please allow up to 48 hours for prescription refills to be processed. Certain medications may require more paperwork or a written prescription that you may need to  from the office. We appreciate you letting us know of any refill requests as soon as possible. We also would like you to sign up for [employer] as well.     Sincerely,      Heidy Dickinson MD and the Palliative Medicine Team        Ketamine (By injection)   Ketamine (Shaista)  Given before surgery or a medical procedure. Brand Name(s): Ketalar   There may be other brand names for this medicine. When This Medicine Should Not Be Used: This medicine is not right for everyone. You should not receive this medicine if you had an allergic reaction to ketamine. How to Use This Medicine:   Injectable  · A nurse or other health provider will give you this medicine. It is given as a shot into a muscle or vein. Drugs and Foods to Avoid:   Ask your doctor or pharmacist before using any other medicine, including over-the-counter medicines, vitamins, and herbal products. · Some medicines and foods can affect how ketamine works. Tell your doctor if you are using narcotic pain medicine or a barbiturate. Warnings While Using This Medicine:   · Make sure your doctor knows if you are pregnant or breastfeeding, or if you have heart failure, heart disease, high blood pressure, breathing problems such as asthma, or increased spinal fluid pressure. Tell your doctor if you have a history of drug or alcohol abuse, or if you drink alcohol daily. · This medicine may make you dizzy, drowsy, or confused for several hours. If you had outpatient surgery, you will need someone to drive you home. Wait at least 24 hours after you receive this medicine before you drive, use machines, or do anything else that could be dangerous. · This medicine may cause you to have unusual thoughts or behaviors after your surgery or procedure. You might feel confused or excited, or you might see or hear things that are not really there. Call your doctor if these thoughts or behaviors are severe or last longer than 24 hours.   Possible Side Effects While Using This Medicine:   Call your doctor right away if you notice any of these side effects:  · Allergic reaction: Itching or hives, swelling in your face or hands, swelling or tingling in your mouth or throat, chest tightness, trouble breathing  · Bloody or cloudy urine, difficult or painful urination  · Fast, slow, or uneven heartbeat  · Lightheadedness, dizziness, or fainting  · Muscle stiffness, muscle spasm, or seizure  · Unusual thoughts or behavior, severe confusion, dreaming while awake, or seeing, hearing, or feeling things that are not there  If you notice these less serious side effects, talk with your doctor:   · Double vision or uncontrolled eye movements  · Nausea, vomiting, or loss of appetite  · Redness, pain, or blistering where the shot was given  If you notice other side effects that you think are caused by this medicine, tell your doctor. Call your doctor for medical advice about side effects. You may report side effects to FDA at 6-878-FDA-9462  © 2017 [address] Information is for End User's use only and may not be sold, redistributed or otherwise used for commercial purposes. The above information is an  only. It is not intended as medical advice for individual conditions or treatments. Talk to your doctor, nurse or pharmacist before following any medical regimen to see if it is safe and effective for you.            GOALS OF CARE / TREATMENT PREFERENCES:   [====Goals of Care====]  GOALS OF CARE:  Patient / health care proxy stated goals: I've been in pain for so long, I'd like to keep it manageable    TREATMENT PREFERENCES:   Code Status:  [x] Attempt Resuscitation       [] Do Not Attempt Resuscitation    Advance Care Planning:  Advance Care Planning 11/8/2017   Patient's Healthcare Decision Maker is: Verbal statement (Legal Next of Kin remains as decision maker)   Primary Decision Maker Name Barrington Craft   Primary Decision Maker Phone Number [phone]   Primary Decision Maker Relationship to Patient Spouse     The palliative care team has discussed with patient / health care proxy about goals of care / treatment preferences for patient.  [====Goals of Care====]     PRESCRIPTIONS GIVEN:     Medications Ordered Today   Medications    dicyclomine (BENTYL) 10 mg capsule     Sig: Take 1 Cap by mouth every four (4) hours as needed. Dispense:  90 Cap     Refill:  5    loperamide (IMMODIUM) 2 mg tablet     Sig: Take 1 Tab by mouth four (4) times daily as needed for Diarrhea for up to 10 days. Dispense:  90 Tab     Refill:  2       FOLLOW UP:     Future Appointments  Date Time Provider Ameya Alfonso   2018 1:30 PM Vicente Nissen, MD [address] [place] [address] IN CARE:   Patient Care Team:  Odin Melgar MD as PCP - General (Family Practice)  Antonino Ken MD as Physician (Hematology)  Vicente Nissen, MD as Physician (Palliative Medicine)    Urology: Dr. Kathrin Vaca  - about every 3 months; leaking - changed out  Renal Dr. Kirby Jeter     HISTORY:   Nursing documentation from date of visit reviewed. Reviewed patient-completed ESAS and advance care planning form.   Reviewed patient record in prescription monitoring program.    CHIEF COMPLAINT: Pain and swelling    HPI/SUBJECTIVE:    The patient is: [x] Verbal / [] Nonverbal     See summary  Patient having severe abdominal cramping  Especially after chemotherapy  Now on 5-FU and Camptosar/Irinotecan as well as Herceptin  Camptosar can cause diarrhea  You are not noticing this in your ostomy but have had severe abdominal cramping, not really allowing you to get out of bed the week after chemo  Your rectal pressure was bad as well  Your swelling has moved from the left leg to the right leg and your scrotum is swelling as well  These symptoms have been troublesome for you and weakened you  Your overall appetite and energy is very low    From Previous Visits:    Born in [facility]  2 brothers and a sister; father ; mother is 80     2 daughters; [employer] kwame; another [address] [facility] [place]  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 2009 Apr; 24(3): [phone]. 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 Its precise pathophysiology or etiology remains unclear. In addition to distinctive diverse skin manifestations, patients with polyfibromatosis have been previously reported to show erosive arthropathy with significant limitation of movement at affected joints. However, the presence of erosive polyarthropathy in polyfibromatosis has not emphasized in previous cases. Here, we report a case of polyfibromatosis syndrome combined with painless massive structural destruction of hand and foot joints, and review the characteristics of erosive arthropathy in previous cases. Metastatic esophageal cancer diagnosed 9/1/16  Had Chemotherapy and did well; responded  Dr. Jamila Neves at [facility] [place] follows him along with Dr. Deanna Bravo / Sharon Kam  May 2017 - creatinine level elevation  June 2017 - bilateral stents; right first and then left  August 2017 -stents didn't work so had nephrostomy tubes at [facility]   September 2017 -  started having bowel obstruction; small and large bowel  September 2 to [place] and then September 12 to [facility] until September 27; then 3 days stay in [facility]  They found an inflammatory process throughout pelvis  When performed ileostomy had ascites; malignant ascites  Restarted chemotherapy; 5-FU; Oxaliplatin (itching/rash);  Herceptin  They are replacing Oxaliplatin with a different medication    Life has changed dramatically  Not happy with all the tubes; not easy to do anything Pain is constant; rectal pain  Pain and discharge from the rectal area; slowly improving  Feels like has something lodged in rectum  Annoying sensation  Improved significantly; slow improvement but is improved  Was in very severe pain in rectum    He isn't where he wants to be  It is still an effort to do anything  Getting dressed with all the tubes is difficult  Can't really work in the yard  Clothes can be restrictive  Old overalls; doesn't restrict    Diagnosed with 2 DVTs; severe swelling in both legs  Currently on diuretic and Eloquis  Left knee is hard to bend; without pain and can only bend a particular amount    He has been on medications for chronic pain  But his entire life; he had had keloids that cause pain - burning and sharp severe pain  Found a doctor in [place]; chemo and steroid injections; pulse dye laser   Name Dr. Cira Catherien all the neuropathic medications  Contractures in his 35s of his hands  Surgeries on hands; Dr. Diana Soto  He has been on opioids chronically    Current pain regimen:  Oxycontin 100mg (80 + 20) every 8 hours  Oxy IR 30mg  - 5-6/day regular  Tried Xtampza ER but did not notice any improvement; went back to Oxycontin     FUNCTIONAL ASSESSMENT:     Palliative Performance Scale (PPS):  PPS: 70     PSYCHOSOCIAL/SPIRITUAL SCREENING:     Any spiritual / Jehovah's witness concerns:  [] Yes /  [x] No    Caregiver Burnout:  [] Yes /  [x] No /  [] No Caregiver Present      Anticipatory grief assessment:   [x] Normal  / [] Maladaptive       ESAS Anxiety: Anxiety: 0    ESAS Depression: Depression: 2     REVIEW OF SYSTEMS:     The following systems were [x] reviewed / [] unable to be reviewed  Systems: constitutional, ears/nose/mouth/throat, respiratory, gastrointestinal, genitourinary, musculoskeletal, integumentary, neurologic, psychiatric, endocrine. Positive findings noted below.   Modified ESAS Completed by: provider   Fatigue: 5 Drowsiness: 0 Depression: 2 Pain: 8   Anxiety: 0 Nausea: 0   Anorexia: 6 Dyspnea: 0   Best Well-Bein Constipation: No            PHYSICAL EXAM:     Wt Readings from Last 3 Encounters:   17 182 lb 9.6 oz (82.8 kg)   17 182 lb 9.6 oz (82.8 kg)   11 209 lb 14.1 oz (95.2 kg)     Blood pressure 150/76, pulse 82, temperature 97.5 °F (36.4 °C), resp. rate 16. Last bowel movement: See Nursing Note    Constitutional: alert, oriented, fatigued, has lost weight by observation though swelling high  Eyes: pupils equal, anicteric  ENMT: no nasal discharge, moist mucous membranes  Cardiovascular:   Respiratory: breathing not labored, symmetric  Gastrointestinal: soft non-tender, ileostomy right lower quadrant, bilateral nephrostomy tubes in flank region draining clear urine   Musculoskeletal: no deformity, no tenderness to palpation, edema bilateral lower extremities through to thighs (right > left)  Skin: warm, dry  Neurologic: following commands, moving all extremities, contractures of hand bilaterally (prior surgical releases)  Psychiatric: full affect, no hallucinations  Other:     HISTORY:     Past Medical History:   Diagnosis Date    Other ill-defined conditions(204.06)     polyfibromitosis      Past Surgical History:   Procedure Laterality Date    HX ORTHOPAEDIC      hand surgery      No family history on file. History reviewed, no pertinent family history. Social History   Substance Use Topics    Smoking status: Never Smoker    Smokeless tobacco: Never Used    Alcohol use No     Allergies   Allergen Reactions    Levaquin [Levofloxacin] Nausea and Vomiting    Xanax [Alprazolam] Other (comments)     Possible delirium      Current Outpatient Prescriptions   Medication Sig    dicyclomine (BENTYL) 10 mg capsule Take 1 Cap by mouth every four (4) hours as needed.  loperamide (IMMODIUM) 2 mg tablet Take 1 Tab by mouth four (4) times daily as needed for Diarrhea for up to 10 days.     senna-docusate (PERICOLACE) 8.6-50 mg per tablet Take 2 Tabs by mouth two (2) times a day for 120 days.  oxyCODONE IR (ROXICODONE) 30 mg immediate release tablet Take 1 Tab by mouth every four (4) hours as needed.  oxyCODONE ER (OXYCONTIN) 80 mg ER tablet Take 1 Tab by mouth every eight (8) hours.  oxyCODONE ER (OXYCONTIN) 20 mg ER tablet Take 1 Tab by mouth every eight (8) hours.  ELIQUIS 5 mg tablet Take 1 Tab by mouth two (2) times a day. No current facility-administered medications for this visit. LAB DATA REVIEWED:   12/13/17  See Media;  Reviewed blood work done by [employer] / Dr. Dyllan Holt and CT done by Alyse Garcia (Creat 1.63 and Liver lesions smaller on CT)       CONTROLLED SUBSTANCES SAFETY ASSESSMENT (IF ON CONTROLLED SUBSTANCES):   Not currently prescribing patient's opioid medications    Reviewed opioid safety handout:  [x] Yes   [] No  24 hour opioid dose >150mg morphine equivalent/day:  [x] Yes   [] No  Benzodiazepines:  [] Yes   [x] No  Sleep apnea:  [] Yes   [x] No  Urine Toxicology Testing within last 6 months:  [] Yes   [x] No  History of or new aberrant medication taking behaviors:  [] Yes   [x] No          Total time: 60min  Counseling / coordination time: min  > 50% counseling / coordination?:

## 2018-01-25 NOTE — PATIENT INSTRUCTIONS
Dear Desiree Pang ,    It was a pleasure seeing you at home today. We deneen your blood today including CBC, Comp Metabolic, G77/MILSDO, Vitamin D, Mag, and Phos and will let you and Dr. Azam Mullins know the results as soon as we have them. It was really hard for you this past week and you were unable to get to the Oncologist as planned today. We talked about getting fluids and how this actually might have increased your swelling significantly. Everything is a delicate balance with your situation. This is what we talked about:     1. 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 Iain Forrester at [facility] had good news of with the results of the CT scan with a decrease in size of the liver lesions and lower tumor markers  -You restarted chemotherapy on 1/17/18 and this really affected you greatly  -You were unable to get out of bed; had a lot of abdominal cramping  -You would get up, change the bags and wait for the cramping to relieve but then the cycle started again  -You are taking Loperamide and Bentyl; you are not sure if either of these help  -You haven't used the opioid medication in particular with the cramping though the Oxycontin 100mg every 8 hours and Oxycodone IR 30mg as needed works well for your overall pain    -Your next chemotherapy is 1/30/18  -We talked about working with the symptoms you have more aggressively to help you after the chemotherapy    -Our options include  1. Try Ketamine  2. Try a fast acting Fentany to manage the cramps when they come on  3.  Use IV patient controlled analgesia for the week after chemotherapy    -I am going to reach out to the pharmacist to see about any interactions with Ketamine from your chemotherapy medications as I would start with this drug as it has the greatest likelihood of affecting the rectal pressure/pain you feel which is the most troublesome symptom  -These these options, I talked about now managing your pain medication so we can streamline the use of any new medications and adjustments that may need to be made.  -I have a call into discuss with Dr. Arline Ramirez at [facility]    3. Edema/swelling  -The swelling is perplexing  -Now it is more in the right leg and scrotum more than the left leg  -The US did not show a clot in the right leg, but did in the left upper blood vessels of the legs  -You now have the machine that can help the swelling, it was set up yesterday in the home  -You are not sure if there was examination of your abdominal blood vessels; I wlll get the records and talk with Dr. Arline Ramirez about this  -You are not weighing yourself but I think this would help you follow your condition more closely  -You are getting a new bed soon that adjusts and we talked about positioning the pelvis and scrotum to minimize where the fluid goes    4. Advance Care Planning  -At this time you want to continue with the current treatment approach  -We will talk more about this in an upcoming visit    This is what you have shared with us about Alban Thomas. Planning 11/8/2017   Patient's Healthcare Decision Maker is: Verbal statement (Legal Next of Kin remains as decision maker)   Primary Decision Maker Name Luzma Orozco   Primary Decision Maker Phone Number [phone]   Primary Decision Maker Relationship to Patient Spouse     The Palliative Medicine Team is here to support you and your family. We will see you again in a few weeks - we discussed how important it is to call us if you are not feeling well after chemo so we can make adjustments    If there are any concerns before that time, such as medication questions, worsening symptoms or a need to see a physician for an urgent or emergent situation; please call [phone] (COPE). A physician is also on call after our normal business hours of 8am to 5pm.     In order to serve you better, please allow up to 48 hours for prescription refills to be processed. Certain medications may require more paperwork or a written prescription that you may need to  from the office. We appreciate you letting us know of any refill requests as soon as possible. We also would like you to sign up for [employer] as well. Sincerely,      Fabby Cho MD and the Palliative Medicine Team        Ketamine (By injection)   Ketamine (ESCOBAR-ta-sneha)  Given before surgery or a medical procedure. Brand Name(s): Ketalar   There may be other brand names for this medicine. When This Medicine Should Not Be Used: This medicine is not right for everyone. You should not receive this medicine if you had an allergic reaction to ketamine. How to Use This Medicine:   Injectable  · A nurse or other health provider will give you this medicine. It is given as a shot into a muscle or vein. Drugs and Foods to Avoid:   Ask your doctor or pharmacist before using any other medicine, including over-the-counter medicines, vitamins, and herbal products. · Some medicines and foods can affect how ketamine works. Tell your doctor if you are using narcotic pain medicine or a barbiturate. Warnings While Using This Medicine:   · Make sure your doctor knows if you are pregnant or breastfeeding, or if you have heart failure, heart disease, high blood pressure, breathing problems such as asthma, or increased spinal fluid pressure. Tell your doctor if you have a history of drug or alcohol abuse, or if you drink alcohol daily. · This medicine may make you dizzy, drowsy, or confused for several hours. If you had outpatient surgery, you will need someone to drive you home. Wait at least 24 hours after you receive this medicine before you drive, use machines, or do anything else that could be dangerous. · This medicine may cause you to have unusual thoughts or behaviors after your surgery or procedure. You might feel confused or excited, or you might see or hear things that are not really there.  Call your doctor if these thoughts or behaviors are severe or last longer than 24 hours. Possible Side Effects While Using This Medicine:   Call your doctor right away if you notice any of these side effects:  · Allergic reaction: Itching or hives, swelling in your face or hands, swelling or tingling in your mouth or throat, chest tightness, trouble breathing  · Bloody or cloudy urine, difficult or painful urination  · Fast, slow, or uneven heartbeat  · Lightheadedness, dizziness, or fainting  · Muscle stiffness, muscle spasm, or seizure  · Unusual thoughts or behavior, severe confusion, dreaming while awake, or seeing, hearing, or feeling things that are not there  If you notice these less serious side effects, talk with your doctor:   · Double vision or uncontrolled eye movements  · Nausea, vomiting, or loss of appetite  · Redness, pain, or blistering where the shot was given  If you notice other side effects that you think are caused by this medicine, tell your doctor. Call your doctor for medical advice about side effects. You may report side effects to FDA at 7-206-FDA-5595  © 2017 [address]  Information is for End User's use only and may not be sold, redistributed or otherwise used for commercial purposes. The above information is an  only. It is not intended as medical advice for individual conditions or treatments. Talk to your doctor, nurse or pharmacist before following any medical regimen to see if it is safe and effective for you.

## 2018-01-25 NOTE — PROGRESS NOTES
[facility]  Physical Therapy Lymphedema Clinic  [address], [address], [facility] [address].    Lymphedema Therapy      1/25/2018:  Jamila Coffman was not seen on this date for physical therapy for the following reasons:    [x]      Patient's wife called to cancel the visit for the following reasons: Patient feeling poorly and having stomach cramps since his last chemotherapy treatment. Patient's wife is reporting that it is difficult for him to leave the house currently. Dr. Emmie Soliz is scheduled to see him in his home today. He received his Flexitouch and his family was trained in product use by [employer]  last night and they report decreased knee swelling post product use. Patient's wife Michelle Fried was informed that if patient is able to return to the clinic we would like to see him again, but if he continues to feel so poorly we are not top priority in his care needs considered his advanced cancer. She will call to reschedule if appropriate. []      Patient missed the visit and did not call to cancel.      Drake Lynn, JACKELYNT, IRASEMA-GUSTAVO

## 2018-01-26 NOTE — ACP (ADVANCE CARE PLANNING)
In process of ACP discussions  Advance Care Planning 11/8/2017   Patient's Healthcare Decision Maker is: Verbal statement (Legal Next of Kin remains as decision maker)   Primary Decision Maker Name Norris Tobias   Primary Decision Maker Phone Number [phone]   Primary Decision Maker Relationship to Patient Spouse

## 2018-01-26 NOTE — PROGRESS NOTES
Palliative Medicine  Nursing Note  [phone]  Fax [phone]        Clinic Office Visit  Patient Name: Miguelina Bradley  YOB: 1960    1/26/2018      Advance Care Planning 11/8/2017   Patient's Healthcare Decision Maker is: Verbal statement (Legal Next of Kin remains as decision maker)   Primary Decision Maker Name Brea Oquendo   Primary Decision Maker Phone Number [phone]   Primary Decision Maker Relationship to Patient Spouse         Called lab [facility] to get the final results on Mr. Fuentes Dean lab work from yesterday. In speaking with them, PM nurse inquired about the results of the magnesium and CMP that were ordered. According to lab [facility] no orders were received for the mag and CMP. They were asked to please add it to the blood they are ready have, they said yes and they will run it. Dr. Mayur Johnson notified. The lab results that PM has received, CBC, phosphorus, B12, Vit D were faxed to [facility], attention Dr. Doris Kramer.       Katelyn Brooks, BSN, RN, OCN, [facility]  Palliative Medicine

## 2018-01-26 NOTE — PROGRESS NOTES
Pall Med - Update    Spoke with pharmacist. Only interaction is 5-FU and Ketamine go through same CYP enzyme so Ketamine dose should be lowered as it could reduce clearance of Ketamine though 5-FU not affected. Will proceed with 7 day trial of Ketamine 5mg every 6 hours orally. Sent script to pharmacy and will have RN call to verify as may have to be ordered. Patient has had trial of Ketamine before without psych reaction but still will send in Haldol in case of this reaction in the home. If tolerates, will reduce Oxycodone IR 30mg in # of pills taken/24 hours. If tolerates and effective, will then reduce Oxycontin 100mg every 8 hours to 80mg every 8 hours.

## 2018-01-26 NOTE — PROGRESS NOTES
Palliative Medicine  Nursing Note  [phone]  Fax [phone]        Clinic Office Visit  Patient Name: Mihir Arriaga  YOB: 1960    1/26/2018      Advance Care Planning 11/8/2017   Patient's Healthcare Decision Maker is: Verbal statement (Legal Next of Kin remains as decision maker)   Primary Decision Maker Name Tej Valiente   Primary Decision Maker Phone Number [phone]   Primary Decision Maker Relationship to Patient Spouse         Spoke with Ms. Ligia Savage to let her know that the Ketamine prescription needed to be called into a compounding pharmacy, [facility] She was agreeable to it being sent to to that pharmacy. When asked how Mr. Ligia Savage was doing today she stated that he was doing well this morning but that when she checked in at lunch time, he had starting have more cramping. This was distressing to her, she knows it changes frequently but just wants he to feel better so badly. She asked if the directions would be on the prescription for the Ketamine, this was confirmed but also explained to her how the medication should be taken, Ketamine 1 ml every 6 hours on a schedule. She also wanted to know if he could take the oxycodone at the same time as the Ketamine. She was told that it was okay to take at the same time but that staggering them may give him more around the clock coverage for his pain. She was also instructed on the Haldol prescription that was called into the pharmacy. She was told that if he were to have any adverse reactions to the Ketamine she should give him a dose of the Haldol 2mg/ml and to hold the next dose of Ketamine. She was aware of what some of the reactions could be, Pm nurse and her discussed hallucations. Agitation, and confusion.   She was also told that she should call the on-call physician over the weekend for anything that is going on with Mr. Ligia Savage that she is not comfortable with, if she feels his pain is worse or if he starts having adverse reaction to the Ketamine. She was told that information about what is going on with her  will be passed on to the person on-call for PM.  She was glad to know that and agreed to call if any issues arise.          MARINA AyonN, RN, OCN, [facility]  Palliative Medicine

## 2018-01-26 NOTE — ASSESSMENT & PLAN NOTE
Stable, based on history, physical exam and review of pertinent labs, studies and medications; meds reconciled; continue current treatment plan. Key Peripheral Vascular Disease Meds             ELIQUIS 5 mg tablet Take 1 Tab by mouth two (2) times a day.         Lab Results   Component Value Date/Time    Creatinine (POC) 2.0 07/31/2017 06:45 PM

## 2018-01-26 NOTE — ASSESSMENT & PLAN NOTE
This condition is managed by Specialist.   Noel WILDE Herceptiv Dr. Cathryne Diego, [facility]    Key Pain Meds             oxyCODONE IR (ROXICODONE) 30 mg immediate release tablet Take 1 Tab by mouth every four (4) hours as needed. oxyCODONE ER (OXYCONTIN) 80 mg ER tablet Take 1 Tab by mouth every eight (8) hours. oxyCODONE ER (OXYCONTIN) 20 mg ER tablet Take 1 Tab by mouth every eight (8) hours.         Lab Results   Component Value Date/Time    Creatinine (POC) 2.0 07/31/2017 06:45 PM

## 2018-01-27 NOTE — PROGRESS NOTES
PALLIATIVE MEDICINE        Received a call from the patient's wife Mariluz Murphy. Wife reports that the patient's pain has not improved with the addition of Ketamine 2 days ago. Pt moaning and unable to sleep. Pain is in abdomen and described as severe cramping. Taking Bentyl as prescribed. .. Also takes oxycontin 100mg q 8 with oxycodone 30mg q 4 prn. Pt has only taken 2 doses of the oxy 30mg in the past 24 hours. Labs reviewed that were drawn 1/25 and discussed with wife    Recommended pt increase Ketamine from 5mg to 10mg q 6 and to take oxycodone 30mg q 4 to see if that improves s/s. Also noted the pt has misplaced his 80mg tabs of oxycontin so he is actually taking less then usual.  Levsin may also be an alternative to Bentyl (just a thought)      Wife will call me back if no improvement          Hang HAMM  [address] MSN, FNP-BC, [facility]

## 2018-01-29 NOTE — TELEPHONE ENCOUNTER
Palliative Medicine    Spoke with wife Becki Romano this morning  Patient still had significant pain over weekend  Adjusted Ketamine from 5 to 10mg every 6 hours  Better yesterday and today  Still having rectal pressure  Not really out of bed much  Discussed overall his physical decline and also seemingly lower motivation / commitment to treatment, though he says he wants treatment, having a hard time verbalizing decisions  They have an appointment with the I NP today  Becki Romano to call her this am to update her on pain and functional status  Discussed his overall condition and progression, not necessarily of disease but of overall physical health given the challenges recently; hospitalization wit PE, change in tubes, infection etc... After chemo was significantly weaker  Noticeable change in fat distribution and muscle mass  Becki Romano is concerned; discussed that while he may improve/ stabilize it may be at a lower functional status than a few months ago  She wants to transparent with her children also so they can have opportunities to connect  Discussed help at home; they have sister and sister in law generally that help but there are times it is just her; this can be challenging  Discussed proactive support; always have a little more than you need  Will have [facility] reach out to support with resource, or Dolph Carry - even if not needed in coming weeks/months they will be prepared    Action Items  1. Increase Ketamine to 15mg every 6 hours  2. New script put into escribe but may need call in; notified nurse  3. Connect with [facility] on preparing for home resources  4. Discuss with Dr. Bonilla Bridges; have texted and called office for return call  5. Will connect with Dr. Roxie Lacey at [facility]  6. Support Becki Romano /wife in difficult situation  7.  Need results of comp metabolic not back yet

## 2018-01-29 NOTE — PROGRESS NOTES
Palliative Medicine  Nursing Note  [phone]  Fax [phone]      Telephone Call  Patient Name: Arely Burch  YOB: 1960    1/29/2018      Advance Care Planning 11/8/2017   Patient's Healthcare Decision Maker is: Verbal statement (Legal Next of Kin remains as decision maker)   Primary Decision Maker Name Sneha Richard   Primary Decision Maker Phone Number [phone]   Primary Decision Maker Relationship to Patient Spouse     Call place to Ms. Dima Morales in response to her calling the PM office. She wanted to let PM and Dr. Tigre Matthews know that Mr. Dima Morales is feeling better today, Saturday was a rough day for him but after increasing the Ketamine to 10 mg and taking Oxycodone 30 mg every 4 hours as instructed to do, Sunday and today he is better. Remains with abdominal pressure but to a lesser degree than before. She was asking if the lab results were back from what was done last week and asked that they be faxed to Dr. Lilliam Reyna office. She was told that we would send them to him once we have all of the results back. He has an appointment with the NP this afternoon and is hoping that he possibly won't have to go if the lab that was drawn will cover him. She was told that PM nurse will call Dr. Lilliam Reyna NP to discuss this and will call her back with a response. [id number]-Since beginning this note PM nurse received a call from Ms. Dima Morales stating that Mr. Dima Morales has decided to not have his chemotherapy tomorrow. He wants to wait a week to see if he can get stronger before he has his next treatment. They have called to let Dr. Lilliam Reyna office know that he wants to wait. Dr. Gregg Aquino note from her home visit on 1/25/2018 and the note from the telephone encounter with Ms. Dima Morales were faxed to Dr. Lilliam Reyna office. 1134-prescription for Ketamine 5mg/ml, take 3ml every 6 hours was called into the [facility], spoke with Darlin Seaman, pharmacist.  Dispense 180 ml with 2 refills. Ms. Carlos Ferrari was encouraged to call PM office if she had any other issues that arose with Mr. Carlos Ferrari, and she agreed to do this.         MARINA RendonN, RN, OCN, [facility]  Palliative Medicine

## 2018-01-31 NOTE — PROGRESS NOTES
Palliative Medicine  Nursing Note  [phone]  Fax [phone]        Clinic Office Visit  Patient Name: Jessi Mcintyre  YOB: 1960    1/31/2018      Advance Care Planning 11/8/2017   Patient's Healthcare Decision Maker is: Verbal statement (Legal Next of Kin remains as decision maker)   Primary Decision Maker Name Barrington Craft   Primary Decision Maker Phone Number [phone]   Primary Decision Maker Relationship to Patient Spouse     Spoke with Ms. Yuko Flaherty, she had a couple of questions. She has been in touch with Dr. Michael Hamm at [facility] who is Alcira Puja Beebe main oncologist who wondered if a CT scan should be done in the near future. He is scheduled for a scan on March 1 at [facility] would not be opposed to having one done sooner if Dr. Denise Hernandez and/or Dr. Deya Reynaga thought it was a good idea. She also wants was asking if Dr. Deya Reynaga and Dr. Denise Hernandez had spoken yet. She also states that Mr. Yuko Flaherty expressed to her that he feels really tired, feels down and has no desire to really do anything. He has not showered in a couple of days due to lack of interest.  He said to her he thinks it is the Ketamine saying \"I just don't like the way it makes me feel. \"  She states that he is not having the abdominal cramping that he was having before and he is glad about that, but stills has the pressure in the rectum and lower abdomen. He had one episode of a lot of pain that lasted about 20 minutes but that has only happened once in the past couple of days. He is schedule to see Dr. Deya Reynaga on [address]. They are going to have lab then and the hope is to have chemo that day. She was told that the PM nurse will discuss these issues with Dr. Denise Hernandez and she will get a return call this afternoon.             Juan Antonio Giraldo, MARINAN, RN, OCN, [facility]  Palliative Medicine
Normal for race

## 2018-01-31 NOTE — TELEPHONE ENCOUNTER
Storm Blevins is calling to speak to Kameron Alvarez. Has several questions regarding treatment options and concern about medication patient is on. Advised that nurse would return her call.

## 2018-02-02 NOTE — PROGRESS NOTES
Palliative Medicine  Nursing Note  [phone]  Fax [phone]        Clinic Office Visit  Patient Name: Raina Obrien  YOB: 1960    2/2/2018      Advance Care Planning 11/8/2017   Patient's Healthcare Decision Maker is: Verbal statement (Legal Next of Kin remains as decision maker)   Primary Decision Maker Name Rachel Santana   Primary Decision Maker Phone Number [phone]   Primary Decision Maker Relationship to Patient Spouse       Spoke with Demetrice Rod who states that Mega Leroy has had a much better day, has been up out of bed and downstairs a couple of times today and she just wanted us to know. He is out of oxybutine 5 mg and is requesting a refill. PM nurse will check with Pharmacy, if there are no refills PM nurse will check with Dr. Mendoza Morataya to see if we can refill the order. The pharmacy was notified and the he has refills available, and it will be filled for him. A message was left for his wife to let him know it can be refilled.   Josefa Alonzo, BSN, RN, OCN, [facility]  Palliative Medicine

## 2018-02-07 NOTE — PROGRESS NOTES
Palliative Medicine  Nursing Note  [phone]  Fax [phone]        Clinic Office Visit  Patient Name: Emma Jerome  YOB: 1960    2/7/2018      Advance Care Planning 11/8/2017   Patient's Healthcare Decision Maker is: Verbal statement (Legal Next of Kin remains as decision maker)   Primary Decision Maker Name Norris Tobias   Primary Decision Maker Phone Number [phone]   Primary Decision Maker Relationship to Patient Spouse     Spoke with Norris Tobias to see how Mr. Nimisha Ruiz was doing. He went to see Dr. Doyle Siddiqi yesterday who stated that he felt that he should have a CT scan today to see if he has disease progression before proceeding with chemo. According to Alejandro Giang he was agreeable to having the scan done but this morning he states he wants to wait a couple of days. She said that she knows he is afraid of what the scan will show, but does not want to say that to her. She is hopeful that he go ahead and get the scan today. She said that Dr. Doyle Siddiqi explained that with all of his complications it may be time to consider stopping chemo and moving toward comfort care, he took that has that he was giving up on him. Alejandro Giang was able to turn the statement around and pointed out that Dr. Jerry Rodriges the main oncologist and that once the scan is done they will reach out to her before making any decisions. That was helpful to Mr. Polk. She states that overall he is doing fairly well. He is taking the ketamine only on a prn basis but he feels like when he does it helps with the pressure in his abdomen and rectum. He still has a lot of edema in his right leg and has been seen by lymphedema clinic. He has a machine from them that is somewhat helpful. She stated that they both are anxious about the next steps but feels that with help of Dr. Jerry Rodriges, Dr. Doyle Siddiqi, Dr. Solomon Mcfadden, and PM they will get the help they need.   She will use [employer] to communicate with PM nurse later today to let us know if the CT scan was done today.           MARINA FragaN, RN, OCN, [facility]  Palliative Medicine

## 2018-02-14 NOTE — PROGRESS NOTES
Palliative Medicine  Nursing Note  [phone]  Fax [phone]        Clinic Office Visit  Patient Name: Francy Akins  YOB: 1960    2/14/2018      Advance Care Planning 11/8/2017   Patient's Healthcare Decision Maker is: Verbal statement (Legal Next of Kin remains as decision maker)   Primary Decision Maker Name Isabela Bell   Primary Decision Maker Phone Number [phone]   Primary Decision Maker Relationship to Patient Spouse       Call returned to Dolores Parada, regarding her , Mr. Farshad Snider. She voiced concern because he is having medical issues that are causing him not to be able to receive chemotherapy on the schedule that had originally be set up for him. He was unable to go last week for therapy. He was schedule to have chemo today after having a CT scan donee yesterday. The scan was not able to be done because of an elevated creatinine. He is now scheduled for a PET scan for Friday, if it is approved by his insurance. Once the scan is done Mr. Farshad Snider is schedule to see Dr. Kameron Johnson on Monday. PM nurse shared with Dolores Parada that Dr. Maryan Patterson wants to see Mr. Farshad Snider and is hoping to do a home visit on Monday. Dolores Parada shared this with Mr. Farshad Snider, he does not want to see Dr. Maryan Patterson until the test results are back from the scans. The Pm nurse explained to Dolores Parada that Palliative Medicine through Dr. Maryan Patterson can help him with his symptom management to make each day the best it can be while waiting for the test to come back. Dolores Parada understands this but says that Mr. Farshad Snider is a proud man who does not want people having to come to his when he is unable to shower and be out of bed, he wants to feels presentable and in control of his surroundings. According to her his mind is pretty well made up that he does not need to see PM until he has seen Dr. Kameron Johnson and discussed the present state of his disease process.   The [facility]'s feel that there is a good chance that he has progression and they don't really know what will happen next. His tumor marker have doubled since November. She is anxious about his condition and is not really sure what to do. She asked if it would be possible for Dr. Dimas Nugent to call and talk to Mr. Polk. She requested that the call be made as a check on him but not something that Ms. Polk suggested be done. Emotional support was provided to Ms. Polk and she was told that Dr. Dimas Nugent would be made aware of the situation.         Bonnie Guan, MARINAN, RN, OCN, [facility]  Palliative Medicine

## 2018-02-16 NOTE — TELEPHONE ENCOUNTER
Palliative Medicine    -Still having symptoms of rectal pressure  -They did not do the CT scan because of Creat at 1.8   -Without the scan they won't do the chemo  -Tumor markers have doubled  -They want to know what is going on before doing chemo  -Did a PET scan instead; doing today  -He is anxious to find out what this will show  -From there they will make a decision; meeting with Dr. Doyle Siddiqi on Monday  -He feels frustrated waiting around    For pain  -Still taking Oxycontin 80mg + 20mg and Oxycodone IR 30mg  -Took Ketamine but made him feel down  -At night, he has been taking 2ml (10mg) of the Ketamine 5mg/1ml  -He isn't sleeping that well but this hasn't changed  -He is not sure we can change things right now as much to help  -He had tried Dilaudid previously when he had his chronic pain in the past  -The abdominal cramping was for 2 weeks after the last chemo in January; this has been better  -Swelling; the right leg is better; has been doing the therapy with the home machine  -He has scripts for now - Dr. Doyle Siddiqi refilled his last pain scripts  -He should be good for another few weeks    -He is expecting there will not be good news  -Not caught up in emotions of this  -He is accepting but frustrated of course that he can't do more than he can right now (mostly in bed due to fatigue and pain)    Action  1. He has PET today, sees Dr. Doyle Siddiqi on Monday afternoon  2. He will call if symptoms escalate over weekend  3.  I will plan to see him next Friday at home as I'm out of town Wed/Thursday unless symptoms escalate then will work in Monday or Tuesday

## 2018-02-20 NOTE — MR AVS SNAPSHOT
[address] [facility] [address] [place] [address] 
[phone] Patient: Bill Maguire MRN: [id number] SHIRA:[id number] Visit Information Date & Time Provider Department Dept. Phone Encounter #  
 2/20/2018 11:30 AM Judd Marina [phone] [id number] Follow-up Instructions Return in about 2 weeks (around 3/6/2018). Follow-up and Disposition History Upcoming Health Maintenance Date Due Hepatitis C Screening 1960 DTaP/Tdap/Td series (1 - Tdap) 5/10/1980 FOBT Q 1 YEAR AGE 50-75 5/10/2009 Pneumococcal 19-64 Highest Risk (1 of 3 - PCV13) 11/14/2018* Influenza Age 5 to Adult 11/14/2018* *Topic was postponed. The date shown is not the original due date. Allergies as of 2/20/2018  Review Complete On: 2/16/2018 By: Ava Aparicio MD  
  
 Severity Noted Reaction Type Reactions Levaquin [Levofloxacin]  04/08/2011    Nausea and Vomiting Xanax [Alprazolam] Low 01/25/2018   Side Effect Other (comments) Possible delirium Current Immunizations  Never Reviewed No immunizations on file. Not reviewed this visit You Were Diagnosed With   
  
 Codes Comments Scrotal pain    -  Primary ICD-10-CM: K81.94 ICD-9-CM: 608.9 Back pain, unspecified back location, unspecified back pain laterality, unspecified chronicity     ICD-10-CM: M54.9 ICD-9-CM: 724.5 Cancer associated pain     ICD-10-CM: G89.3 ICD-9-CM: 338. 3 Acute deep vein thrombosis (DVT) of proximal vein of both lower extremities (HCC)     ICD-10-CM: I82.4Y3 ICD-9-CM: 453.41 Malignant neoplasm of esophagus, unspecified location [facility])     ICD-10-CM: C15.9 ICD-9-CM: 150.9 Vitals Smoking Status Never Smoker Preferred Pharmacy Pharmacy Name Phone [id number], [address] [phone] Your Updated Medication List  
  
   
This list is accurate as of 2/20/18 11:59 PM.  Always use your most recent med list.  
  
  
  
  
 dicyclomine 10 mg capsule Commonly known as:  BENTYL Take 1 Cap by mouth every four (4) hours as needed. ELIQUIS 5 mg tablet Generic drug:  apixaban Take 1 Tab by mouth two (2) times a day.  
  
 haloperidol 2 mg/mL oral concentrate Commonly known as:  HALDOL Take 1 mL by mouth every six (6) hours as needed. Indications: In case of Ketamine reaction  
  
 ketamine 5 mg/1 mL Soln 5 mg/mL oral solution (compounded) Commonly known as:  KETALAR Take 3 mL by mouth every six (6) hours. * oxyCODONE 60 mg Tr12 Take 120 mg by mouth every eight (8) hours for 30 days. Max Daily Amount: 360 mg. Indications: SEVERE PAIN WITH OPIOID TOLERANCE  
  
 * oxyCODONE IR 30 mg immediate release tablet Commonly known as:  Seema Furth Take 2 Tabs by mouth every four (4) hours as needed for up to 15 days. Max Daily Amount: 360 mg. Start taking on:  3/22/2018  
  
 senna-docusate 8.6-50 mg per tablet Commonly known as:  Raina Shall Take 2 Tabs by mouth two (2) times a day for 120 days. * Notice: This list has 2 medication(s) that are the same as other medications prescribed for you. Read the directions carefully, and ask your doctor or other care provider to review them with you. Prescriptions Printed Refills  
 oxyCODONE IR (ROXICODONE) 30 mg immediate release tablet (Discontinued) 0 Sig: Take 2 Tabs by mouth every four (4) hours as needed for up to 15 days. Max Daily Amount: 360 mg.  
 Class: Print Route: Oral  
 Reason for Discontinue: Reorder  
 oxyCODONE 60 mg TR12 0 Sig: Take 120 mg by mouth every eight (8) hours for 30 days. Max Daily Amount: 360 mg. Indications: SEVERE PAIN WITH OPIOID TOLERANCE Class: Print Route: Oral  
  
Follow-up Instructions Return in about 2 weeks (around 3/6/2018). Patient Instructions Dear Silvia Aleman , It was a pleasure seeing you at home today. This is what we talked about: 1. 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 
-You see Dr. Uli Yao at [facility] on March 1  
-You had a PET scan and we reviewed those results; it is inconclusive as to whether there is progression from the CT scan you had in November (hard to make that comparison 
-You are having bloodwork tomorrow including tumor markers (we discussed tumor markers a bit at today's visit) -You are restarting chemotherapy tomorrow / 2/21/18   
-We will check in on you to see how you are tolerating this over the next week 2. Pain management 
-We are going to change your pain regimen as you are running out of either the IR or the Exetended Release causing you to have to take more of either when you run out; you required a very high dose of opioid because you have been on this medication for many many years due to your underlying disease state 
 
-However, the inconsistency of the ingestion is causing erratic pain control 
 
-I reviewed your Prescription Monitoring Program and it appears you are on about an every 3 week refill instead of every 30 days 
 
-We are going to increase the Oxycontin to 60mg 2 tabs (120mg) every 8 hours for #180 per month 
-I provided a script for 7 days and a script for 30 days so the medication can be ordered 
 
-We are going to increase the number of Oxycodone IR 30mg to 2 tabs (60mg) every 4 hours as needed, each for 150 every 15 days 
-Due to the number of pills, every 15 day scripts are necessary 
-I provided 1 script and at follow up next week we will provide another 2 15-day scripts 
 
-We want to make sure you do not take more than prescribed and thus don't run out early as that will affect your pain levels 
 
-You are still using the Ketamine 2-3ml (10-15mg) at night and do not need a refill at this time -Your bowels/ostomy is moving well with the bowel regimen you are on 3. Left sided groin/scotal pain 
-We are sending a urine today, though you just finished the ciprofloxin 
-The pain you are having in the left groin and scrotum may be from the stent that is in the left ureter 
-I encouraged you to go ahead and make an appointment with Urology to assess this as it has been getting worse 
-The discomfort could also be coming from the known clot in the upper left leg blood vessels 4. Edema/swelling 
-The swelling is stable at this time 
-Now it is more in the right leg and scrotum more than the left leg 
-The US did not show a clot in the right leg, but did in the left upper blood vessels of the legs 
-You now have the machine that can help the swelling and are beginning to use this  
-You should weight yourself about 3 times a week to follow the fluid shifts 
-Your new bed is helping positioning while you are at home 5. Advance Care Planning 
-At this time you want to continue with the current treatment approach 
-We will talk more about this in an upcoming visit This is what you have shared with us about Advance Care Planning Advance Care Planning 11/8/2017 Patient's Healthcare Decision Maker is: Verbal statement (Legal Next of Kin remains as decision maker) Primary Decision Maker Name Anupama Darling Primary Decision Maker Phone Number [phone] Primary Decision Maker Relationship to Patient Spouse The Palliative Medicine Team is here to support you and your family. We will see you again in a few weeks - we discussed how important it is to call us if you are not feeling well after chemo so we can make adjustments If there are any concerns before that time, such as medication questions, worsening symptoms or a need to see a physician for an urgent or emergent situation; please call [phone] (COPE).  A physician is also on call after our normal business hours of 8am to 5pm.  
 
 In order to serve you better, please allow up to 48 hours for prescription refills to be processed. Certain medications may require more paperwork or a written prescription that you may need to  from the office. We appreciate you letting us know of any refill requests as soon as possible. We also would like you to sign up for [employer] as well. Sincerely, Park Davis MD and the Palliative Medicine Team 
 
OPIOID SAFETY HANDOUT You are being prescribed a type of pain medication called an \"opioid\". This medication can be very effective. However, if not taken correctly, it can also be dangerous. Thus, there are necessary precautions for your safety and the safety of others. Following these will allow us to continue to safely prescribe this medication for your pain. Bring your original medication bottles to EVERY visit with the medication you have left in 
them. We cannot give you a new prescription without verifying the previous prescription. Opioids can cause fatigue or sleepiness. **Do not take with other medications that make you sleepy. **Do not take with alcohol or other drugs. Opioids can cause constipation. **Take a combination stool softener/laxative while taking opioids. Only take your pain medication as prescribed on the bottle or instructions. Do not increase your pain medication dose without discussing it with your prescribing physician. If your pain becomes worse, please call Palliative Medicine at [phone]). Do not use the Emergency Department for your pain unless it is a life-threatening situation. Your pain medication was prescribed only for you. It is both unsafe and illegal to share or sell your pain medication. Do not drive within 72 hours of a change in dose or the addition of a new medication. Your pain medication should be kept in a locked container in your house. Only take out the number of pills you need for 24 hours. Keep all opioids (and other medications) away from a child's reach. If you pill bottle is lost or stolen, we typically cannot write a new prescription for the same medication until the date it was supposed to be finished. In order to keep you safe, we access the [employer]. This allows us to make sure you are not prescribed a similar medication that could interact with the opioids we have prescribed. Additionally, every patient prescribed opioids for more than 3 months will be asked to give a urine sample for drug testing at least once. This is a necessary test as guided by the [facility] for patients who are prescribed opioid medications. To dispose of your unused medications safely visit: www.fda.gov and search \"unused medications. \" Please inform Palliative Medicine of any questions you may have about your pain medications by call [phone]). We look forward to seeing you at the next office visit. Enedina Schrader MD  
 
 
 
 
 
 
 Patient Instructions History Introducing [facility]! Dear Ruben Mcginnis: Thank you for requesting a [employer] account. Our records indicate that you already have an active [employer] account. You can access your account anytime at [email or web address] Did you know that you can access your hospital and ER discharge instructions at any time in [employer]? You can also review all of your test results from your hospital stay or ER visit. Additional Information If you have questions, please visit the Frequently Asked Questions section of the [employer] website at [email or web address] Remember, [employer] is NOT to be used for urgent needs. For medical emergencies, dial 911. Now available from your iPhone and Android! Please provide this summary of care documentation to your next provider. Your primary care clinician is listed as Selma Tineo. If you have any questions after today's visit, please call [phone].

## 2018-02-20 NOTE — PROGRESS NOTES
Palliative Medicine Outpatient Services  [place]: [phone] ([phone])    Patient Name: Zahraa Allan  YOB: 1960    Date of Current Visit: 2/20/18  Location of Current Visit:    [x] HOME    Date of Initial Visit: 11/8/17  Requesting Physician: Dr. Jenni Foster  Primary Care Physician: Oneal Carter MD      SUMMARY:   Zahraa Allan is a 61y.o. year old with a past history of polyfibrosis syndrome (see below; contractures, keloids, retroperitoneal fibrosis) and esophageal cancer with liver mets diagnosed in 2016 now s/p bilateral nephrostomy tubes and ileostomy as well as DVT now with extensive lower extremity swelling, who was referred to Palliative Medicine by Michelle Crandall for unmet palliative care needs. The patients social history includes (see below). Palliative Medicine is addressing the following current patient/family concerns: intensity of life changing illness/procedures, pain and caregiver support. I reviewed PET scan from Friday 2/16/18 while in the home plus CT scan report from November 2017     PALLIATIVE DIAGNOSES:       ICD-10-CM ICD-9-CM    1. Scrotal pain N50.82 608.9 oxyCODONE IR (ROXICODONE) 30 mg immediate release tablet      oxyCODONE 60 mg TR12      DISCONTINUED: oxyCODONE IR (ROXICODONE) 30 mg immediate release tablet      DISCONTINUED: oxyCODONE IR (ROXICODONE) 30 mg immediate release tablet   2. Back pain, unspecified back location, unspecified back pain laterality, unspecified chronicity M54.9 724.5 oxyCODONE IR (ROXICODONE) 30 mg immediate release tablet      oxyCODONE 60 mg TR12      DISCONTINUED: oxyCODONE IR (ROXICODONE) 30 mg immediate release tablet      DISCONTINUED: oxyCODONE IR (ROXICODONE) 30 mg immediate release tablet      CANCELED: CULTURE, URINE      CANCELED: URINALYSIS W/MICROSCOPIC   3.  Cancer associated pain G89.3 338.3 oxyCODONE IR (ROXICODONE) 30 mg immediate release tablet      oxyCODONE 60 mg TR12      DISCONTINUED: oxyCODONE IR (ROXICODONE) 30 mg immediate release tablet      DISCONTINUED: oxyCODONE IR (ROXICODONE) 30 mg immediate release tablet   4. Acute deep vein thrombosis (DVT) of proximal vein of both lower extremities (HCC) I82.4Y3 453.41    5. Malignant neoplasm of esophagus, unspecified location [facility]) C15.9 150.9       PLAN:     Patient Instructions     Dear Drake Frazier ,    It was a pleasure seeing you at home today. This is what we talked about:     1. 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 Reynaldo Mayo at [facility] on March 1   -You had a PET scan and we reviewed those results; it is inconclusive as to whether there is progression from the CT scan you had in November (hard to make that comparison  -You are having bloodwork tomorrow including tumor markers (we discussed tumor markers a bit at today's visit)  -You are restarting chemotherapy tomorrow / 2/21/18    -We will check in on you to see how you are tolerating this over the next week    2.  Pain management  -We are going to change your pain regimen as you are running out of either the IR or the Exetended Release causing you to have to take more of either when you run out; you required a very high dose of opioid because you have been on this medication for many many years due to your underlying disease state    -However, the inconsistency of the ingestion is causing erratic pain control    -I reviewed your Prescription Monitoring Program and it appears you are on about an every 3 week refill instead of every 30 days    -We are going to increase the Oxycontin to 60mg 2 tabs (120mg) every 8 hours for #180 per month  -I provided a script for 7 days and a script for 30 days so the medication can be ordered    -We are going to increase the number of Oxycodone IR 30mg to 2 tabs (60mg) every 4 hours as needed, each for 150 every 15 days  -Due to the number of pills, every 15 day scripts are necessary  -I provided 1 script and at follow up next week we will provide another 2 15-day scripts    -We want to make sure you do not take more than prescribed and thus don't run out early as that will affect your pain levels    -You are still using the Ketamine 2-3ml (10-15mg) at night and do not need a refill at this time    -Your bowels/ostomy is moving well with the bowel regimen you are on     3. Left sided groin/scotal pain  -We are sending a urine today, though you just finished the ciprofloxin  -The pain you are having in the left groin and scrotum may be from the stent that is in the left ureter  -I encouraged you to go ahead and make an appointment with Urology to assess this as it has been getting worse  -The discomfort could also be coming from the known clot in the upper left leg blood vessels    4. Edema/swelling  -The swelling is stable at this time  -Now it is more in the right leg and scrotum more than the left leg  -The US did not show a clot in the right leg, but did in the left upper blood vessels of the legs  -You now have the machine that can help the swelling and are beginning to use this   -You should weight yourself about 3 times a week to follow the fluid shifts  -Your new bed is helping positioning while you are at home    5. Advance Care Planning  -At this time you want to continue with the current treatment approach  -We will talk more about this in an upcoming visit    This is what you have shared with us about Alban Thomas. Planning 11/8/2017   Patient's Healthcare Decision Maker is: Verbal statement (Legal Next of Kin remains as decision maker)   Primary Decision Maker Name Nic Juares   Primary Decision Maker Phone Number [phone]   Primary Decision Maker Relationship to Patient Spouse     The Palliative Medicine Team is here to support you and your family.      We will see you again in a few weeks - we discussed how important it is to call us if you are not feeling well after chemo so we can make adjustments    If there are any concerns before that time, such as medication questions, worsening symptoms or a need to see a physician for an urgent or emergent situation; please call [phone] (COPE). A physician is also on call after our normal business hours of 8am to 5pm.     In order to serve you better, please allow up to 48 hours for prescription refills to be processed. Certain medications may require more paperwork or a written prescription that you may need to  from the office. We appreciate you letting us know of any refill requests as soon as possible. We also would like you to sign up for [employer] as well. Sincerely,      Sasha Ash MD and the Palliative Medicine Team    OPIOID SAFETY HANDOUT    You are being prescribed a type of pain medication called an \"opioid\". This medication can be very effective. However, if not taken correctly, it can also be dangerous. Thus, there are necessary precautions for your safety and the safety of others. Following these will allow us to continue to safely prescribe this medication for your pain. Bring your original medication bottles to EVERY visit with the medication you have left in  them. We cannot give you a new prescription without verifying the previous prescription. Opioids can cause fatigue or sleepiness. **Do not take with other medications that make you sleepy. **Do not take with alcohol or other drugs. Opioids can cause constipation. **Take a combination stool softener/laxative while taking opioids. Only take your pain medication as prescribed on the bottle or instructions. Do not increase your pain medication dose without discussing it with your prescribing physician. If your pain becomes worse, please call Palliative Medicine at [phone]). Do not use the Emergency Department for your pain unless it is a life-threatening situation. Your pain medication was prescribed only for you.  It is both unsafe and illegal to share or sell your pain medication. Do not drive within 72 hours of a change in dose or the addition of a new medication. Your pain medication should be kept in a locked container in your house. Only take out the number of pills you need for 24 hours. Keep all opioids (and other medications) away from a child's reach. If you pill bottle is lost or stolen, we typically cannot write a new prescription for the same medication until the date it was supposed to be finished. In order to keep you safe, we access the [employer]. This allows us to make sure you are not prescribed a similar medication that could interact with the opioids we have prescribed. Additionally, every patient prescribed opioids for more than 3 months will be asked to give a urine sample for drug testing at least once. This is a necessary test as guided by the [facility] for patients who are prescribed opioid medications. To dispose of your unused medications safely visit: www.fda.gov and search \"unused medications. \"     Please inform Palliative Medicine of any questions you may have about your pain medications by call [phone]). We look forward to seeing you at the next office visit.     Jr Harris MD                  GOALS OF CARE / TREATMENT PREFERENCES:   [====Goals of Care====]  GOALS OF CARE:  Patient / health care proxy stated goals: I've been in pain for so long, I'd like to keep it manageable    TREATMENT PREFERENCES:   Code Status:  [x] Attempt Resuscitation       [] Do Not Attempt Resuscitation    Advance Care Planning:  Advance Care Planning 11/8/2017   Patient's Healthcare Decision Maker is: Verbal statement (Legal Next of Kin remains as decision maker)   Primary Decision Maker Name Norris Tobias   Primary Decision Maker Phone Number [phone]   Primary Decision Maker Relationship to Patient Spouse     The palliative care team has discussed with patient / health care proxy about goals of care / treatment preferences for patient.  [====Goals of Care====]     PRESCRIPTIONS GIVEN:     Medications Ordered Today   Medications    DISCONTD: oxyCODONE IR (ROXICODONE) 30 mg immediate release tablet     Sig: Take 2 Tabs by mouth every four (4) hours as needed for up to 15 days. Max Daily Amount: 360 mg. Dispense:  150 Tab     Refill:  0    DISCONTD: oxyCODONE IR (ROXICODONE) 30 mg immediate release tablet     Sig: Take 2 Tabs by mouth every four (4) hours as needed for up to 15 days. Max Daily Amount: 360 mg. Dispense:  150 Tab     Refill:  0    oxyCODONE IR (ROXICODONE) 30 mg immediate release tablet     Sig: Take 2 Tabs by mouth every four (4) hours as needed for up to 15 days. Max Daily Amount: 360 mg. Dispense:  150 Tab     Refill:  0    oxyCODONE 60 mg TR12     Sig: Take 120 mg by mouth every eight (8) hours for 30 days. Max Daily Amount: 360 mg. Indications: SEVERE PAIN WITH OPIOID TOLERANCE     Dispense:  180 Each     Refill:  0       FOLLOW UP:     No future appointments. PHYSICIANS INVOLVED IN CARE:   Patient Care Team:  Jacquelin Jackson MD as PCP - General (Family Practice)  Zeny Adams MD as Physician (Hematology)  Radha Lee MD as Physician (Palliative Medicine)    Urology: Dr. Eugene Quinn 12/18 - about every 3 months; leaking - changed out  Renal Dr. Rajan Feliz     HISTORY:   Nursing documentation from date of visit reviewed. Reviewed patient-completed ESAS and advance care planning form.   Reviewed patient record in prescription monitoring program.    CHIEF COMPLAINT: Pain and swelling    HPI/SUBJECTIVE:    The patient is: [x] Verbal / [] Nonverbal     See summary above  Worsening pain  Erratic use of medication; uses more of IR then runs out and uses more of ER then runs out  Needs stabilization of this    [++++ Clinical Pain Assessment++++]  [++++Pain Severity++++]: Pain: 8  [++++Pain Character++++]:   [++++Pain Duration++++]:   [++++Pain Effect++++]:   [++++Pain Factors++++]:   [++++Pain Frequency++++]:   [++++Pain Location++++]:   [++++ Clinical Pain Assessment++++]    Now on 5-FU and Camptosar/Irinotecan as well as Herceptin  Camptosar can cause diarrhea    From 18 visit; also reviewed   Oxycontin 100mg every 8 hours (80 + 20)  Neither bottle available; has run out  Oxycodone IR 30mg  - maximum of 12 in 24 hours  18 - Oxy IR 30 #180 - #33  He only has about 6 days of the Oxy IR left    From Previous Visits:    Born in [facility] [place]  2 brothers and a sister; father ; mother is 80     2 daughters; [employer] kwame; another [address] [facility] [place]  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 2009; 24(2): 036194. 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 Its precise pathophysiology or etiology remains unclear. In addition to distinctive diverse skin manifestations, patients with polyfibromatosis have been previously reported to show erosive arthropathy with significant limitation of movement at affected joints. However, the presence of erosive polyarthropathy in polyfibromatosis has not emphasized in previous cases.  Here, we report a case of polyfibromatosis syndrome combined with painless massive structural destruction of hand and foot joints, and review the characteristics of erosive arthropathy in previous cases.    Metastatic esophageal cancer diagnosed 9/1/16  Had Chemotherapy and did well; responded  Dr. Goldie Renner at [facility] [place] follows him along with Dr. Marita Paz / [facility]  May 2017 - creatinine level elevation  June 2017 - bilateral stents; right first and then left  August 2017 -stents didn't work so had nephrostomy tubes at [facility]   September 2017 -  started having bowel obstruction; small and large bowel  September 2 to [place] and then September 12 to [facility] until September 27; then 3 days stay in [place]  They found an inflammatory process throughout pelvis  When performed ileostomy had ascites; malignant ascites  Restarted chemotherapy; 5-FU; Oxaliplatin (itching/rash);  Herceptin  They are replacing Oxaliplatin with a different medication    Life has changed dramatically  Not happy with all the tubes; not easy to do anything   Pain is constant; rectal pain  Pain and discharge from the rectal area; slowly improving  Feels like has something lodged in rectum  Annoying sensation  Improved significantly; slow improvement but is improved  Was in very severe pain in rectum    He isn't where he wants to be  It is still an effort to do anything  Getting dressed with all the tubes is difficult  Can't really work in the yard  Clothes can be restrictive  Old overalls; doesn't restrict    Diagnosed with 2 DVTs; severe swelling in both legs  Currently on diuretic and Eloquis  Left knee is hard to bend; without pain and can only bend a particular amount    He has been on medications for chronic pain  But his entire life; he had had keloids that cause pain - burning and sharp severe pain  Found a doctor in [place]; chemo and steroid injections; pulse dye laser   Name Dr. Andria Melgar all the neuropathic medications  Contractures in his 35s of his hands  Surgeries on hands; Dr. Nelida Ramsay  He has been on opioids chronically    Current pain regimen:  Oxycontin 100mg (80 + 20) every 8 hours  Oxy IR 30mg  - 5-6/day regular  Tried Xtampza ER but did not notice any improvement; went back to Oxycontin     FUNCTIONAL ASSESSMENT:     Palliative Performance Scale (PPS):  PPS: 70     PSYCHOSOCIAL/SPIRITUAL SCREENING:     Any spiritual / Religion concerns:  [] Yes /  [x] No    Caregiver Burnout:  [] Yes /  [x] No /  [] No Caregiver Present      Anticipatory grief assessment:   [x] Normal  / [] Maladaptive       ESAS Anxiety: Anxiety: 0    ESAS Depression: Depression: 0     REVIEW OF SYSTEMS:     The following systems were [x] reviewed / [] unable to be reviewed  Systems: constitutional, ears/nose/mouth/throat, respiratory, gastrointestinal, genitourinary, musculoskeletal, integumentary, neurologic, psychiatric, endocrine. Positive findings noted below. Modified ESAS Completed by: provider   Fatigue: 8 Drowsiness: 0   Depression: 0 Pain: 8   Anxiety: 0 Nausea: 0   Anorexia: 4 Dyspnea: 0   Best Well-Bein Constipation: No            PHYSICAL EXAM:     Wt Readings from Last 3 Encounters:   17 182 lb 9.6 oz (82.8 kg)   17 182 lb 9.6 oz (82.8 kg)   11 209 lb 14.1 oz (95.2 kg)     There were no vitals taken for this visit.   Last bowel movement: See Nursing Note    Constitutional: alert, oriented, fatigued, has lost weight by observation  Eyes: pupils equal, anicteric  ENMT: no nasal discharge, moist mucous membranes  Cardiovascular:   Respiratory: breathing not labored, symmetric  Gastrointestinal: soft non-tender, ileostomy right lower quadrant, bilateral nephrostomy tubes in flank region draining clear urine   Musculoskeletal: no deformity, no tenderness to palpation, edema bilateral lower extremities through to thighs (right > left)  Skin: warm, dry  Neurologic: following commands, moving all extremities, contractures of hand bilaterally (prior surgical releases)  Psychiatric: full affect, no hallucinations  Other:     HISTORY:     Past Medical History:   Diagnosis Date    Other ill-defined conditions(799.89)     polyfibromitosis      Past Surgical History:   Procedure Laterality Date    HX ORTHOPAEDIC      hand surgery      No family history on file. History reviewed, no pertinent family history. Social History   Substance Use Topics    Smoking status: Never Smoker    Smokeless tobacco: Never Used    Alcohol use No     Allergies   Allergen Reactions    Levaquin [Levofloxacin] Nausea and Vomiting    Xanax [Alprazolam] Other (comments)     Possible delirium      Current Outpatient Prescriptions   Medication Sig    [START ON 3/22/2018] oxyCODONE IR (ROXICODONE) 30 mg immediate release tablet Take 2 Tabs by mouth every four (4) hours as needed for up to 15 days. Max Daily Amount: 360 mg.    oxyCODONE 60 mg TR12 Take 120 mg by mouth every eight (8) hours for 30 days. Max Daily Amount: 360 mg. Indications: SEVERE PAIN WITH OPIOID TOLERANCE    ketamine (KETALAR) 5 mg/1 mL soln 5 mg/mL oral solution (compounded) Take 3 mL by mouth every six (6) hours.  dicyclomine (BENTYL) 10 mg capsule Take 1 Cap by mouth every four (4) hours as needed.  haloperidol (HALDOL) 2 mg/mL oral concentrate Take 1 mL by mouth every six (6) hours as needed. Indications: In case of Ketamine reaction    senna-docusate (PERICOLACE) 8.6-50 mg per tablet Take 2 Tabs by mouth two (2) times a day for 120 days.  ELIQUIS 5 mg tablet Take 1 Tab by mouth two (2) times a day. No current facility-administered medications for this visit. LAB DATA REVIEWED:   12/13/17  See Media;  Reviewed blood work done by [employer] / Dr. Andrés Plunkett and CT done by [facility] [place] (Creat 1.63 and Liver lesions smaller on CT)       CONTROLLED SUBSTANCES SAFETY ASSESSMENT (IF ON CONTROLLED SUBSTANCES):   Not currently prescribing patient's opioid medications    Reviewed opioid safety handout:  [x] Yes   [] No  24 hour opioid dose >150mg morphine equivalent/day:  [x] Yes   [] No  Benzodiazepines:  [] Yes   [x] No  Sleep apnea: [] Yes   [x] No  Urine Toxicology Testing within last 6 months:  [] Yes   [x] No  History of or new aberrant medication taking behaviors:  [] Yes   [x] No          Total time: 60min  Counseling / coordination time: min  > 50% counseling / coordination?:

## 2018-02-20 NOTE — PATIENT INSTRUCTIONS
Dear Jessi Mcintyre ,    It was a pleasure seeing you at home today. This is what we talked about:     1. 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 Cynthia Grover at [facility] on March 1   -You had a PET scan and we reviewed those results; it is inconclusive as to whether there is progression from the CT scan you had in November (hard to make that comparison  -You are having bloodwork tomorrow including tumor markers (we discussed tumor markers a bit at today's visit)  -You are restarting chemotherapy tomorrow / 2/21/18    -We will check in on you to see how you are tolerating this over the next week    2.  Pain management  -We are going to change your pain regimen as you are running out of either the IR or the Exetended Release causing you to have to take more of either when you run out; you required a very high dose of opioid because you have been on this medication for many many years due to your underlying disease state    -However, the inconsistency of the ingestion is causing erratic pain control    -I reviewed your Prescription Monitoring Program and it appears you are on about an every 3 week refill instead of every 30 days    -We are going to increase the Oxycontin to 60mg 2 tabs (120mg) every 8 hours for #180 per month  -I provided a script for 7 days and a script for 30 days so the medication can be ordered    -We are going to increase the number of Oxycodone IR 30mg to 2 tabs (60mg) every 4 hours as needed, each for 150 every 15 days  -Due to the number of pills, every 15 day scripts are necessary  -I provided 1 script and at follow up next week we will provide another 2 15-day scripts    -We want to make sure you do not take more than prescribed and thus don't run out early as that will affect your pain levels    -You are still using the Ketamine 2-3ml (10-15mg) at night and do not need a refill at this time    -Your bowels/ostomy is moving well with the bowel regimen you are on     3. Left sided groin/scotal pain  -We are sending a urine today, though you just finished the ciprofloxin  -The pain you are having in the left groin and scrotum may be from the stent that is in the left ureter  -I encouraged you to go ahead and make an appointment with Urology to assess this as it has been getting worse  -The discomfort could also be coming from the known clot in the upper left leg blood vessels    4. Edema/swelling  -The swelling is stable at this time  -Now it is more in the right leg and scrotum more than the left leg  -The US did not show a clot in the right leg, but did in the left upper blood vessels of the legs  -You now have the machine that can help the swelling and are beginning to use this   -You should weight yourself about 3 times a week to follow the fluid shifts  -Your new bed is helping positioning while you are at home    5. Advance Care Planning  -At this time you want to continue with the current treatment approach  -We will talk more about this in an upcoming visit    This is what you have shared with us about [facility] Thomas. Planning 11/8/2017   Patient's Healthcare Decision Maker is: Verbal statement (Legal Next of Kin remains as decision maker)   Primary Decision Maker Name Silvina Courtney   Primary Decision Maker Phone Number [phone]   Primary Decision Maker Relationship to Patient Spouse     The Palliative Medicine Team is here to support you and your family. We will see you again in a few weeks - we discussed how important it is to call us if you are not feeling well after chemo so we can make adjustments    If there are any concerns before that time, such as medication questions, worsening symptoms or a need to see a physician for an urgent or emergent situation; please call [phone] (COPE).  A physician is also on call after our normal business hours of 8am to 5pm.     In order to serve you better, please allow up to 48 hours for prescription refills to be processed. Certain medications may require more paperwork or a written prescription that you may need to  from the office. We appreciate you letting us know of any refill requests as soon as possible. We also would like you to sign up for [facility]ne as well. Sincerely,      Anitha Roe MD and the Palliative Medicine Team    OPIOID SAFETY HANDOUT    You are being prescribed a type of pain medication called an \"opioid\". This medication can be very effective. However, if not taken correctly, it can also be dangerous. Thus, there are necessary precautions for your safety and the safety of others. Following these will allow us to continue to safely prescribe this medication for your pain. Bring your original medication bottles to EVERY visit with the medication you have left in  them. We cannot give you a new prescription without verifying the previous prescription. Opioids can cause fatigue or sleepiness. **Do not take with other medications that make you sleepy. **Do not take with alcohol or other drugs. Opioids can cause constipation. **Take a combination stool softener/laxative while taking opioids. Only take your pain medication as prescribed on the bottle or instructions. Do not increase your pain medication dose without discussing it with your prescribing physician. If your pain becomes worse, please call Palliative Medicine at [phone]). Do not use the Emergency Department for your pain unless it is a life-threatening situation. Your pain medication was prescribed only for you. It is both unsafe and illegal to share or sell your pain medication. Do not drive within 72 hours of a change in dose or the addition of a new medication. Your pain medication should be kept in a locked container in your house. Only take out the number of pills you need for 24 hours.       Keep all opioids (and other medications) away from a child's reach. If you pill bottle is lost or stolen, we typically cannot write a new prescription for the same medication until the date it was supposed to be finished. In order to keep you safe, we access the [employer]. This allows us to make sure you are not prescribed a similar medication that could interact with the opioids we have prescribed. Additionally, every patient prescribed opioids for more than 3 months will be asked to give a urine sample for drug testing at least once. This is a necessary test as guided by the [facility] for patients who are prescribed opioid medications. To dispose of your unused medications safely visit: www.fda.gov and search \"unused medications. \"     Please inform Palliative Medicine of any questions you may have about your pain medications by call [phone]). We look forward to seeing you at the next office visit.     Maria Elena Cazares MD

## 2018-02-21 NOTE — PROGRESS NOTES
[facility]  Palliative Medicine Office  Nursing Note  [phone]  Fax [phone]    Patient Name: Chester Garsia  YOB: 1960      2/20/2018    Home visit made with Dr. Pravin Reece please see her note for complete assessment. Urine sample taken from left nephrostomy tube and taken to lab. New pain meds ordered, will call and reassess in next few days     Tina Power, RN Nurse home visit   Palliative Medicine      No future appointments.

## 2018-02-22 NOTE — TELEPHONE ENCOUNTER
Patient wanted to advised pharmacy didn't have all of his oxycodone. He will be out by Monday.  Will need a new prescription

## 2018-02-23 NOTE — PROGRESS NOTES
Palliative Medicine  Nursing Note  [phone]  Fax [phone]        Clinic Office Visit  Patient Name: Karoline Albrecht  YOB: 1960    2/23/2018      Advance Care Planning 11/8/2017   Patient's Healthcare Decision Maker is: Verbal statement (Legal Next of Kin remains as decision maker)   Primary Decision Maker Name Edy Bob   Primary Decision Maker Phone Number [phone]   Primary Decision Maker Relationship to Patient Spouse         Spoke with Mr. Ronny Zafar to let him know that his prescription for the oxycodone 30 mg take 2 tabs every for hours as needed for pain. He was written for 94 tablets because when he filled his last prescription from 2/20 the pharmacy only had 56 tablets to give him so he needed a new prescription for the difference. His wife will  the prescription from the [address] [place] [address] office today.      Moose Montoya, MARINAN, RN, OCN, VIA [facility]  Palliative Medicine

## 2018-02-23 NOTE — PROGRESS NOTES
[facility]  Palliative Medicine Office  Nursing Note  [phone]  Fax [phone]    Patient Name: Fer Omer  YOB: 1960      2/23/2018    Call to Dr. Kala Beauchamp office to let office know about his urine results, they wanted me to fax results to them which I did    Gwen Steiner, RN Nurse home visit   Palliative Medicine      No future appointments.

## 2018-02-26 NOTE — PROGRESS NOTES
Spoke to Mr. Katie Carroll, he took he prescription to [facility] and it was too early to fill but a day or two according to insurance so he was asking if we could touch base with the pharmacy and give them the okay to fill it. The pharmacy was called and they will fill the prescription. Mr. Katie Carroll was called to let him know they are going to fill it for him.

## 2018-02-27 NOTE — PROGRESS NOTES
Palliative Medicine  Nursing Note  [phone]  Fax [phone]        Clinic Office Visit  Patient Name: Dallas Downs  YOB: 1960    2/27/2018      Advance Care Planning 11/8/2017   Patient's Healthcare Decision Maker is: Verbal statement (Legal Next of Kin remains as decision maker)   Primary Decision Maker Name Laurie Gray   Primary Decision Maker Phone Number [phone]   Primary Decision Maker Relationship to Patient Spouse       Received phone call from [facility] at Dr. Moisés Cain office, she explained that Dr. Jamil Funez would like to start Mr. Mino Schmidt on Doxycycline for the bacterial infection found from the urine culture sent from his urostomy bag on 2/20/2018. The prescription was called in today to the [facility] pharmacy by [facility] and she called to let Mr. Mino Schmidt know that. Then called to let PM know it as well. Will forward to Dr. Irene Lemons.       Chito Bonilla, MARINAN, RN, OCN, [facility]IA [facility]  Palliative Medicine

## 2018-02-27 NOTE — PROGRESS NOTES
Palliative Medicine  Nursing Note  [phone]  Fax [phone]        Clinic Office Visit  Patient Name: Arely Burch  YOB: 1960    2/27/2018      Advance Care Planning 11/8/2017   Patient's Healthcare Decision Maker is: Verbal statement (Legal Next of Kin remains as decision maker)   Primary Decision Maker Name Sneha Richard   Primary Decision Maker Phone Number [phone]   Primary Decision Maker Relationship to Patient Spouse         Request sent to [place] for Prior Authorization for Mr. Villanueva's Oxycodone 60 mg TR12 tablets. Received faxed confirmation that it was approved effective 2/27/2018 through 2/27/2019. Request ID # [id number]. Patient and Pharmacy were notified.       MARINA RousseauN, RN, OCN, VIA [facility]  Palliative Medicine

## 2018-03-05 NOTE — PROGRESS NOTES
Palliative Medicine Outpatient Services  [place]: [phone] ([phone])    Patient Name: Zahraa Allan  YOB: 1960    Date of Current Visit: 3/5/18    Location of Current Visit:    [x] HOME    Date of Initial Visit: 11/8/17  Requesting Physician: Dr. Jenni Foster  Primary Care Physician: Oneal Carter MD      SUMMARY:   Zahraa Allan is a 61y.o. year old with a past history of polyfibrosis syndrome (see below; contractures, keloids, retroperitoneal fibrosis) and esophageal cancer with liver mets diagnosed in 2016 now s/p bilateral nephrostomy tubes and ileostomy as well as DVT now with extensive lower extremity swelling, who was referred to Palliative Medicine by Michelle Crandall for unmet palliative care needs. The patients social history includes (see below). Palliative Medicine is addressing the following current patient/family concerns: intensity of life changing illness/procedures, pain and caregiver support. I reviewed PET scan from Friday 2/16/18 while in the home plus CT scan report from November 2017     PALLIATIVE DIAGNOSES:       ICD-10-CM ICD-9-CM    1. Cancer associated pain G89.3 338.3 oxyCODONE 60 mg TR12      oxyCODONE IR (ROXICODONE) 30 mg immediate release tablet      DISCONTINUED: oxyCODONE IR (ROXICODONE) 30 mg immediate release tablet   2. Scrotal pain N50.82 608.9 oxyCODONE 60 mg TR12      oxyCODONE IR (ROXICODONE) 30 mg immediate release tablet      DISCONTINUED: oxyCODONE IR (ROXICODONE) 30 mg immediate release tablet   3. Back pain, unspecified back location, unspecified back pain laterality, unspecified chronicity M54.9 724.5 oxyCODONE 60 mg TR12      oxyCODONE IR (ROXICODONE) 30 mg immediate release tablet      DISCONTINUED: oxyCODONE IR (ROXICODONE) 30 mg immediate release tablet      PLAN:     Patient Instructions     Dear Zahraa Allan ,    It was a pleasure seeing you at home today. This is what we talked about:     1.  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 Stomach issues  -You are still having stomach issues related to the Campostar  -We are going to try Zofran 4mg disintegrating tablet three times daily for several days  -I will send this to the pharmacy    3. Pain management  -We talked about being really consistent with the pain medication  -You have been taking the Oxycodone 30mg 2 tabs every 4 hours consistently which is about 12 tablets a day  -You are almost out of this; we talked about setting aside 10 per day for maximum dosing   -You are also taking Tylenol 500mg - we talked about taking no more than 6 tablets or 3000mg in 24 hours of Tylenol  -I will write the scripts today for the Oxycodone 30mg; 150 pills for the next 15 days x 3 scripts 3/5/18; 3/20/18  -Continue Oxycontin 60mg 2 tabs (120mg) every 8 hours for #180 per month; 138 left  -You haven't used the Ketamine 2-3ml (10-15mg) recently, in about week  -We talked about opioid safety and I prescribed Naloxone nasal spray in case of an emergency/accidental overdose  -We will mail you more information on this    3. Left sided groin/scotal pain  -Swelling and pain is better  -You are using ice on the scrotum   -The pain is better  -The pressure in the rectum is still difficult and keeps you from getting up and around   -You are up more during the day; a few hours at a time  -Dr. Jerline Cabot and Dr. Tuan Gallardo both have emphasized getting up and around    4.  Advance Care Planning  -At this time you want to continue with the current treatment approach  -We will talk more about this in an upcoming visit  -We would like to review your Advance Directive which you completed with a      This is what you have shared with us about Advance Care Planning  Advance Care Planning 3/6/2018   Patient's Healthcare Decision Maker is: Verbal statement (Legal Next of Kin remains as decision maker)   Primary Decision Maker Name Edy Bob   Primary Decision Maker Phone Number [phone]   Primary Decision Maker Relationship to Patient Spouse   Confirm Advance Directive Yes, not on file   Does the patient have other document types Power of      The Palliative Medicine Team is here to support you and your family. We will see you again in a few weeks - we discussed how important it is to call us if you are not feeling well after chemo so we can make adjustments    If there are any concerns before that time, such as medication questions, worsening symptoms or a need to see a physician for an urgent or emergent situation; please call [phone] ([phone]). A physician is also on call after our normal business hours of 8am to 5pm.     In order to serve you better, please allow up to 48 hours for prescription refills to be processed. Certain medications may require more paperwork or a written prescription that you may need to  from the office. We appreciate you letting us know of any refill requests as soon as possible. We also would like you to sign up for [employer] as well. Sincerely,      Denisha Duff MD and the Palliative Medicine Team    OPIOID SAFETY HANDOUT    You are being prescribed a type of pain medication called an \"opioid\". This medication can be very effective. However, if not taken correctly, it can also be dangerous. Thus, there are necessary precautions for your safety and the safety of others. Following these will allow us to continue to safely prescribe this medication for your pain. Bring your original medication bottles to EVERY visit with the medication you have left in  them. We cannot give you a new prescription without verifying the previous prescription.       Opioids can cause fatigue or sleepiness. **Do not take with other medications that make you sleepy. **Do not take with alcohol or other drugs. Opioids can cause constipation. **Take a combination stool softener/laxative while taking opioids. Only take your pain medication as prescribed on the bottle or instructions. Do not increase your pain medication dose without discussing it with your prescribing physician. If your pain becomes worse, please call Palliative Medicine at [phone]). Do not use the Emergency Department for your pain unless it is a life-threatening situation. Your pain medication was prescribed only for you. It is both unsafe and illegal to share or sell your pain medication. Do not drive within 72 hours of a change in dose or the addition of a new medication. Your pain medication should be kept in a locked container in your house. Only take out the number of pills you need for 24 hours. Keep all opioids (and other medications) away from a child's reach. If you pill bottle is lost or stolen, we typically cannot write a new prescription for the same medication until the date it was supposed to be finished. In order to keep you safe, we access the [employer]. This allows us to make sure you are not prescribed a similar medication that could interact with the opioids we have prescribed. Additionally, every patient prescribed opioids for more than 3 months will be asked to give a urine sample for drug testing at least once. This is a necessary test as guided by the [facility] for patients who are prescribed opioid medications. To dispose of your unused medications safely visit: www.fda.gov and search \"unused medications. \"     Please inform Palliative Medicine of any questions you may have about your pain medications by call [phone] [phone]).      We look forward to seeing you at the next office visit. Alber Amezcua MD              Learning About Naloxone for Opioid Overdose  What is naloxone? Opioids are strong pain medicines. Examples include hydrocodone, oxycodone, fentanyl, and morphine. Heroin is an example of an illegal opioid. Taking too much of an opioid can cause death. An overdose is an emergency. Naloxone is a medicine that reverses the effects of an overdose. If you take it soon enough after an overdose, it can save your life. Naloxone comes in a rescue kit you can carry with you. You may hear it called a Narcan kit for short. The rescue kit may contain:  · The medicine. · Syringes and needles. · A nasal adapter for the syringes. · A separate nasal spray device. Your doctor can give you a prescription for a rescue kit and show you how to use it. In some places you can buy Narcan kits without a prescription. When is naloxone used? Naloxone is used when a person shows signs of an opioid overdose. A person may have overdosed if he or she is:  · Sleepy or hard to wake up. · Confused. · Not breathing normally. Make sure your family and friends know about these signs of an overdose. If someone appears to have overdosed, call 911. A drug overdose is an emergency. How do you use it? If you overdose, you may not be able to give yourself the medicine. So it's very important to teach friends and family how and when to give it to you. Rescue kits come with instructions. There are two ways to give the medicine. Many rescue kits can be used for either method. The methods are:  · Injection with needle and syringe. ¨ Training may be needed in order to use this method correctly. ¨ Some rescue kits come with automatic injectors that don't require special training. ¨ The medicine can be injected through clothing. · Nasal spray. ¨ Many rescue kits come with a nasal adapter.  It attaches to the syringe and turns the medicine into a mist.  ¨ The mist is sprayed into the nose of a person who has overdosed. The person needs to be lying down when the mist is sprayed. Overdose symptoms may return a few minutes after the first dose from the rescue kit. If that happens, a second dose is needed. Rescue kits come with two doses for that reason. Keep your rescue kit with you always. You never know when you might need it. If you think you or someone else may have overdosed but you're not sure, use the kit anyway. Aside from going through withdrawal, which may be uncomfortable, there is no downside to using this medicine. Always go to the emergency room after using naloxone. Doctors will want to make sure the overdose has been reversed. Follow-up care is a key part of your treatment and safety. Be sure to make and go to all appointments, and call your doctor if you are having problems. It's also a good idea to know your test results and keep a list of the medicines you take. Where can you learn more? Go to http://giancarlo-adelaide.info/. Enter J375 in the search box to learn more about \"Learning About Naloxone for Opioid Overdose. \"  Current as of: November 3, 2016  Content Version: 11.4  © 7756-1728 [employer]. Care instructions adapted under license by [employer] (which disclaims liability or warranty for this information). If you have questions about a medical condition or this instruction, always ask your healthcare professional. [place] [address] any warranty or liability for your use of this information.            GOALS OF CARE / TREATMENT PREFERENCES:   [====Goals of Care====]  GOALS OF CARE:  Patient / health care proxy stated goals: I've been in pain for so long, I'd like to keep it manageable    TREATMENT PREFERENCES:   Code Status:  [x] Attempt Resuscitation       [] Do Not Attempt Resuscitation    Advance Care Planning:  Advance Care Planning 3/6/2018   Patient's Healthcare Decision Maker is: Verbal statement (Legal Next of Kin remains as decision maker)   Primary Decision Maker Name Isabela Bell   Primary Decision Maker Phone Number [phone]   Primary Decision Maker Relationship to Patient Spouse   Confirm Advance Directive Yes, not on file   Does the patient have other document types Power of      The palliative care team has discussed with patient / health care proxy about goals of care / treatment preferences for patient.  [====Goals of Care====]     PRESCRIPTIONS GIVEN:     Medications Ordered Today   Medications    DISCONTD: oxyCODONE IR (ROXICODONE) 30 mg immediate release tablet     Sig: Take 2 Tabs by mouth every four (4) hours as needed. Max Daily Amount: 360 mg. Up to 10 pills in 24 hours     Dispense:  150 Tab     Refill:  0    oxyCODONE 60 mg TR12     Sig: Take 120 mg by mouth every eight (8) hours for 30 days. Max Daily Amount: 360 mg. Indications: SEVERE PAIN WITH OPIOID TOLERANCE     Dispense:  180 Each     Refill:  0    ondansetron (ZOFRAN ODT) 4 mg disintegrating tablet     Sig: Take 1 Tab by mouth every six (6) hours as needed for Nausea for up to 30 days. Dispense:  90 Tab     Refill:  2    oxyCODONE IR (ROXICODONE) 30 mg immediate release tablet     Sig: Take 2 Tabs by mouth every four (4) hours as needed for up to 15 days. Max Daily Amount: 360 mg. Up to 10 pills in 24 hours     Dispense:  150 Tab     Refill:  0       FOLLOW UP:   2-3 week follow up for symptoms and goals of care    No future appointments. PHYSICIANS INVOLVED IN CARE:   Patient Care Team:  Jason Bashir MD as PCP - General (Family Practice)  Ninfa Ortiz MD as Physician (Hematology)  Minesh Dalal MD as Physician (Palliative Medicine)    Urology: Dr. Serafin Harper 12/18 - about every 3 months; leaking - changed out  Renal Dr. Dave Legdiana Gomes at [facility]     HISTORY:   Nursing documentation from date of visit reviewed. Reviewed patient-completed ESAS and advance care planning form.   Reviewed patient record in prescription monitoring program.    CHIEF COMPLAINT: Pain and swelling    HPI/SUBJECTIVE:    The patient is: [x] Verbal / [] Nonverbal     Patient doing better than last cycle  Still a lot of stomach gurgling and churning  Increase output through ostomy  Swelling in scrotum and legs  He feels though this is somewhat better  Pain is a little better with more consistent regimen  Still sometimes misses dose of long acting so is still using a lot of short acting; on a schedule every 4 hours    [++++ Clinical Pain Assessment++++]  [++++Pain Severity++++]: Pain: 4  [++++Pain Character++++]: aching  [++++Pain Duration++++]: hours  [++++Pain Effect++++]: hard to get up  [++++Pain Factors++++]: worse with standing/walking  [++++Pain Frequency++++]: daily  [++++Pain Location++++]: scrotum and back, stomach  [++++ Clinical Pain Assessment++++]    Now on 5-FU and Camptosar/Irinotecan as well as Herceptin  Camptosar can cause diarrhea    From Previous Visits:    Born in [facility] [place]  2 brothers and a sister; father ; mother is 80     2 daughters; [employer] kwame; another [address] [facility]  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 2009 Apr; 24(2): 619774. 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 Its precise pathophysiology or etiology remains unclear.  In addition to distinctive diverse skin manifestations, patients with polyfibromatosis have been previously reported to show erosive arthropathy with significant limitation of movement at affected joints. However, the presence of erosive polyarthropathy in polyfibromatosis has not emphasized in previous cases. Here, we report a case of polyfibromatosis syndrome combined with painless massive structural destruction of hand and foot joints, and review the characteristics of erosive arthropathy in previous cases. Metastatic esophageal cancer diagnosed 9/1/16  Had Chemotherapy and did well; responded  Dr. Robbin Horn at [facility] follows him along with Dr. Sharyn Carrasco / [facility]  May 2017 - creatinine level elevation  June 2017 - bilateral stents; right first and then left  August 2017 -stents didn't work so had nephrostomy tubes at [facility]   September 2017 -  started having bowel obstruction; small and large bowel  September 2 to [facility] and then September 12 to [facility] until September 27; then 3 days stay in [facility]  They found an inflammatory process throughout pelvis  When performed ileostomy had ascites; malignant ascites  Restarted chemotherapy; 5-FU; Oxaliplatin (itching/rash);  Herceptin  They are replacing Oxaliplatin with a different medication    Life has changed dramatically  Not happy with all the tubes; not easy to do anything   Pain is constant; rectal pain  Pain and discharge from the rectal area; slowly improving  Feels like has something lodged in rectum  Annoying sensation  Improved significantly; slow improvement but is improved  Was in very severe pain in rectum    He isn't where he wants to be  It is still an effort to do anything  Getting dressed with all the tubes is difficult  Can't really work in the yard  Clothes can be restrictive  Old overalls; doesn't restrict    Diagnosed with 2 DVTs; severe swelling in both legs  Currently on diuretic and Eloquis  Left knee is hard to bend; without pain and can only bend a particular amount    He has been on medications for chronic pain  But his entire life; he had had keloids that cause pain - burning and sharp severe pain  Found a doctor in [place]; chemo and steroid injections; pulse dye laser   Name Dr. Morena Soares all the neuropathic medications  Contractures in his 35s of his hands  Surgeries on hands; Dr. Paradise Palacios  He has been on opioids chronically    Current pain regimen:  Oxycontin 100mg (80 + 20) every 8 hours  Oxy IR 30mg  - 5-6/day regular  Tried Xtampza ER but did not notice any improvement; went back to Oxycontin     FUNCTIONAL ASSESSMENT:     Palliative Performance Scale (PPS): 70        PSYCHOSOCIAL/SPIRITUAL SCREENING:     Any spiritual / Muslim concerns:  [] Yes /  [x] No    Caregiver Burnout:  [] Yes /  [x] No /  [] No Caregiver Present      Anticipatory grief assessment:   [x] Normal  / [] Maladaptive       ESAS Anxiety: Anxiety: 0    ESAS Depression: Depression: 0     REVIEW OF SYSTEMS:     The following systems were [x] reviewed / [] unable to be reviewed  Systems: constitutional, ears/nose/mouth/throat, respiratory, gastrointestinal, genitourinary, musculoskeletal, integumentary, neurologic, psychiatric, endocrine. Positive findings noted below. Modified ESAS Completed by: provider   Fatigue: 4 Drowsiness: 0   Depression: 0 Pain: 4   Anxiety: 0 Nausea: 0   Anorexia: 4 Dyspnea: 0     Constipation: No            PHYSICAL EXAM:     Wt Readings from Last 3 Encounters:   12/13/17 182 lb 9.6 oz (82.8 kg)   11/08/17 182 lb 9.6 oz (82.8 kg)   04/08/11 209 lb 14.1 oz (95.2 kg)     There were no vitals taken for this visit.  - See nursing note, VS completed and reviewed and did not cross over into note  Last bowel movement: See Nursing Note    Constitutional: alert, oriented, fatigued, has lost weight by observation  Eyes: pupils equal, anicteric  ENMT: no nasal discharge, moist mucous membranes  Cardiovascular:   Respiratory: breathing not labored, symmetric  Gastrointestinal: soft non-tender, ileostomy right lower quadrant, bilateral nephrostomy tubes in flank region draining clear urine   Musculoskeletal: no deformity, no tenderness to palpation, edema bilateral lower extremities through to thighs (right > left); scrotal edema without skin breakdown  Skin: warm, dry  Neurologic: following commands, moving all extremities, contractures of hand bilaterally (prior surgical releases)  Psychiatric: full affect, no hallucinations  Other:     HISTORY:     Past Medical History:   Diagnosis Date    Other ill-defined conditions(799.89)     polyfibromitosis      Past Surgical History:   Procedure Laterality Date    HX ORTHOPAEDIC      hand surgery      No family history on file. History reviewed, no pertinent family history. Social History   Substance Use Topics    Smoking status: Never Smoker    Smokeless tobacco: Never Used    Alcohol use No     Allergies   Allergen Reactions    Levaquin [Levofloxacin] Nausea and Vomiting    Xanax [Alprazolam] Other (comments)     Possible delirium      Current Outpatient Prescriptions   Medication Sig    diphenoxylate-atropine (LOMOTIL) 2.5-0.025 mg per tablet Take 1 Tab by mouth four (4) times daily as needed for Diarrhea.  [START ON 3/26/2018] oxyCODONE 60 mg TR12 Take 120 mg by mouth every eight (8) hours for 30 days. Max Daily Amount: 360 mg. Indications: SEVERE PAIN WITH OPIOID TOLERANCE    ondansetron (ZOFRAN ODT) 4 mg disintegrating tablet Take 1 Tab by mouth every six (6) hours as needed for Nausea for up to 30 days.  oxyCODONE IR (ROXICODONE) 30 mg immediate release tablet Take 2 Tabs by mouth every four (4) hours as needed for up to 15 days. Max Daily Amount: 360 mg. Up to 10 pills in 24 hours    ketamine (KETALAR) 5 mg/1 mL soln 5 mg/mL oral solution (compounded) Take 3 mL by mouth every six (6) hours.  senna-docusate (PERICOLACE) 8.6-50 mg per tablet Take 2 Tabs by mouth two (2) times a day for 120 days.     ELIQUIS 5 mg tablet Take 1 Tab by mouth two (2) times a day.    dicyclomine (BENTYL) 10 mg capsule Take 1 Cap by mouth every four (4) hours as needed.  haloperidol (HALDOL) 2 mg/mL oral concentrate Take 1 mL by mouth every six (6) hours as needed. Indications: In case of Ketamine reaction     No current facility-administered medications for this visit. LAB DATA REVIEWED:   12/13/17  See Media;  Reviewed blood work done by [facility] / Dr. Violeta Bocanegra and CT [place] [facility] (Creat 1.63 and Liver lesions smaller on CT)       CONTROLLED SUBSTANCES SAFETY ASSESSMENT (IF ON CONTROLLED SUBSTANCES):   Not currently prescribing patient's opioid medications    Reviewed opioid safety handout:  [x] Yes   [] No  24 hour opioid dose >150mg morphine equivalent/day:  [x] Yes   [] No  Benzodiazepines:  [] Yes   [x] No  Sleep apnea:  [] Yes   [x] No  Urine Toxicology Testing within last 6 months:  [] Yes   [x] No  History of or new aberrant medication taking behaviors:  [] Yes   [x] No          Total time: 60min  Counseling / coordination time: min  > 50% counseling / coordination?:

## 2018-03-05 NOTE — PROGRESS NOTES
[facility]  Palliative Medicine Office  Nursing Note  [phone]  Fax [phone]    Patient Name: Gerri Bowers  YOB: 1960      3/5/2018    Home visit made with Dr. Matilde Skinner see her note for complete assessment. All pills counted and reported Supported given to wife and patient. Fabiano Noriega RN Nurse home visit   Palliative Medicine        No future appointments.

## 2018-03-05 NOTE — PATIENT INSTRUCTIONS
Dear Mei Sahni ,    It was a pleasure seeing you at home today. This is what we talked about:     1. 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 Stomach issues  -You are still having stomach issues related to the Campostar  -We are going to try Zofran 4mg disintegrating tablet three times daily for several days  -I will send this to the pharmacy    3. Pain management  -We talked about being really consistent with the pain medication  -You have been taking the Oxycodone 30mg 2 tabs every 4 hours consistently which is about 12 tablets a day  -You are almost out of this; we talked about setting aside 10 per day for maximum dosing   -You are also taking Tylenol 500mg - we talked about taking no more than 6 tablets or 3000mg in 24 hours of Tylenol  -I will write the scripts today for the Oxycodone 30mg; 150 pills for the next 15 days x 3 scripts 3/5/18; 3/20/18  -Continue Oxycontin 60mg 2 tabs (120mg) every 8 hours for #180 per month; 138 left  -You haven't used the Ketamine 2-3ml (10-15mg) recently, in about week  -We talked about opioid safety and I prescribed Naloxone nasal spray in case of an emergency/accidental overdose  -We will mail you more information on this    3. Left sided groin/scotal pain  -Swelling and pain is better  -You are using ice on the scrotum   -The pain is better  -The pressure in the rectum is still difficult and keeps you from getting up and around   -You are up more during the day; a few hours at a time  -Dr. Fernandez Hercules and Dr. Laurie Calderón both have emphasized getting up and around    4.  Advance Care Planning  -At this time you want to continue with the current treatment approach  -We will talk more about this in an upcoming visit  -We would like to review your Advance Directive which you completed with a      This is what you have shared with us about Alban Thomas. Planning 3/6/2018   Patient's Healthcare Decision Maker is: Verbal statement (Legal Next of Kin remains as decision maker)   Primary Decision Maker Name Jillian Rock   Primary Decision Maker Phone Number [phone]   Primary Decision Maker Relationship to Patient Spouse   Confirm Advance Directive Yes, not on file   Does the patient have other document types Power of      The Palliative Medicine Team is here to support you and your family. We will see you again in a few weeks - we discussed how important it is to call us if you are not feeling well after chemo so we can make adjustments    If there are any concerns before that time, such as medication questions, worsening symptoms or a need to see a physician for an urgent or emergent situation; please call [phone] (COPE). A physician is also on call after our normal business hours of 8am to 5pm.     In order to serve you better, please allow up to 48 hours for prescription refills to be processed. Certain medications may require more paperwork or a written prescription that you may need to  from the office. We appreciate you letting us know of any refill requests as soon as possible. We also would like you to sign up for [employer] as well. Sincerely,      Chao Patel MD and the Palliative Medicine Team    OPIOID SAFETY HANDOUT    You are being prescribed a type of pain medication called an \"opioid\". This medication can be very effective. However, if not taken correctly, it can also be dangerous. Thus, there are necessary precautions for your safety and the safety of others. Following these will allow us to continue to safely prescribe this medication for your pain.       Bring your original medication bottles to EVERY visit with the medication you have left in  them. We cannot give you a new prescription without verifying the previous prescription. Opioids can cause fatigue or sleepiness. **Do not take with other medications that make you sleepy. **Do not take with alcohol or other drugs. Opioids can cause constipation. **Take a combination stool softener/laxative while taking opioids. Only take your pain medication as prescribed on the bottle or instructions. Do not increase your pain medication dose without discussing it with your prescribing physician. If your pain becomes worse, please call Palliative Medicine at [phone]). Do not use the Emergency Department for your pain unless it is a life-threatening situation. Your pain medication was prescribed only for you. It is both unsafe and illegal to share or sell your pain medication. Do not drive within 72 hours of a change in dose or the addition of a new medication. Your pain medication should be kept in a locked container in your house. Only take out the number of pills you need for 24 hours. Keep all opioids (and other medications) away from a child's reach. If you pill bottle is lost or stolen, we typically cannot write a new prescription for the same medication until the date it was supposed to be finished. In order to keep you safe, we access the [address]. This allows us to make sure you are not prescribed a similar medication that could interact with the opioids we have prescribed. Additionally, every patient prescribed opioids for more than 3 months will be asked to give a urine sample for drug testing at least once. This is a necessary test as guided by the [facility] for patients who are prescribed opioid medications. To dispose of your unused medications safely visit: www.fda.gov and search \"unused medications. \"     Please inform Palliative Medicine of any questions you may have about your pain medications by call [phone] We look forward to seeing you at the next office visit. Sasha Ash MD              Learning About Naloxone for Opioid Overdose  What is naloxone? Opioids are strong pain medicines. Examples include hydrocodone, oxycodone, fentanyl, and morphine. Heroin is an example of an illegal opioid. Taking too much of an opioid can cause death. An overdose is an emergency. Naloxone is a medicine that reverses the effects of an overdose. If you take it soon enough after an overdose, it can save your life. Naloxone comes in a rescue kit you can carry with you. You may hear it called a Narcan kit for short. The rescue kit may contain:  · The medicine. · Syringes and needles. · A nasal adapter for the syringes. · A separate nasal spray device. Your doctor can give you a prescription for a rescue kit and show you how to use it. In some places you can buy Narcan kits without a prescription. When is naloxone used? Naloxone is used when a person shows signs of an opioid overdose. A person may have overdosed if he or she is:  · Sleepy or hard to wake up. · Confused. · Not breathing normally. Make sure your family and friends know about these signs of an overdose. If someone appears to have overdosed, call 911. A drug overdose is an emergency. How do you use it? If you overdose, you may not be able to give yourself the medicine. So it's very important to teach friends and family how and when to give it to you. Rescue kits come with instructions. There are two ways to give the medicine. Many rescue kits can be used for either method. The methods are:  · Injection with needle and syringe. ¨ Training may be needed in order to use this method correctly. ¨ Some rescue kits come with automatic injectors that don't require special training. ¨ The medicine can be injected through clothing.   · Nasal spray.  ¨ Many rescue kits come with a nasal adapter. It attaches to the syringe and turns the medicine into a mist.  ¨ The mist is sprayed into the nose of a person who has overdosed. The person needs to be lying down when the mist is sprayed. Overdose symptoms may return a few minutes after the first dose from the rescue kit. If that happens, a second dose is needed. Rescue kits come with two doses for that reason. Keep your rescue kit with you always. You never know when you might need it. If you think you or someone else may have overdosed but you're not sure, use the kit anyway. Aside from going through withdrawal, which may be uncomfortable, there is no downside to using this medicine. Always go to the emergency room after using naloxone. Doctors will want to make sure the overdose has been reversed. Follow-up care is a key part of your treatment and safety. Be sure to make and go to all appointments, and call your doctor if you are having problems. It's also a good idea to know your test results and keep a list of the medicines you take. Where can you learn more? Go to http://giancarlo-adelaide.info/. Enter T469 in the search box to learn more about \"Learning About Naloxone for Opioid Overdose. \"  Current as of: November 3, 2016  Content Version: 11.4  © 2445-3419 Healthwise, Incorporated. Care instructions adapted under license by [employer] (which disclaims liability or warranty for this information). If you have questions about a medical condition or this instruction, always ask your healthcare professional. [place] [address] any warranty or liability for your use of this information.

## 2018-03-05 NOTE — MR AVS SNAPSHOT
[address] [facility] [address] [place] [address] 
[phone] Patient: Krunal Quintana MRN: [id number] CDL:[id number] Visit Information Date & Time Provider Department Dept. Phone Encounter #  
 3/5/2018 12:00 PM [facility] [phone] [id number] Follow-up Instructions Return in about 4 weeks (around 4/2/2018). Follow-up and Disposition History Upcoming Health Maintenance Date Due Hepatitis C Screening 1960 DTaP/Tdap/Td series (1 - Tdap) 5/10/1980 FOBT Q 1 YEAR AGE 50-75 5/10/2009 Pneumococcal 19-64 Highest Risk (1 of 3 - PCV13) 11/14/2018* Influenza Age 5 to Adult 11/14/2018* *Topic was postponed. The date shown is not the original due date. Allergies as of 3/5/2018  Review Complete On: 2/16/2018 By: Alber Amezcua MD  
  
 Severity Noted Reaction Type Reactions Levaquin [Levofloxacin]  04/08/2011    Nausea and Vomiting Xanax [Alprazolam] Low 01/25/2018   Side Effect Other (comments) Possible delirium Current Immunizations  Never Reviewed No immunizations on file. Not reviewed this visit You Were Diagnosed With   
  
 Codes Comments Cancer associated pain     ICD-10-CM: G89.3 ICD-9-CM: 338.3 Scrotal pain     ICD-10-CM: N50.82 ICD-9-CM: 608.9 Back pain, unspecified back location, unspecified back pain laterality, unspecified chronicity     ICD-10-CM: M54.9 ICD-9-CM: 724.5 Vitals Smoking Status Never Smoker Preferred Pharmacy Pharmacy Name Phone [id number] [place] [phone] Your Updated Medication List  
  
   
This list is accurate as of 3/5/18 11:59 PM.  Always use your most recent med list.  
  
  
  
  
 dicyclomine 10 mg capsule Commonly known as:  BENTYL Take 1 Cap by mouth every four (4) hours as needed. ELIQUIS 5 mg tablet Generic drug:  apixaban Take 1 Tab by mouth two (2) times a day.  
  
 haloperidol 2 mg/mL oral concentrate Commonly known as:  HALDOL Take 1 mL by mouth every six (6) hours as needed. Indications: In case of Ketamine reaction  
  
 ketamine 5 mg/1 mL Soln 5 mg/mL oral solution (compounded) Commonly known as:  KETALAR Take 3 mL by mouth every six (6) hours. LOMOTIL 2.5-0.025 mg per tablet Generic drug:  diphenoxylate-atropine Take 1 Tab by mouth four (4) times daily as needed for Diarrhea. ondansetron 4 mg disintegrating tablet Commonly known as:  ZOFRAN ODT Take 1 Tab by mouth every six (6) hours as needed for Nausea for up to 30 days. * oxyCODONE IR 30 mg immediate release tablet Commonly known as:  Juice Vargas Take 2 Tabs by mouth every four (4) hours as needed for up to 15 days. Max Daily Amount: 360 mg. Up to 10 pills in 24 hours * oxyCODONE 60 mg Tr12 Take 120 mg by mouth every eight (8) hours for 30 days. Max Daily Amount: 360 mg. Indications: SEVERE PAIN WITH OPIOID TOLERANCE Start taking on:  3/26/2018  
  
 senna-docusate 8.6-50 mg per tablet Commonly known as:  Pau Ridley Take 2 Tabs by mouth two (2) times a day for 120 days. * Notice: This list has 2 medication(s) that are the same as other medications prescribed for you. Read the directions carefully, and ask your doctor or other care provider to review them with you. Prescriptions Printed Refills  
 oxyCODONE 60 mg TR12 0 Starting on: 3/26/2018 Sig: Take 120 mg by mouth every eight (8) hours for 30 days. Max Daily Amount: 360 mg. Indications: SEVERE PAIN WITH OPIOID TOLERANCE Class: Print Route: Oral  
 oxyCODONE IR (ROXICODONE) 30 mg immediate release tablet 0  Sig: Take 2 Tabs by mouth every four (4) hours as needed for up to 15 days. Max Daily Amount: 360 mg. Up to 10 pills in 24 hours Class: Print Route: Oral  
  
Prescriptions Sent to Pharmacy Refills  
 ondansetron (ZOFRAN ODT) 4 mg disintegrating tablet 2 Sig: Take 1 Tab by mouth every six (6) hours as needed for Nausea for up to 30 days. Class: Normal  
 Pharmacy: [id number]- [address]  #: [phone] Route: Oral  
  
Follow-up Instructions Return in about 4 weeks (around 4/2/2018). Patient Instructions Dear Laura Perry , It was a pleasure seeing you at home today. This is what we talked about: 1. 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 
-You restarted chemotherapy on 2/21/18 
-You are doing better this time with the cycle 
-The dose has been reduced slightly 
-You would like to continue chemo as you feel its helping pain and swelling 
-You are still having leg and scrotal swelling but feel its improving 2. Stomach issues 
-You are still having stomach issues related to the Campostar 
-We are going to try Zofran 4mg disintegrating tablet three times daily for several days 
-I will send this to the pharmacy 3. Pain management 
-We talked about being really consistent with the pain medication 
-You have been taking the Oxycodone 30mg 2 tabs every 4 hours consistently which is about 12 tablets a day 
-You are almost out of this; we talked about setting aside 10 per day for maximum dosing  
-You are also taking Tylenol 500mg - we talked about taking no more than 6 tablets or 3000mg in 24 hours of Tylenol 
-I will write the scripts today for the Oxycodone 30mg; 150 pills for the next 15 days x 3 scripts 3/5/18; 3/20/18 
-Continue Oxycontin 60mg 2 tabs (120mg) every 8 hours for #180 per month; 138 left 
-You haven't used the Ketamine 2-3ml (10-15mg) recently, in about week -We talked about opioid safety and I prescribed Naloxone nasal spray in case of an emergency/accidental overdose 
-We will mail you more information on this 3. Left sided groin/scotal pain 
-Swelling and pain is better 
-You are using ice on the scrotum  
-The pain is better 
-The pressure in the rectum is still difficult and keeps you from getting up and around  
-You are up more during the day; a few hours at a time 
-Dr. Vignesh Capone and Dr. Steve Hurt both have emphasized getting up and around 4. Advance Care Planning 
-At this time you want to continue with the current treatment approach 
-We will talk more about this in an upcoming visit 
-We would like to review your Advance Directive which you completed with a  This is what you have shared with us about Advance Care Planning Advance Care Planning 3/6/2018 Patient's Healthcare Decision Maker is: Verbal statement (Legal Next of Kin remains as decision maker) Primary Decision Maker Name Mika Vickers Primary Decision Maker Phone Number [phone] Primary Decision Maker Relationship to Patient Spouse Confirm Advance Directive Yes, not on file Does the patient have other document types Power Akredo The Palliative Medicine Team is here to support you and your family. We will see you again in a few weeks - we discussed how important it is to call us if you are not feeling well after chemo so we can make adjustments If there are any concerns before that time, such as medication questions, worsening symptoms or a need to see a physician for an urgent or emergent situation; please call [phone] (COPE). A physician is also on call after our normal business hours of 8am to 5pm.  
 
In order to serve you better, please allow up to 48 hours for prescription refills to be processed. Certain medications may require more paperwork or a written prescription that you may need to  from the office.  We appreciate you letting us know of any refill requests as soon as possible. We also would like you to sign up for charity: water as well. Sincerely, Shaan Mccarthy MD and the Palliative Medicine Team 
 
OPIOID SAFETY HANDOUT You are being prescribed a type of pain medication called an \"opioid\". This medication can be very effective. However, if not taken correctly, it can also be dangerous. Thus, there are necessary precautions for your safety and the safety of others. Following these will allow us to continue to safely prescribe this medication for your pain. Bring your original medication bottles to EVERY visit with the medication you have left in 
them. We cannot give you a new prescription without verifying the previous prescription. Opioids can cause fatigue or sleepiness. **Do not take with other medications that make you sleepy. **Do not take with alcohol or other drugs. Opioids can cause constipation. **Take a combination stool softener/laxative while taking opioids. Only take your pain medication as prescribed on the bottle or instructions. Do not increase your pain medication dose without discussing it with your prescribing physician. If your pain becomes worse, please call Palliative Medicine at [phone] Do not use the Emergency Department for your pain unless it is a life-threatening situation. Your pain medication was prescribed only for you. It is both unsafe and illegal to share or sell your pain medication. Do not drive within 72 hours of a change in dose or the addition of a new medication. Your pain medication should be kept in a locked container in your house. Only take out the number of pills you need for 24 hours. Keep all opioids (and other medications) away from a child's reach.  
  
If you pill bottle is lost or stolen, we typically cannot write a new prescription for the same medication until the date it was supposed to be finished. In order to keep you safe, we access the [employer]. This allows us to make sure you are not prescribed a similar medication that could interact with the opioids we have prescribed. Additionally, every patient prescribed opioids for more than 3 months will be asked to give a urine sample for drug testing at least once. This is a necessary test as guided by the [facility] for patients who are prescribed opioid medications. To dispose of your unused medications safely visit: www.fda.gov and search \"unused medications. \" Please inform Palliative Medicine of any questions you may have about your pain medications by call [phone] We look forward to seeing you at the next office visit. Vandana Ferrara MD  
 
 
 
 
  
Learning About Naloxone for Opioid Overdose What is naloxone? Opioids are strong pain medicines. Examples include hydrocodone, oxycodone, fentanyl, and morphine. Heroin is an example of an illegal opioid. Taking too much of an opioid can cause death. An overdose is an emergency. Naloxone is a medicine that reverses the effects of an overdose. If you take it soon enough after an overdose, it can save your life. Naloxone comes in a rescue kit you can carry with you. You may hear it called a Narcan kit for short. The rescue kit may contain: · The medicine. · Syringes and needles. · A nasal adapter for the syringes. · A separate nasal spray device. Your doctor can give you a prescription for a rescue kit and show you how to use it. In some places you can buy Narcan kits without a prescription. When is naloxone used? Naloxone is used when a person shows signs of an opioid overdose. A person may have overdosed if he or she is: · Sleepy or hard to wake up. · Confused. · Not breathing normally. Make sure your family and friends know about these signs of an overdose. If someone appears to have overdosed, call 911. A drug overdose is an emergency. How do you use it? If you overdose, you may not be able to give yourself the medicine. So it's very important to teach friends and family how and when to give it to you. Rescue kits come with instructions. There are two ways to give the medicine. Many rescue kits can be used for either method. The methods are: · Injection with needle and syringe. ¨ Training may be needed in order to use this method correctly. ¨ Some rescue kits come with automatic injectors that don't require special training. ¨ The medicine can be injected through clothing. · Nasal spray. ¨ Many rescue kits come with a nasal adapter. It attaches to the syringe and turns the medicine into a mist. 
¨ The mist is sprayed into the nose of a person who has overdosed. The person needs to be lying down when the mist is sprayed. Overdose symptoms may return a few minutes after the first dose from the rescue kit. If that happens, a second dose is needed. Rescue kits come with two doses for that reason. Keep your rescue kit with you always. You never know when you might need it. If you think you or someone else may have overdosed but you're not sure, use the kit anyway. Aside from going through withdrawal, which may be uncomfortable, there is no downside to using this medicine. Always go to the emergency room after using naloxone. Doctors will want to make sure the overdose has been reversed. Follow-up care is a key part of your treatment and safety. Be sure to make and go to all appointments, and call your doctor if you are having problems. It's also a good idea to know your test results and keep a list of the medicines you take. Where can you learn more? Go to http://giancarlo-adelaide.info/.  
Enter S984 in the search box to learn more about \"Learning About Naloxone for Opioid Overdose. \" Current as of: November 3, 2016 Content Version: 11.4 © 8535-4011 [employer]. Care instructions adapted under license by [employer] (which disclaims liability or warranty for this information). If you have questions about a medical condition or this instruction, always ask your healthcare professional. [address] any warranty or liability for your use of this information. Patient Instructions History Introducing [facility]! Dear Eleni Brown: Thank you for requesting a [employer] account. Our records indicate that you already have an active [employer] account. You can access your account anytime at [email or web address]/[employer] Did you know that you can access your hospital and ER discharge instructions at any time in Ang[employer]Score? You can also review all of your test results from your hospital stay or ER visit. Additional Information If you have questions, please visit the Frequently Asked Questions section of the [employer] website at [email or web address] [email or web address] Remember, Ang[employer]Score is NOT to be used for urgent needs. For medical emergencies, dial 911. Now available from your iPhone and Android! Please provide this summary of care documentation to your next provider. Your primary care clinician is listed as Carlotta Espinal. If you have any questions after today's visit, please call [phone].

## 2018-03-06 NOTE — TELEPHONE ENCOUNTER
Patient is calling to advise that Dr. Ilda Jay left code off scripts. Patient took scripts to [facility] at [place], phone number  [phone]. Patient states that it is the   Oxycodone 30 mg. Please call to advise him when taken care of.

## 2018-03-06 NOTE — ACP (ADVANCE CARE PLANNING)
Pall Med    Began discussion of goals and values     Patient has Advance Directive and Living Will completed; will obtain copies for records; however this was done with  and they would like to review at follow up visit    Began discussion of resuscitation w/o decision made at this time; remains Full Code    Advance Care Planning 3/6/2018   Patient's Healthcare Decision Maker is: Verbal statement (Legal Next of Kin remains as decision maker)   Primary Decision Maker Name Marylou Martines   Primary Decision Maker Phone Number [phone]   Primary Decision Maker Relationship to Patient Spouse   Confirm Advance Directive Yes, not on file   Does the patient have other document types Power of [employer]

## 2018-03-06 NOTE — TELEPHONE ENCOUNTER
Spoke with Thee Bennett at the [facility] in [place] [phone] regarding oxycodone prescription to verify  which was verified as 1960 and to give her the diagnosis code G89.3. She will now fill this prescription for Mr. Polk.

## 2018-03-06 NOTE — TELEPHONE ENCOUNTER
PM nurse left a voice mail for Mr. Gricelda Miller to let him know that the pharmacy had been contacted and the proper information has been giving to Hilmar including the correct birthday and the necessary diagnosis code. Instructed him to call back with any further questions.

## 2018-03-20 NOTE — PROGRESS NOTES
Spoke with Mika Vickers, patients wife and she states that having a home visit with Dr. Barrington Casey of Thursday will work for her and Alcira Juli Bell. 1540-spoke with Mika Vickers to let her know Dr. Barrington Casey would come to see Dr. Juli Bell on Thursday but a time had not been set as of yet. Will call her when a plan was in place. She was agreeable to this.     Silvia Duarte RN, OCN  Palliative Medicine

## 2018-03-21 NOTE — PROGRESS NOTES
Palliative Medicine  Nursing Note  [phone]  Fax [phone]        Telephone Call  Patient Name: Yang Victoria  YOB: 1960    3/21/2018      Advance Care Planning 3/6/2018   Patient's Healthcare Decision Maker is: Verbal statement (Legal Next of Kin remains as decision maker)   Primary Decision Maker Name Randy Grier   Primary Decision Maker Phone Number [phone]   Primary Decision Maker Relationship to Patient Spouse   Confirm Advance Directive Yes, not on file   Does the patient have other document types Power of        Spoke to Pincus Saint about appointment tomorrow that is set up for 2:00 pm.  She had been told and is agreeable to that time.         Francis Mclean, BSN, RN, OCN, [facility]  Palliative Medicine

## 2018-03-22 NOTE — PATIENT INSTRUCTIONS
Dear Desiree Pang ,    It was a pleasure seeing you at home today. This is what we talked about:     1. Disease / Esophageal cancer with liver lesions + retroperitoneal fibrosis  -The January chemotherapy was very difficult  -You restarted chemotherapy on 2/21/18 at reduced dose and this was still hard and then 3/13/18 which was better tolerated; you are due again 3/27/18  -The functional status that is \"better\" is being able to get out of bed and come downstairs  -You were able to sit outside on Sunday but this is the only day you really have been outside  -You are able to do more when the pain is less  -You would like to continue chemo as you feel its helping pain and swelling and actually are interested in increasing the dose back to 100%  -You are still having leg and scrotal swelling but feel its improving  -You have the machine to help with lymphedema    2. Stomach issues  -This is better with the scheduled Zofran   -Campostar is the drug that is contributing to this  -Your bowel is loud at times and this is really bothersome  -We will check with the ostomy nurse to see if there is any way to cut this down    3. Nephrostomy sites  -This are getting irritated with the friction of the tubes  -Part of the issue may be lying in bed more and the pressure on the back  -Domonique redressed these today with padding on the tube sites  -You can change these about every 72 hours    4. Pain management  -We talked about being really consistent with the pain medication  -You have been doing this better!  -You have been taking the Oxycodone 30mg 2 tabs every 4 hours consistently which is about 12 tablets a day (#124 left); I will send you these scripts  -Continue Oxycontin 60mg 2 tabs (120mg) every 8 hours for #180 per month (you have some left but cannot find this travel bottle); I rewrote this script today - you ended up filling a bridge script and then obtaining another 30 day script.  Please let me know if you find the original travel bottle. -You are also taking Tylenol 500mg - we talked about taking no more than 6 tablets or 3000mg in 24 hours of Tylenol  -You haven't used the Ketamine 2-3ml (10-15mg) recently  -We talked about opioid safety and I prescribed Naloxone nasal spray in case of an emergency/accidental overdose  -We will make sure this gets to the pharmacy    5. Mood  -You have had a few days where your mood is irritated or frustrated  -You do not feel hopeless or depressed  -Sitting outside in the sun is so good for you  -I emphasized if these feelings continue and start to become more frequent to please let us know    6. Advance Care Planning  -At this time you want to continue with the current treatment approach  -We reviewed your values/goals - you would not want to live as a vegetable  -Your Advance Directive is consistent with your values and goals as you expressed today  -We talked about resuscitation  -You elected to remain as a full code status at this time  -We fully support you in this decision     This is what you have shared with us about Alban Thomas. Planning 3/23/2018   Patient's Healthcare Decision Maker is: Named in scanned ACP document   Primary Decision Maker Name Isabela Bell   Primary Decision Maker Phone Number [phone]   Primary Decision Maker Relationship to Patient Spouse   Confirm Advance Directive Yes, on file   Does the patient have other document types Power of      The Palliative Medicine Team is here to support you and your family. We will see you again in a few weeks - we discussed how important it is to call us if you are not feeling well after chemo so we can make adjustments    If there are any concerns before that time, such as medication questions, worsening symptoms or a need to see a physician for an urgent or emergent situation; please call [phone] (COPE).  A physician is also on call after our normal business hours of 8am to 5pm. In order to serve you better, please allow up to 48 hours for prescription refills to be processed. Certain medications may require more paperwork or a written prescription that you may need to  from the office. We appreciate you letting us know of any refill requests as soon as possible. We also would like you to sign up for [employer] as well.     Sincerely,      Minesh Dalal MD and the Palliative Medicine Team        Minesh Dalal MD

## 2018-03-22 NOTE — PROGRESS NOTES
[facility]  Palliative Medicine Office  Nursing Note  [phone]  Fax [phone]    Patient Name: Chester Garsia  YOB: 1960      3/22/2018    Home visit made with Dr. Pravin Reece see her note for complete assessment. Pill count done and reviewed meds and he understands and is having good pain control with the current pain meds. Looked at nephrostomy tubes some redness noted and drainage appears to be irritant from the tube new dressing placed Allevyn which will stabilize the tube. Using neosporin on the area wife will change in 2 days and assess to see if area is improved will call palliative if area worsens or more drainage. Will continue to assess and jony Power, RN Nurse home visit   Palliative Medicine        No future appointments.

## 2018-03-22 NOTE — MR AVS SNAPSHOT
[address] [facility] [address] [place] [address] 
[phone] Patient: Zahraa Allan MRN: [id number] KWK:[id number] Visit Information Date & Time Provider Department Dept. Phone Encounter #  
 3/22/2018  2:00 PM Vicente Nissen, [place]LEY 22 [id number] Upcoming Health Maintenance Date Due Hepatitis C Screening 1960 DTaP/Tdap/Td series (1 - Tdap) 5/10/1980 FOBT Q 1 YEAR AGE 50-75 5/10/2009 Pneumococcal 19-64 Highest Risk (1 of 3 - PCV13) 11/14/2018* Influenza Age 5 to Adult 11/14/2018* *Topic was postponed. The date shown is not the original due date. Allergies as of 3/22/2018  Review Complete On: 2/16/2018 By: Vicente Nissen, MD  
  
 Severity Noted Reaction Type Reactions Levaquin [Levofloxacin]  04/08/2011    Nausea and Vomiting Xanax [Alprazolam] Low 01/25/2018   Side Effect Other (comments) Possible delirium Current Immunizations  Never Reviewed No immunizations on file. Not reviewed this visit You Were Diagnosed With   
  
 Codes Comments Cancer associated pain     ICD-10-CM: G89.3 ICD-9-CM: 338.3 Scrotal pain     ICD-10-CM: N50.82 ICD-9-CM: 608.9 Back pain, unspecified back location, unspecified back pain laterality, unspecified chronicity     ICD-10-CM: M54.9 ICD-9-CM: 724.5 Vitals Smoking Status Never Smoker Preferred Pharmacy Pharmacy Name Phone [id number] [address] [facility] [phone] Your Updated Medication List  
  
   
This list is accurate as of 3/22/18 11:59 PM.  Always use your most recent med list.  
  
  
  
  
 dicyclomine 10 mg capsule Commonly known as:  BENTYL Take 1 Cap by mouth every four (4) hours as needed. ELIQUIS 5 mg tablet Generic drug:  apixaban Take 1 Tab by mouth two (2) times a day. haloperidol 2 mg/mL oral concentrate Commonly known as:  HALDOL Take 1 mL by mouth every six (6) hours as needed. Indications: In case of Ketamine reaction  
  
 ketamine 5 mg/1 mL Soln 5 mg/mL oral solution (compounded) Commonly known as:  KETALAR Take 3 mL by mouth every six (6) hours. LOMOTIL 2.5-0.025 mg per tablet Generic drug:  diphenoxylate-atropine Take 1 Tab by mouth four (4) times daily as needed for Diarrhea. ondansetron 4 mg disintegrating tablet Commonly known as:  ZOFRAN ODT Take 1 Tab by mouth every six (6) hours as needed for Nausea for up to 30 days. * oxyCODONE 60 mg Tr12 Take 120 mg by mouth every eight (8) hours for 30 days. Max Daily Amount: 360 mg. Indications: SEVERE PAIN WITH OPIOID TOLERANCE Start taking on:  3/26/2018 * oxyCODONE IR 30 mg immediate release tablet Commonly known as:  Cliffton Boatman Take 2 Tabs by mouth every four (4) hours as needed for up to 15 days. Max Daily Amount: 360 mg. Up to 10 pills in 24 hours Start taking on:  5/4/2018  
  
 senna-docusate 8.6-50 mg per tablet Commonly known as:  Jovana Seals Take 2 Tabs by mouth two (2) times a day for 120 days. * Notice: This list has 2 medication(s) that are the same as other medications prescribed for you. Read the directions carefully, and ask your doctor or other care provider to review them with you. Prescriptions Printed Refills  
 oxyCODONE IR (ROXICODONE) 30 mg immediate release tablet 0 Starting on: 5/4/2018 Sig: Take 2 Tabs by mouth every four (4) hours as needed for up to 15 days. Max Daily Amount: 360 mg. Up to 10 pills in 24 hours Class: Print Route: Oral  
 oxyCODONE 60 mg TR12 0 Starting on: 3/26/2018 Sig: Take 120 mg by mouth every eight (8) hours for 30 days. Max Daily Amount: 360 mg. Indications: SEVERE PAIN WITH OPIOID TOLERANCE Class: Print Route: Oral  
  
Patient Instructions Kallie Hodges 
 
 It was a pleasure seeing you at home today. This is what we talked about: 1. 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 
-The January chemotherapy was very difficult 
-You restarted chemotherapy on 2/21/18 at reduced dose and this was still hard and then 3/13/18 which was better tolerated; you are due again 3/27/18 
-The functional status that is \"better\" is being able to get out of bed and come downstairs 
-You were able to sit outside on Sunday but this is the only day you really have been outside 
-You are able to do more when the pain is less 
-You would like to continue chemo as you feel its helping pain and swelling 
-You are still having leg and scrotal swelling but feel its improving 
-You have the machine to help with lymphedema 2. Stomach issues 
-This is better with the schedule of Zofran  
-Campostar is the drug that is contributing to this 
-Bowel is loud at times and this really bothers her 
-Bowel is making a lot of noises (gases/air noises) -We will check with the ostomy nurse to see if there is any way to cut this down 3. Nephrostomy sites 
-This are getting irritated with the friction of the tubes 
-Domonique redressed these today with padding on the tube sites 
-Part of the issue may be lying in bed more 3. Pain management 
-We talked about being really consistent with the pain medication 
-You have been doing this better! 
-You have been taking the Oxycodone 30mg 2 tabs every 4 hours consistently which is about 12 tablets a day (#124 left); I will send you these scripts 
-Continue Oxycontin 60mg 2 tabs (120mg) every 8 hours for #180 per month (you have some left but cannot find this travel bottle); I rewrote this script today 
-You are also taking Tylenol 500mg - we talked about taking no more than 6 tablets or 3000mg in 24 hours of Tylenol 
-You haven't used the Ketamine 2-3ml (10-15mg) recently -We talked about opioid safety and I prescribed Naloxone nasal spray in case of an emergency/accidental overdose 
-We will mail you more information on this 
-We will make sure this gets to the pharmacy 4. Left sided groin/scotal pain 
-Swelling and pain is better 
-You are using ice on the suprapubic area and scrotum  
-The pressure in the rectum is still difficult and keeps you from getting up and around  
-Dr. Jenni Foster and Dr. Gerard Sun both have emphasized getting up and around 5. Mood 
-You have had a few days where your mood is irritated or frustrated 
-You do not feel hopeless or depressed 
-Sitting outside in the sun is so good for you 6. Advance Care Planning 
-At this time you want to continue with the current treatment approach 
-We will talk more about this in an upcoming visit 
-We would like to review your Advance Directive which you completed with a  This is what you have shared with us about Advance Care Planning Advance Care Planning 3/6/2018 Patient's Healthcare Decision Maker is: Verbal statement (Legal Next of Kin remains as decision maker) Primary Decision Maker Name Nic Juares Primary Decision Maker Phone Number [phone] Primary Decision Maker Relationship to Patient Spouse Confirm Advance Directive Yes, not on file Does the patient have other document types Power of [employer] The Palliative Medicine Team is here to support you and your family. We will see you again in a few weeks - we discussed how important it is to call us if you are not feeling well after chemo so we can make adjustments If there are any concerns before that time, such as medication questions, worsening symptoms or a need to see a physician for an urgent or emergent situation; please call [phone] (DIANE).  A physician is also on call after our normal business hours of 8am to 5pm.  
 
In order to serve you better, please allow up to 48 hours for prescription refills to be processed. Certain medications may require more paperwork or a written prescription that you may need to  from the office. We appreciate you letting us know of any refill requests as soon as possible. We also would like you to sign up for [employer] as well. Sincerely, Fe Bates MD and the Palliative Medicine Team 
 
OPIOID SAFETY HANDOUT You are being prescribed a type of pain medication called an \"opioid\". This medication can be very effective. However, if not taken correctly, it can also be dangerous. Thus, there are necessary precautions for your safety and the safety of others. Following these will allow us to continue to safely prescribe this medication for your pain. Bring your original medication bottles to EVERY visit with the medication you have left in 
them. We cannot give you a new prescription without verifying the previous prescription. Opioids can cause fatigue or sleepiness. **Do not take with other medications that make you sleepy. **Do not take with alcohol or other drugs. Opioids can cause constipation. **Take a combination stool softener/laxative while taking opioids. Only take your pain medication as prescribed on the bottle or instructions. Do not increase your pain medication dose without discussing it with your prescribing physician. If your pain becomes worse, please call Palliative Medicine at [phone]). Do not use the Emergency Department for your pain unless it is a life-threatening situation. Your pain medication was prescribed only for you. It is both unsafe and illegal to share or sell your pain medication. Do not drive within 72 hours of a change in dose or the addition of a new medication. Your pain medication should be kept in a locked container in your house. Only take out the number of pills you need for 24 hours. Keep all opioids (and other medications) away from a child's reach. If you pill bottle is lost or stolen, we typically cannot write a new prescription for the same medication until the date it was supposed to be finished. In order to keep you safe, we access the [employer]. This allows us to make sure you are not prescribed a similar medication that could interact with the opioids we have prescribed. Additionally, every patient prescribed opioids for more than 3 months will be asked to give a urine sample for drug testing at least once. This is a necessary test as guided by the [facility] for patients who are prescribed opioid medications. To dispose of your unused medications safely visit: www.fda.gov and search \"unused medications. \" Please inform Palliative Medicine of any questions you may have about your pain medications by call [phone] COPE [phone]). We look forward to seeing you at the next office visit. Fabby Cho MD  
 
 
 
 
  
Learning About Naloxone for Opioid Overdose What is naloxone? Opioids are strong pain medicines. Examples include hydrocodone, oxycodone, fentanyl, and morphine. Heroin is an example of an illegal opioid. Taking too much of an opioid can cause death. An overdose is an emergency. Naloxone is a medicine that reverses the effects of an overdose. If you take it soon enough after an overdose, it can save your life. Naloxone comes in a rescue kit you can carry with you. You may hear it called a Narcan kit for short. The rescue kit may contain: · The medicine. · Syringes and needles. · A nasal adapter for the syringes. · A separate nasal spray device. Your doctor can give you a prescription for a rescue kit and show you how to use it. In some places you can buy Narcan kits without a prescription. When is naloxone used? Naloxone is used when a person shows signs of an opioid overdose.  A person may have overdosed if he or she is: · Sleepy or hard to wake up. · Confused. · Not breathing normally. Make sure your family and friends know about these signs of an overdose. If someone appears to have overdosed, call 911. A drug overdose is an emergency. How do you use it? If you overdose, you may not be able to give yourself the medicine. So it's very important to teach friends and family how and when to give it to you. Rescue kits come with instructions. There are two ways to give the medicine. Many rescue kits can be used for either method. The methods are: · Injection with needle and syringe. ¨ Training may be needed in order to use this method correctly. ¨ Some rescue kits come with automatic injectors that don't require special training. ¨ The medicine can be injected through clothing. · Nasal spray. ¨ Many rescue kits come with a nasal adapter. It attaches to the syringe and turns the medicine into a mist. 
¨ The mist is sprayed into the nose of a person who has overdosed. The person needs to be lying down when the mist is sprayed. Overdose symptoms may return a few minutes after the first dose from the rescue kit. If that happens, a second dose is needed. Rescue kits come with two doses for that reason. Keep your rescue kit with you always. You never know when you might need it. If you think you or someone else may have overdosed but you're not sure, use the kit anyway. Aside from going through withdrawal, which may be uncomfortable, there is no downside to using this medicine. Always go to the emergency room after using naloxone. Doctors will want to make sure the overdose has been reversed. Follow-up care is a key part of your treatment and safety. Be sure to make and go to all appointments, and call your doctor if you are having problems. It's also a good idea to know your test results and keep a list of the medicines you take. Where can you learn more? Go to http://giancarlo-adelaide.info/. Enter U196 in the search box to learn more about \"Learning About Naloxone for Opioid Overdose. \" Current as of: November 3, 2016 Content Version: 11.4 © 3253-4183 [employer]. Care instructions adapted under license by [employer] (which disclaims liability or warranty for this information). If you have questions about a medical condition or this instruction, always ask your healthcare professional. [address] any warranty or liability for your use of this information. Introducing [facility]! Dear So Bro: Thank you for requesting a [employer] account. Our records indicate that you already have an active [employer] account. You can access your account anytime at [email or web address] Did you know that you can access your hospital and ER discharge instructions at any time in [employer]? You can also review all of your test results from your hospital stay or ER visit. Additional Information If you have questions, please visit the Frequently Asked Questions section of the [employer] website at [email or web address] Remember, [employer] is NOT to be used for urgent needs. For medical emergencies, dial 911. Now available from your iPhone and Android! Please provide this summary of care documentation to your next provider. Your primary care clinician is listed as Jenni Baldwin. If you have any questions after today's visit, please call [phone].

## 2018-03-22 NOTE — PROGRESS NOTES
Palliative Medicine Outpatient Services  [employer]: [phone]    Patient Name: Yohan Clark  YOB: 1960    Date of Current Visit: 3/22/18   Location of Current Visit:    [x] HOME    Date of Initial Visit: 11/8/17  Requesting Physician: Dr. Deanna Bravo  Primary Care Physician: Carlotta Espinal MD      SUMMARY:   Yohan Clark is a 61y.o. year old with a past history of polyfibrosis syndrome (see below; contractures, keloids, retroperitoneal fibrosis) and esophageal cancer with liver mets diagnosed in 2016 now s/p bilateral nephrostomy tubes and ileostomy as well as DVT now with extensive lower extremity swelling, who was referred to Palliative Medicine by Jesus Hernandes for unmet palliative care needs. The patients social history includes (see below). Palliative Medicine is addressing the following current patient/family concerns: intensity of life changing illness/procedures, pain and caregiver support. PALLIATIVE DIAGNOSES:       ICD-10-CM ICD-9-CM    1. Cancer associated pain G89.3 338.3 oxyCODONE IR (ROXICODONE) 30 mg immediate release tablet      DISCONTINUED: oxyCODONE IR (ROXICODONE) 30 mg immediate release tablet      DISCONTINUED: oxyCODONE IR (ROXICODONE) 30 mg immediate release tablet      DISCONTINUED: oxyCODONE 60 mg TR12   2. Scrotal pain N50.82 608.9 oxyCODONE IR (ROXICODONE) 30 mg immediate release tablet      DISCONTINUED: oxyCODONE IR (ROXICODONE) 30 mg immediate release tablet      DISCONTINUED: oxyCODONE IR (ROXICODONE) 30 mg immediate release tablet      DISCONTINUED: oxyCODONE 60 mg TR12   3.  Back pain, unspecified back location, unspecified back pain laterality, unspecified chronicity M54.9 724.5 oxyCODONE IR (ROXICODONE) 30 mg immediate release tablet      DISCONTINUED: oxyCODONE IR (ROXICODONE) 30 mg immediate release tablet      DISCONTINUED: oxyCODONE IR (ROXICODONE) 30 mg immediate release tablet      DISCONTINUED: oxyCODONE 60 mg TR12      PLAN:     Patient Instructions     Dear Gerri Bowers ,    It was a pleasure seeing you at home today. This is what we talked about:     1. Disease / Esophageal cancer with liver lesions + retroperitoneal fibrosis  -The January chemotherapy was very difficult  -You restarted chemotherapy on 2/21/18 at reduced dose and this was still hard and then 3/13/18 which was better tolerated; you are due again 3/27/18  -The functional status that is \"better\" is being able to get out of bed and come downstairs  -You were able to sit outside on Sunday but this is the only day you really have been outside  -You are able to do more when the pain is less  -You would like to continue chemo as you feel its helping pain and swelling and actually are interested in increasing the dose back to 100%  -You are still having leg and scrotal swelling but feel its improving  -You have the machine to help with lymphedema    2. Stomach issues  -This is better with the scheduled Zofran   -Campostar is the drug that is contributing to this  -Your bowel is loud at times and this is really bothersome  -We will check with the ostomy nurse to see if there is any way to cut this down    3. Nephrostomy sites  -This are getting irritated with the friction of the tubes  -Part of the issue may be lying in bed more and the pressure on the back  -Domonique redressed these today with padding on the tube sites  -You can change these about every 72 hours    4. Pain management  -We talked about being really consistent with the pain medication  -You have been doing this better!  -You have been taking the Oxycodone 30mg 2 tabs every 4 hours consistently which is about 12 tablets a day (#124 left); I will send you these scripts  -Continue Oxycontin 60mg 2 tabs (120mg) every 8 hours for #180 per month (you have some left but cannot find this travel bottle); I rewrote this script today - you ended up filling a bridge script and then obtaining another 30 day script.  Please let me know if you find the original travel bottle. -You are also taking Tylenol 500mg - we talked about taking no more than 6 tablets or 3000mg in 24 hours of Tylenol  -You haven't used the Ketamine 2-3ml (10-15mg) recently  -We talked about opioid safety and I prescribed Naloxone nasal spray in case of an emergency/accidental overdose  -We will make sure this gets to the pharmacy    5. Mood  -You have had a few days where your mood is irritated or frustrated  -You do not feel hopeless or depressed  -Sitting outside in the sun is so good for you  -I emphasized if these feelings continue and start to become more frequent to please let us know    6. Advance Care Planning  -At this time you want to continue with the current treatment approach  -We reviewed your values/goals - you would not want to live as a vegetable  -Your Advance Directive is consistent with your values and goals as you expressed today  -We talked about resuscitation  -You elected to remain as a full code status at this time  -We fully support you in this decision     This is what you have shared with us about Alban Thomas. Planning 3/23/2018   Patient's Healthcare Decision Maker is: Named in scanned ACP document   Primary Decision Maker Name Nelly Nayak   Primary Decision Maker Phone Number [phone]   Primary Decision Maker Relationship to Patient Spouse   Confirm Advance Directive Yes, on file   Does the patient have other document types Power of      The Palliative Medicine Team is here to support you and your family. We will see you again in a few weeks - we discussed how important it is to call us if you are not feeling well after chemo so we can make adjustments    If there are any concerns before that time, such as medication questions, worsening symptoms or a need to see a physician for an urgent or emergent situation; please call [phone] ([phone]).  A physician is also on call after our normal business hours of 8am to 5pm.     In order to serve you better, please allow up to 48 hours for prescription refills to be processed. Certain medications may require more paperwork or a written prescription that you may need to  from the office. We appreciate you letting us know of any refill requests as soon as possible. We also would like you to sign up for [facility] as well. Sincerely,      Joi Marques MD and the Palliative Medicine Team        Joi Marques MD            GOALS OF CARE / TREATMENT PREFERENCES:   [====Goals of Care====]  GOALS OF CARE:  Patient / health care proxy stated goals: I've been in pain for so long, I'd like to keep it manageable    TREATMENT PREFERENCES:   Code Status:  [x] Attempt Resuscitation       [] Do Not Attempt Resuscitation    Advance Care Planning:  Advance Care Planning 3/23/2018   Patient's Healthcare Decision Maker is: Named in scanned ACP document   Primary Decision Maker Name Laurie Gray   Primary Decision Maker Phone Number [phone]   Primary Decision Maker Relationship to Patient Spouse   Confirm Advance Directive Yes, on file   Does the patient have other document types Power of      The palliative care team has discussed with patient / health care proxy about goals of care / treatment preferences for patient.  [====Goals of Care====]     PRESCRIPTIONS GIVEN:     Medications Ordered Today   Medications    DISCONTD: oxyCODONE IR (ROXICODONE) 30 mg immediate release tablet     Sig: Take 2 Tabs by mouth every four (4) hours as needed for up to 15 days. Max Daily Amount: 360 mg. Up to 10 pills in 24 hours     Dispense:  150 Tab     Refill:  0    DISCONTD: oxyCODONE IR (ROXICODONE) 30 mg immediate release tablet     Sig: Take 2 Tabs by mouth every four (4) hours as needed for up to 15 days. Max Daily Amount: 360 mg.  Up to 10 pills in 24 hours     Dispense:  150 Tab     Refill:  0    oxyCODONE IR (ROXICODONE) 30 mg immediate release tablet     Sig: Take 2 Tabs by mouth every four (4) hours as needed for up to 15 days. Max Daily Amount: 360 mg. Up to 10 pills in 24 hours     Dispense:  150 Tab     Refill:  0    DISCONTD: oxyCODONE 60 mg TR12     Sig: Take 120 mg by mouth every eight (8) hours for 30 days. Max Daily Amount: 360 mg. Indications: SEVERE PAIN WITH OPIOID TOLERANCE     Dispense:  180 Each     Refill:  0       FOLLOW UP:   2 weeks    No future appointments. PHYSICIANS INVOLVED IN CARE:   Patient Care Team:  Deandre Zuniga MD as PCP - General (Family Practice)  Maxine Olivier MD as Physician (Hematology)  Soheila Goodwin MD as Physician (Palliative Medicine)    Urology: Dr. Curt Augustin  - about every 3 months; leaking - changed out  Renal Dr. Thony Gilliland at [facility]     HISTORY:   Nursing documentation from date of visit reviewed. Reviewed patient-completed ESAS and advance care planning form. Reviewed patient record in prescription monitoring program.    CHIEF COMPLAINT: Pain and swelling    HPI/SUBJECTIVE:    The patient is: [x] Verbal / [] Nonverbal     Patient still having a lot of pain and swelling  More consistent in taking pain medications  Feels he really hasn't been out of bed much  Is rarely downstairs, though had a good few days  Limited by energy and functional ability    Now on 5-FU and Camptosar/Irinotecan as well as Herceptin  Camptosar can cause diarrhea    From Previous Visits:    Born in [facility]  2 brothers and a sister; father ; mother is 80     2 daughters; [employer] kwame; another [address] [facility]  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 2009; 24(2): 839417.   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 Its precise pathophysiology or etiology remains unclear. In addition to distinctive diverse skin manifestations, patients with polyfibromatosis have been previously reported to show erosive arthropathy with significant limitation of movement at affected joints. However, the presence of erosive polyarthropathy in polyfibromatosis has not emphasized in previous cases. Here, we report a case of polyfibromatosis syndrome combined with painless massive structural destruction of hand and foot joints, and review the characteristics of erosive arthropathy in previous cases. Metastatic esophageal cancer diagnosed 9/1/16  Had Chemotherapy and did well; responded  Dr. Adan Chao at [facility] follows him along with Dr. Diamond Leija / Nate Archer  May 2017 - creatinine level elevation  June 2017 - bilateral stents; right first and then left  August 2017 -stents didn't work so had nephrostomy tubes at [facility]   September 2017 -  started having bowel obstruction; small and large bowel  September 2 to [facility] and then September 12 to [facility] until September 27; then 3 days stay in [facility]  They found an inflammatory process throughout pelvis  When performed ileostomy had ascites; malignant ascites  Restarted chemotherapy; 5-FU; Oxaliplatin (itching/rash);  Herceptin  They are replacing Oxaliplatin with a different medication    Life has changed dramatically  Not happy with all the tubes; not easy to do anything   Pain is constant; rectal pain  Pain and discharge from the rectal area; slowly improving  Feels like has something lodged in rectum  Annoying sensation  Improved significantly; slow improvement but is improved  Was in very severe pain in rectum    He isn't where he wants to be  It is still an effort to do anything  Getting dressed with all the tubes is difficult  Can't really work in the yard  [address] can be restrictive  Old overalls; doesn't restrict    Diagnosed with 2 DVTs; severe swelling in both legs  Currently on diuretic and Eloquis  Left knee is hard to bend; without pain and can only bend a particular amount    He has been on medications for chronic pain  But his entire life; he had had keloids that cause pain - burning and sharp severe pain  Found a doctor in [place]; chemo and steroid injections; pulse dye laser   Name Dr. Estela Rodriguez all the neuropathic medications  Contractures in his 35s of his hands  Surgeries on hands; Dr. Shameka Cain  He has been on opioids chronically    Current pain regimen:  Oxycontin 100mg (80 + 20) every 8 hours  Oxy IR 30mg  - 5-6/day regular  Tried Xtampza ER but did not notice any improvement; went back to Oxycontin     FUNCTIONAL ASSESSMENT:     Palliative Performance Scale (PPS):   PPS: 70     PSYCHOSOCIAL/SPIRITUAL SCREENING:     Any spiritual / Islam concerns:  [] Yes /  [x] No    Caregiver Burnout:  [] Yes /  [x] No /  [] No Caregiver Present      Anticipatory grief assessment:   [x] Normal  / [] Maladaptive       ESAS Anxiety: Anxiety: 0    ESAS Depression: Depression: 2     REVIEW OF SYSTEMS:     The following systems were [x] reviewed / [] unable to be reviewed  Systems: constitutional, ears/nose/mouth/throat, respiratory, gastrointestinal, genitourinary, musculoskeletal, integumentary, neurologic, psychiatric, endocrine. Positive findings noted below.   Modified ESAS Completed by: provider   Fatigue: 6 Drowsiness: 2   Depression: 2 Pain: 6   Anxiety: 0 Nausea: 2   Anorexia: 2 Dyspnea: 0   Best Well-Bein Constipation: No            PHYSICAL EXAM:     Wt Readings from Last 3 Encounters:   17 182 lb 9.6 oz (82.8 kg)   17 182 lb 9.6 oz (82.8 kg)   11 209 lb 14.1 oz (95.2 kg)     Blood pressure 124/80, pulse (!) 56, SpO2 98 %. - See nursing note, VS completed and reviewed and did not cross over into note  Last bowel movement: See Nursing Note    Constitutional: alert, oriented, fatigued, has lost weight by observation  Eyes: pupils equal, anicteric  ENMT: no nasal discharge, moist mucous membranes  Cardiovascular:   Respiratory: breathing not labored, symmetric  Gastrointestinal: soft non-tender, ileostomy right lower quadrant, bilateral nephrostomy tubes in flank region; both starting to cause irritation/ulceration at site of tube insertion  : brawny edema of scrotum/hard areas due to tense edema, no LAD in groin area, no bladder mass palpable   Musculoskeletal: no deformity, no tenderness to palpation, edema bilateral lower extremities through to thighs (right > left)  Skin: warm, dry  Neurologic: following commands, moving all extremities, contractures of hand bilaterally (prior surgical releases)  Psychiatric: full affect, no hallucinations  Other:     HISTORY:     Past Medical History:   Diagnosis Date    Other ill-defined conditions(942.71)     polyfibromitosis      Past Surgical History:   Procedure Laterality Date    HX ORTHOPAEDIC      hand surgery      No family history on file. History reviewed, no pertinent family history. Social History   Substance Use Topics    Smoking status: Never Smoker    Smokeless tobacco: Never Used    Alcohol use No     Allergies   Allergen Reactions    Levaquin [Levofloxacin] Nausea and Vomiting    Xanax [Alprazolam] Other (comments)     Possible delirium      Current Outpatient Prescriptions   Medication Sig    [START ON 3/26/2018] oxyCODONE 60 mg TR12 Take 120 mg by mouth every eight (8) hours for 30 days. Max Daily Amount: 360 mg.  Indications: SEVERE PAIN WITH OPIOID TOLERANCE    [START ON 5/4/2018] oxyCODONE IR (ROXICODONE) 30 mg immediate release tablet Take 2 Tabs by mouth every four (4) hours as needed for up to 15 days. Max Daily Amount: 360 mg. Up to 10 pills in 24 hours    diphenoxylate-atropine (LOMOTIL) 2.5-0.025 mg per tablet Take 1 Tab by mouth four (4) times daily as needed for Diarrhea.  ondansetron (ZOFRAN ODT) 4 mg disintegrating tablet Take 1 Tab by mouth every six (6) hours as needed for Nausea for up to 30 days.  dicyclomine (BENTYL) 10 mg capsule Take 1 Cap by mouth every four (4) hours as needed.  senna-docusate (PERICOLACE) 8.6-50 mg per tablet Take 2 Tabs by mouth two (2) times a day for 120 days.  ELIQUIS 5 mg tablet Take 1 Tab by mouth two (2) times a day.  ketamine (KETALAR) 5 mg/1 mL soln 5 mg/mL oral solution (compounded) Take 3 mL by mouth every six (6) hours.  haloperidol (HALDOL) 2 mg/mL oral concentrate Take 1 mL by mouth every six (6) hours as needed. Indications: In case of Ketamine reaction     No current facility-administered medications for this visit. LAB DATA REVIEWED:   12/13/17  See Media;  Reviewed blood work done by [employer] / Dr. Jerline Cabot and CT done by [facility] (Creat 1.63 and Liver lesions smaller on CT)       CONTROLLED SUBSTANCES SAFETY ASSESSMENT (IF ON CONTROLLED SUBSTANCES):   Not currently prescribing patient's opioid medications    Reviewed opioid safety handout:  [x] Yes   [] No  24 hour opioid dose >150mg morphine equivalent/day:  [x] Yes   [] No  Benzodiazepines:  [] Yes   [x] No  Sleep apnea:  [] Yes   [x] No  Urine Toxicology Testing within last 6 months:  [] Yes   [x] No  History of or new aberrant medication taking behaviors:  [] Yes   [x] No          Total time: 60min  Counseling / coordination time: min  > 50% counseling / coordination?:

## 2018-03-22 NOTE — TELEPHONE ENCOUNTER
Returned call to [facility] pharmacy and spoke with Anthony Huynh. Mr. Hong Lane would like to fill his prescription for Oxycontin 60mg take 2 tabs every 8 hours #180, today. Reviewed Dr. Eduard Felty progress note from home visit today and she has approved filling this prescription a few days early. The note states that he has some left, but can't find some that he put in a travel bottle. Sallie verbalized understanding.     Miracle Barrios RN  Palliative Medicine

## 2018-03-23 NOTE — TELEPHONE ENCOUNTER
Patient is calling to see if nurse has started to work on Prior Authorization needed for his pain medication Oxycontin. Please call patient to provide update.

## 2018-03-23 NOTE — ACP (ADVANCE CARE PLANNING)
Advance Care Planning (ACP) Provider Conversation Snapshot    Date of ACP Conversation: 03/23/18  Persons included in Conversation:  patient and POA  Length of ACP Conversation in minutes:  30 minutes    Authorized Decision Maker (if patient is incapable of making informed decisions):    This person is:   Healthcare Agent/Medical Power of  under Advance Directive          For Patients with Decision Making Capacity:   Values/Goals: Exploration of values, goals, and preferences if recovery is not expected, even with continued medical treatment in the event of:  Imminent death    Conversation Outcomes / Follow-Up Plan:   Reviewed existing Advance Directive      Advance Care Planning 3/23/2018   Patient's Healthcare Decision Maker is: Named in scanned ACP document   Primary Decision Maker Name Barrington Craft   Primary Decision Maker Phone Number [phone]   Primary Decision Maker Relationship to Patient Spouse   Confirm Advance Directive Yes, on file   Does the patient have other document types Power of [employer]

## 2018-03-23 NOTE — TELEPHONE ENCOUNTER
Myles call and spoke with Judi Felder pharmacist [facility] pharmacy she is aware of rx early refill, Return call to patient who is aware that he can have an early refill to pay for out of pocket, his sister in law will take rx to pharmacy for fill, his wife is at a dr appointment.

## 2018-03-23 NOTE — TELEPHONE ENCOUNTER
Patient is calling regarding the Oxycontin. Patient states that even if he pays out of pocket, pharmacy is telling him they cannot give it to him. Please call to advise what he should do.

## 2018-03-23 NOTE — PROGRESS NOTES
[facility]  Palliative Medicine Office  Nursing Note  [phone]  Fax [phone]    Patient Name: Mihir Arriaga  YOB: 1960      3/23/2018    Verified  and address with  Wife as identifiers. She will pay out of pocket for a bridge script until 3/26 when she can get the OxyContin script can be filled. He lost several of his pills on Thursday and that is why he needs to pay out of pocket for his pain medicine until 3/26. Wife will come and  script for 3/26    Stefan Gimenez RN Nurse home visit   Palliative Medicine        No future appointments.

## 2018-04-02 NOTE — PROGRESS NOTES
Palliative Medicine  Nursing Note  [phone]  Fax [phone]        Clinic Office Visit  Patient Name: Nilesh Coronado  YOB: 1960    4/2/2018      Advance Care Planning 3/23/2018   Patient's Healthcare Decision Maker is: Named in scanned ACP document   Primary Decision Maker Name Marlene Primrose   Primary Decision Maker Phone Number [phone]   Primary Decision Maker Relationship to Patient Spouse   Confirm Advance Directive Yes, on file   Does the patient have other document types [facility] with Marlene Primrose regarding her concerns about the upcoming removal of her 's, Mr. Gabino Salomonis stent and replacement of his bilateral nephrostomy tubes. She was told by Dr. Santhosh Vargas the urologist that it had to be two separate procedures as well as two completely different hospital events. When he gets the nephrostomy tubes replaced he is admitted inpatient and monitor at least overnight. Dr. Santhosh Vargas said that he would have to come to the hospital at a different time to have the stent placed. She was advice to call Dr. Torrey Mcwilliams nurse, [facility], to have her research the procedures with the IR department at [id number] She did this and [facility] was able to find out that while they were separate procedures they could and will be done during one hospital stay, saving time and emotional distress for the patient and for his wife. She was very relieved to get this news from the nurse.       Gloria Phillips, MARINAN, RN, OCN, V[facility]  Palliative Medicine

## 2018-04-02 NOTE — PROGRESS NOTES
Discharge Summary documented 18    Patient is being discharged from therapy at this time. He has not been seen in therapy since 17 and his plan of care has now . His medical status and his cancer status impacted his ability to attend regular therapy appointments, but he can return to therapy in the future if additional needs arise and with physician referral.  He has a Flexitouch and compression products for home use. Short term goal 3 was met. All other goals were unassessed at discharge.     NATHANIEL Matos, ROYER

## 2018-04-09 NOTE — PROGRESS NOTES
Palliative Medicine  Nursing Note  [phone]  Fax [phone]        Clinic Office Visit  Patient Name: Gita Garcia  YOB: 1960    2018      Advance Care Planning 3/23/2018   Patient's Healthcare Decision Maker is: Named in scanned ACP document   Primary Decision Maker Name Fadia Crystal   Primary Decision Maker Phone Number [phone]   Primary Decision Maker Relationship to Patient Spouse   Confirm Advance Directive Yes, on file   Does the patient have other document types Power SurgeryEdu     Call place to Fadia Crystal, she states that Mr. Keith Carter had a very good weekend. He was able to be outside some, his car battery had  and he jumped it and drove it around the neighborhood. He also asked to go for a ride, so he and his wife went out for a while in the car. She was very pleased with how much he was up and out of the bed over the weekend. He is due for chemotherapy tomorrow, so he is going to have lab work done today. He is due for his bilateral nephrostomy tubes to be changes out and he has one stent remaining in that the urologist feels needs to come out. When Mr. Keith Carter has the tubes replaced he is admitted to the hospital and observed for 1-2 days. Ms. Keith Carter was told by the urologist that the stent removal was an outpatient procedure and would have to be done at a different time than when he is inpatient for the tube changes. This was something that did not make sense to her so she talked with [facility] [place] [facility] in Dr. Heath Canales office about helping her to coordinate having both procedures done during the same admission. She was able to arrange this. They are coordinating between IR and the urologist to schedule a date for this to take place. Ms. Keith Carter has  [facility] records from [facility] and other urology visits that show the stent is blocked and may possibly have scar tissue attached to the stent.   She is concerned that they may not be able to remove the stent and while she knows that it increases the risk of infection to stay in, she also understands that it may not be a priority for it to come out if there is a risk to Mr. Divya Quiñones to have it removed. She was told that this information will be forwarded to Dr. Argentina Cisse.          Elaina Bagley, MARINAN, RN, OCN, [facility]  Palliative Medicine

## 2018-04-23 NOTE — PROGRESS NOTES
Palliative Medicine  Nursing Note  [phone]  Fax [phone]        Clinic Office Visit  Patient Name: Carina Wheeler  YOB: 1960    4/23/2018      Advance Care Planning 3/23/2018   Patient's Healthcare Decision Maker is: Named in scanned ACP document   Primary Decision Maker Name Justa Kam   Primary Decision Maker Phone Number [phone]   Primary Decision Maker Relationship to Patient Spouse   Confirm Advance Directive Yes, on file   Does the patient have other document types Power of          Received phone call from Mr. Lizz Valdez that he is need of a refill of his Oxycontin       Triage for Controlled Substance Refill Request    Pain Diagnosis: _    Last Outpatient Visit: _    Next Outpatient Visit: _ not scheduled    Reason for refill needed outside of office visit? Due for refill,     -Pain escalation requiring increased medication  No  -Appointment not scheduled prior to need for scheduled refill- yes  -Appointment scheduled but missed or moved prior to need for scheduled refill  Due to chemo and different circumstances appointment not scheduled. Pharmacy: _    Medication:  Oxycodone  Dose and directions: 60 mg take 2 tablets every 8 hours   Number dispensed: 180   Date filled ( or Pharmacy): 3/26/2018  # left: 4       reviewed: _ 4/23/2018    Date of Urine Drug Screen:  _ unknown    Opioid Safety Handout given:  _ yes    Appropriate for refill:  _ Yes    Action:  _     Patient/family member is able to pickup prescription at [facility] [place] office on Tuesday. Information will be sent to Dr. Aishwarya Iyer.     MARINA MccoyN, RN, OCN, [facility]  Palliative Medicine

## 2018-04-24 NOTE — PROGRESS NOTES
Palliative Medicine  Nursing Note  [phone]  Fax [phone]        Clinic Office Visit  Patient Name: Thanh Hebert  YOB: 1960    4/24/2018      Advance Care Planning 3/23/2018   Patient's Healthcare Decision Maker is: Named in scanned ACP document   Primary Decision Maker Name Pretty    Primary Decision Maker Phone Number [phone]   Primary Decision Maker Relationship to Patient Spouse   Confirm Advance Directive Yes, on file   Does the patient have other document types Power of [place]       Returned phone call to Mr. Jessica Wakefield, he does not need any oxycodone IR he just filled it last week, and his wife, Giovanni Bowers, is coming to [facility] [place] to  his prescription for oxycodone 60 mg TR12, take 2 tablets every 8 hours.         Karlene Anderson, BSN, RN, OCN, [facility]  Palliative Medicine

## 2018-05-02 NOTE — PROGRESS NOTES
Palliative Medicine Outpatient Services  [place]: [phone] ([phone])    Patient Name: William Multani  YOB: 1960    Date of Current Visit: 5/2/18   Location of Current Visit:    [x] HOME    Date of Initial Visit: 11/8/17  Requesting Physician: Dr. Regine Andre  Primary Care Physician: Pari Maria MD      SUMMARY:   William Multani is a 61y.o. year old with a past history of polyfibrosis syndrome (see below; contractures, keloids, retroperitoneal fibrosis) and esophageal cancer with liver mets diagnosed in 2016 now s/p bilateral nephrostomy tubes and ileostomy as well as DVT now with extensive lower extremity swelling, who was referred to Palliative Medicine by James Lovelace for unmet palliative care needs. The patients social history includes (see below). Palliative Medicine is addressing the following current patient/family concerns: intensity of life changing illness/procedures, pain and caregiver support. PALLIATIVE DIAGNOSES:       ICD-10-CM ICD-9-CM    1. Nausea and vomiting, intractability of vomiting not specified, unspecified vomiting type R11.2 787.01    2. Diarrhea, unspecified type R19.7 787.91    3. Fatigue, unspecified type R53.83 780.79    4. Debility R53.81 799.3    5. Lymphedema I89.0 457.1    6. RPF (retroperitoneal fibrosis) N13.5 593.4    7. Chronic rectal pain K62.89 569.42     G89.29 338.29    8. Cancer associated pain G89.3 338.3 oxyCODONE IR (ROXICODONE) 30 mg immediate release tablet   9. Scrotal pain N50.82 608.9 oxyCODONE IR (ROXICODONE) 30 mg immediate release tablet   10. Back pain, unspecified back location, unspecified back pain laterality, unspecified chronicity M54.9 724.5 oxyCODONE IR (ROXICODONE) 30 mg immediate release tablet      PLAN:     Patient Instructions     Dear William Multani ,    It was a pleasure seeing you at home today. This is what we talked about:     1.  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 Ruben Wells on Monday  -Tamiflu is cleared by the kidneys and can have been at higher levels in your system during this time because your kidney levels were higher  -Tamiflu can have a lot of stomach upset so this may have been the cause  -The elevated kidney function itself can also cause nausea/vomiting  -You are due to go in Monday to have the nephrostomy tubes changed  -Dr. Ruben Wells is arranging this admission with the Urologist  -I am making some recommendations for pain management in case you need them    If you can take pills by mouth  Continue Oxycontin 60mg tablets - 2 tablets (120mg) every 8 hours SCHEDULED   Continue Oxycodone 30mg - 2-3 tablets (30-90mg) every 3 hours as needed for pain    If you need IV medication for pain  Continue Oxycontin 60mg tablets - 2 tablets (120mg) every 8 hours SCHEDULED   Trial Dilaudid 2-3mg IV every 2 hours as need for pain    If you need a PCA  Continue Oxycontin 60mg tablets - 2 tablets (120mg) every 8 hours SCHEDULED   Dilaudid 1mg IV every 6 min by PCA push (with only you pushing the PCA while awake or in pain)    2. Stomach issues  -Campostar is the drug that is contributing to this but Tamiflu could have worsened this  -This is better with the scheduled Zofran but has been worse since the Tamiflu  -You may need to increase the Zofran dose from 4 to 8mg every 8 hours as needed for nausea    3.  Nephrostomy sites  -These are being changed Monday by the Urologist  -We talked some about what if the kidney function worsens during this time  -Its important to think about the implications of this; such as if dialysis is offered, even for a short time  -We talked about prognosis if the kidney function worsens unexpectedly  -We talked about dialysis; sometimes it is and sometimes it is not offered in situations with underlying progressive disease  -Its very important to think about your goals and how you might respond to a situation like this    4. Pain management  -Your pain is not doing well  -This is likely related to more swelling in the pelvic region  -We talked about being really consistent with the pain medication; this helps me determine what you need  -The Oxycodone IR and Oxycontin ER are not interchangeable  -I strongly recommend a pill box for the Oxycontin ER to stick to 120mg every 8 hours exactly  -You are out of Oxycodone IR 30mg; I refilled this today, just a few days early  -You only have 12 days of the Oxycontin ER 60mg left; I will change this to methadone when you return from the hospital for possible better pain control  -You are also taking Tylenol 500mg - we talked about taking no more than 6 tablets or 3000mg in 24 hours of Tylenol  -You haven't used the Ketamine 2-3ml (10-15mg) recently but you may want to restart this given the pain is bad right now  -We talked about opioid safety and I prescribed Naloxone nasal spray in case of an emergency/accidental overdose  -We will make sure this gets to the pharmacy    5.  Advance Care Planning  -At this time you want to continue with the current treatment approach  -We reviewed your values/goals - you would not want to live as a vegetable  -Your Advance Directive is consistent with your values and goals as you expressed today  -We talked about resuscitation  -You elected to remain as a full code status at this time  -We fully support you in this decision     This is what you have shared with us about Alban Thomas. Planning 3/23/2018   Patient's Healthcare Decision Maker is: Named in scanned ACP document   Primary Decision Maker Name Marcel Rico   Primary Decision Maker Phone Number [phone]   Primary Decision Maker Relationship to Patient Spouse   Confirm Advance Directive Yes, on file   Does the patient have other document types Power of      The Palliative Medicine Team is here to support you and your family. We will see you again in a few weeks - we discussed how important it is to call us if you are not feeling well after chemo so we can make adjustments    If there are any concerns before that time, such as medication questions, worsening symptoms or a need to see a physician for an urgent or emergent situation; please call [phone] ([phone]). A physician is also on call after our normal business hours of 8am to 5pm.     In order to serve you better, please allow up to 48 hours for prescription refills to be processed. Certain medications may require more paperwork or a written prescription that you may need to  from the office. We appreciate you letting us know of any refill requests as soon as possible. We also would like you to sign up for [employer] as well.     Sincerely,      Ladene Frankel, MD and the Palliative Medicine Team                 GOALS OF CARE / TREATMENT PREFERENCES:   [====Goals of Care====]  GOALS OF CARE:  Patient / health care proxy stated goals: I've been in pain for so long, I'd like to keep it manageable    TREATMENT PREFERENCES:   Code Status:  [x] Attempt Resuscitation       [] Do Not Attempt Resuscitation    Advance Care Planning:  Advance Care Planning 3/23/2018   Patient's Healthcare Decision Maker is: Named in scanned ACP document   Primary Decision Maker Name Reece Hunter   Primary Decision Maker Phone Number [phone]   Primary Decision Maker Relationship to Patient Spouse   Confirm Advance Directive Yes, on file   Does the patient have other document types Power of      The palliative care team has discussed with patient / health care proxy about goals of care / treatment preferences for patient.  [====Goals of Care====]     PRESCRIPTIONS GIVEN:   Previously gave info on Narcan and Narcan information sheet  Medications Ordered Today   Medications    ELIQUIS 5 mg tablet     Sig: Take 1 Tab by mouth two (2) times a day for 30 days. Dispense:  30 Tab     Refill:  0    oxyCODONE IR (ROXICODONE) 30 mg immediate release tablet     Sig: Take 2 Tabs by mouth every three (3) hours as needed for up to 15 days. Max Daily Amount: 480 mg. Up to 10 pills in 24 hours     Dispense:  150 Tab     Refill:  0    naloxone (NARCAN) 4 mg/actuation nasal spray     Sig: Use 1 spray intranasally into 1 nostril. Use a new Narcan nasal spray for subsequent doses and administer into alternating nostrils. May repeat every 2 to 3 minutes as needed. Indications: OPIATE-INDUCED RESPIRATORY DEPRESSION     Dispense:  1 Each     Refill:  0       FOLLOW UP:   2 weeks    No future appointments. PHYSICIANS INVOLVED IN CARE:   Patient Care Team:  Miri Aburto MD as PCP - General (Family Practice)  Florentin Perez MD as Physician (Hematology)  Tommy Moreno MD as Physician (Palliative Medicine)    Urology: Dr. Mary Gray 12/18 - about every 3 months; leaking - changed out  Renal Dr. Wilder Feliz at [facility] - called on Friday 5/3/18 to update post visit     HISTORY:   Nursing documentation from date of visit reviewed. Reviewed patient-completed ESAS and advance care planning form.   Reviewed patient record in prescription monitoring program.    CHIEF COMPLAINT: Pain and swelling    HPI/SUBJECTIVE:    The patient is: [x] Verbal / [] Nonverbal     Last chemo 2 weeks ago  Tamiflu the last 10 days; last day was Sunday April 29  Has been vomiting  Weight loss evident  Planned for nephrostomy tube change at [facility] by Dr. Patricia Blair  This is for Monday  Creat is 2.5; may be higher   Last checked on Monday  Pain has been bad but then had a few good days when the weather was good  Has been having diarrhea    He has been up more than he has before  When he is up; he has more pain  The pain elevates then permanently  He used more Oxycodone as has more pain; last dose today , bottle empty  Has a lot of swelling and a lot of pain; scrotum is small/not as swollen but is hard; has swelling in pubic area; hard to bend over  Getting dressed is hard  Shoes and socks; worse to bend over and put these on  Hoping that chemo would have improved these things but it hasn't  Has been able to participate more  Was hard today to get up and take the dogs out  He has been having more indigestion and has some vomiting; had nausea and vomiting; acid like vomit   night got sick again; he didn't eat much   Zofran 4mg tid; can go to Zofran to 8mg  Reviewed labs    Oxycodone IR 30mg - 150 filled  - OUT  Oxycodone ER 60mg - 180 filled on  and had 76 left (12 days)  Pill box needed for Oxy ER 60mg  Consider Ketamine  Consider Methadone   Need recommendations for inpatient needs; PCA  Keeps ICE on the pelvic area    Continues on 5-FU and Camptosar/Irinotecan as well as Herceptin  Camptosar can cause diarrhea    From Previous Visits:    Born in [address] [place] [address]  2 brothers and a sister; father ; mother is 80     2 daughters; [employer] kwame; another [address] [facility]  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 2009; 24(2): 747193. 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 Its precise pathophysiology or etiology remains unclear.  In addition to distinctive diverse skin manifestations, patients with polyfibromatosis have been previously reported to show erosive arthropathy with significant limitation of movement at affected joints. However, the presence of erosive polyarthropathy in polyfibromatosis has not emphasized in previous cases. Here, we report a case of polyfibromatosis syndrome combined with painless massive structural destruction of hand and foot joints, and review the characteristics of erosive arthropathy in previous cases. Metastatic esophageal cancer diagnosed 9/1/16  Had Chemotherapy and did well; responded  Dr. Dilshad Peres at [facility] follows him along with Dr. Krystyna Zuniga / Khris Weaver  May 2017 - creatinine level elevation  June 2017 - bilateral stents; right first and then left  August 2017 -stents didn't work so had nephrostomy tubes at [facility]   September 2017 -  started having bowel obstruction; small and large bowel  September 2 to [facility] and then September 12 to [facility] until September 27; then 3 days stay in [facility]  They found an inflammatory process throughout pelvis  When performed ileostomy had ascites; malignant ascites  Restarted chemotherapy; 5-FU; Oxaliplatin (itching/rash);  Herceptin  They are replacing Oxaliplatin with a different medication    Life has changed dramatically  Not happy with all the tubes; not easy to do anything   Pain is constant; rectal pain  Pain and discharge from the rectal area; slowly improving  Feels like has something lodged in rectum  Annoying sensation  Improved significantly; slow improvement but is improved  Was in very severe pain in rectum    He isn't where he wants to be  It is still an effort to do anything  Getting dressed with all the tubes is difficult  Can't really work in the yard  Clothes can be restrictive  Old overalls; doesn't restrict    Diagnosed with 2 DVTs; severe swelling in both legs  Currently on Eloquis  Left knee is hard to bend; without pain and can only bend a particular amount    He has been on medications for chronic pain  But his entire life; he had had keloids that cause pain - burning and sharp severe pain  Found a doctor in [place]; chemo and steroid injections; pulse dye laser   Name Dr. Coby Arce all the neuropathic medications  Contractures in his 35s of his hands  Surgeries on hands; Dr. Erick Navarro  He has been on opioids chronically    Current pain regimen:  Oxycontin  60mg 2 tabs every 8 hours  Oxy IR 30mg  - averaging > 8-10/day regular  Tried Xtampza ER but did not notice any improvement; went back to Oxycontin  Has never been on methadone     FUNCTIONAL ASSESSMENT:     Palliative Performance Scale (PPS):   PPS: 70     PSYCHOSOCIAL/SPIRITUAL SCREENING:     Any spiritual / Episcopal concerns:  [] Yes /  [x] No    Caregiver Burnout:  [] Yes /  [x] No /  [] No Caregiver Present      Anticipatory grief assessment:   [x] Normal  / [] Maladaptive       ESAS Anxiety: Anxiety: 2    ESAS Depression: Depression: 2     REVIEW OF SYSTEMS:     The following systems were [x] reviewed / [] unable to be reviewed  Systems: constitutional, ears/nose/mouth/throat, respiratory, gastrointestinal, genitourinary, musculoskeletal, integumentary, neurologic, psychiatric, endocrine. Positive findings noted below. Modified ESAS Completed by: provider   Fatigue: 8 Drowsiness: 2   Depression: 2 Pain: 8   Anxiety: 2 Nausea: 6   Anorexia: 4 Dyspnea: 0     Constipation: No            PHYSICAL EXAM:     Wt Readings from Last 3 Encounters:   12/13/17 182 lb 9.6 oz (82.8 kg)   11/08/17 182 lb 9.6 oz (82.8 kg)   04/08/11 209 lb 14.1 oz (95.2 kg)     There were no vitals taken for this visit.  - See nursing note, VS not done on this visit  Last bowel movement: See Nursing Note - ostomy/diarrhea    Constitutional: alert, oriented, fatigued, continues to lose weight by observation but wife also reported a 10 lb weight loss at Dr. Coco Cleveland on 4/30/18  Eyes: pupils equal, anicteric  ENMT: no nasal discharge, moist mucous membranes  Cardiovascular:   Respiratory: breathing not labored, symmetric  Gastrointestinal: soft non-tender, ileostomy right lower quadrant, bilateral nephrostomy tubes in flank region; urine clear  : did not reexamine today but previously: brawny edema of scrotum/hard areas due to tense edema, no LAD in groin area, no bladder mass palpable   Musculoskeletal: no deformity, no tenderness to palpation, edema bilateral lower extremities through to thighs (right > left)  Skin: warm, dry  Neurologic: following commands, moving all extremities, contractures of hand bilaterally (prior surgical releases)  Psychiatric: full affect, no hallucinations  Other:     HISTORY:     Past Medical History:   Diagnosis Date    Other ill-defined conditions(815.08)     polyfibromitosis      Past Surgical History:   Procedure Laterality Date    HX ORTHOPAEDIC      hand surgery      No family history on file. History reviewed, no pertinent family history. Social History   Substance Use Topics    Smoking status: Never Smoker    Smokeless tobacco: Never Used    Alcohol use No     Allergies   Allergen Reactions    Levaquin [Levofloxacin] Nausea and Vomiting    Xanax [Alprazolam] Other (comments)     Possible delirium      Current Outpatient Prescriptions   Medication Sig    oxyCODONE IR (ROXICODONE) 30 mg immediate release tablet Take 2 Tabs by mouth every three (3) hours as needed for up to 15 days. Max Daily Amount: 480 mg. Up to 10 pills in 24 hours    naloxone [facility]) 4 mg/actuation nasal spray Use 1 spray intranasally into 1 nostril. Use a new Narcan nasal spray for subsequent doses and administer into alternating nostrils. May repeat every 2 to 3 minutes as needed. Indications: OPIATE-INDUCED RESPIRATORY DEPRESSION    ELIQUIS 5 mg tablet Take 1 Tab by mouth two (2) times a day for 30 days.  oxyCODONE 60 mg TR12 Take 120 mg by mouth every eight (8) hours for 30 days. Max Daily Amount: 360 mg.  Indications: SEVERE PAIN WITH OPIOID TOLERANCE    diphenoxylate-atropine (LOMOTIL) 2.5-0.025 mg per tablet Take 1 Tab by mouth four (4) times daily as needed for Diarrhea.  dicyclomine (BENTYL) 10 mg capsule Take 1 Cap by mouth every four (4) hours as needed.  ketamine (KETALAR) 5 mg/1 mL soln 5 mg/mL oral solution (compounded) Take 3 mL by mouth every six (6) hours.  haloperidol (HALDOL) 2 mg/mL oral concentrate Take 1 mL by mouth every six (6) hours as needed. Indications: In case of Ketamine reaction     No current facility-administered medications for this visit.        LAB DATA REVIEWED:   Reviewed labs from Monday 4/30; creat now 2.5/rising    Labs 12/13/17: See Media; Creat 1.63 and Liver lesions smaller on CT     CONTROLLED SUBSTANCES SAFETY ASSESSMENT (IF ON CONTROLLED SUBSTANCES):   Not currently prescribing patient's opioid medications    Reviewed opioid safety handout:  [x] Yes   [] No  24 hour opioid dose >150mg morphine equivalent/day:  [x] Yes   [] No  Benzodiazepines:  [] Yes   [x] No  Sleep apnea:  [] Yes   [x] No  Urine Toxicology Testing within last 6 months:  [] Yes   [x] No  History of or new aberrant medication taking behaviors:  [] Yes   [x] No  Narcan prescribed: [x] Yes   [] No          Total time: 70min  Counseling / coordination time: 70 min  > 50% counseling / coordination?: pain and disease progression related to renal function

## 2018-05-03 NOTE — PROGRESS NOTES
Labs are ordered for Dr. Riaz Peres and Emily Monroe RN is going to patient's home to draw the this morning. Ms. Riaz Peres was notified.     Shahbaz Carroll RN  Palliative Medicine

## 2018-05-04 NOTE — PROGRESS NOTES
[facility]  Palliative Medicine Office  Nursing Note  [phone]  Fax [phone]    Patient Name: Jass Klein  YOB: 1960      5/3/2018        Advance Care Planning 3/23/2018   Patient's Healthcare Decision Maker is: Named in scanned ACP document   Primary Decision Maker Name Janelle Perea   Primary Decision Maker Phone Number [phone]   Primary Decision Maker Relationship to Patient Spouse   Confirm Advance Directive Yes, on file   Does the patient have other document types Power of [facility] visit made to draw labs. Lab draw done in left arm no issues or difficulty. 2 tubes drawn CBC and CMP. Blood taken to [facility] and blood ordered STAT   Eddi London, RN Nurse home visit   Palliative Medicine        No future appointments.

## 2018-05-06 NOTE — PATIENT INSTRUCTIONS
Kallie Chandra ,    It was a pleasure seeing you at home today. This is what we talked about:     1. 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 Bonita Son on Monday  -Tamiflu is cleared by the kidneys and can have been at higher levels in your system during this time because your kidney levels were higher  -Tamiflu can have a lot of stomach upset so this may have been the cause  -The elevated kidney function itself can also cause nausea/vomiting  -You are due to go in Monday to have the nephrostomy tubes changed  -Dr. Bonita Son is arranging this admission with the Urologist  -I am making some recommendations for pain management in case you need them    If you can take pills by mouth  Continue Oxycontin 60mg tablets - 2 tablets (120mg) every 8 hours SCHEDULED   Continue Oxycodone 30mg - 2-3 tablets (30-90mg) every 3 hours as needed for pain    If you need IV medication for pain  Continue Oxycontin 60mg tablets - 2 tablets (120mg) every 8 hours SCHEDULED   Trial Dilaudid 2-3mg IV every 2 hours as need for pain    If you need a PCA  Continue Oxycontin 60mg tablets - 2 tablets (120mg) every 8 hours SCHEDULED   Dilaudid 1mg IV every 6 min by PCA push (with only you pushing the PCA while awake or in pain)    2. Stomach issues  -Campostar is the drug that is contributing to this but Tamiflu could have worsened this  -This is better with the scheduled Zofran but has been worse since the Tamiflu  -You may need to increase the Zofran dose from 4 to 8mg every 8 hours as needed for nausea    3.  Nephrostomy sites  -These are being changed Monday by the Urologist  -We talked some about what if the kidney function worsens during this time  -Its important to think about the implications of this; such as if dialysis is offered, even for a short time  -We talked about prognosis if the kidney function worsens unexpectedly  -We talked about dialysis; sometimes it is and sometimes it is not offered in situations with underlying progressive disease  -Its very important to think about your goals and how you might respond to a situation like this    4. Pain management  -Your pain is not doing well  -This is likely related to more swelling in the pelvic region  -We talked about being really consistent with the pain medication; this helps me determine what you need  -The Oxycodone IR and Oxycontin ER are not interchangeable  -I strongly recommend a pill box for the Oxycontin ER to stick to 120mg every 8 hours exactly  -You are out of Oxycodone IR 30mg; I refilled this today, just a few days early  -You only have 12 days of the Oxycontin ER 60mg left; I will change this to methadone when you return from the hospital for possible better pain control  -You are also taking Tylenol 500mg - we talked about taking no more than 6 tablets or 3000mg in 24 hours of Tylenol  -You haven't used the Ketamine 2-3ml (10-15mg) recently but you may want to restart this given the pain is bad right now  -We talked about opioid safety and I prescribed Naloxone nasal spray in case of an emergency/accidental overdose  -We will make sure this gets to the pharmacy    5.  Advance Care Planning  -At this time you want to continue with the current treatment approach  -We reviewed your values/goals - you would not want to live as a vegetable  -Your Advance Directive is consistent with your values and goals as you expressed today  -We talked about resuscitation  -You elected to remain as a full code status at this time  -We fully support you in this decision     This is what you have shared with us about [address] 78. Planning 3/23/2018   Patient's Healthcare Decision Maker is: Named in scanned ACP document   Primary Decision Maker Name Fadia Crystal   Primary Decision Maker Phone Number [phone]   Primary Decision Maker Relationship to Patient Spouse   Confirm Advance Directive Yes, on file   Does the patient have other document types Power of      The Palliative Medicine Team is here to support you and your family. We will see you again in a few weeks - we discussed how important it is to call us if you are not feeling well after chemo so we can make adjustments    If there are any concerns before that time, such as medication questions, worsening symptoms or a need to see a physician for an urgent or emergent situation; please call [phone] (COPE). A physician is also on call after our normal business hours of 8am to 5pm.     In order to serve you better, please allow up to 48 hours for prescription refills to be processed. Certain medications may require more paperwork or a written prescription that you may need to  from the office. We appreciate you letting us know of any refill requests as soon as possible. We also would like you to sign up for [facility] as well.     Sincerely,      Zachary Tsang MD and the Palliative Medicine Team

## 2018-05-06 NOTE — ASSESSMENT & PLAN NOTE
This condition is managed by Specialist.  [facility]             oxyCODONE IR (ROXICODONE) 30 mg immediate release tablet  (Taking) Take 2 Tabs by mouth every three (3) hours as needed for up to 15 days. Max Daily Amount: 480 mg. Up to 10 pills in 24 hours    oxyCODONE 60 mg TR12  (Taking) Take 120 mg by mouth every eight (8) hours for 30 days. Max Daily Amount: 360 mg. Indications: SEVERE PAIN WITH OPIOID TOLERANCE        Lab Results   Component Value Date/Time    WBC 5.1 05/03/2018 12:00 AM    ABS.  NEUTROPHILS 3.7 05/03/2018 12:00 AM    HGB 9.8 05/03/2018 12:00 AM    HCT 28.7 05/03/2018 12:00 AM    PLATELET 560 82/57/4015 12:00 AM    Creatinine 2.42 05/03/2018 12:00 AM    Creatinine (POC) 2.0 07/31/2017 06:45 PM    BUN 17 05/03/2018 12:00 AM    ALT (SGPT) 7 01/25/2018 04:00 PM    AST (SGOT) 13 01/25/2018 04:00 PM    Albumin 3.6 01/25/2018 04:00 PM

## 2018-05-08 NOTE — PROGRESS NOTES
Palliative Medicine  Nursing Note  [phone]  Fax [phone]        Clinic Office Visit  Patient Name: Marin Mackey  YOB: 1960    5/8/2018      Advance Care Planning 3/23/2018   Patient's Healthcare Decision Maker is: Named in scanned ACP document   Primary Decision Maker Name Arden Carmona   Primary Decision Maker Phone Number [phone]   Primary Decision Maker Relationship to Patient Spouse   Confirm Advance Directive Yes, on file   Does the patient have other document types [facility] with Norma Juares and Jose Raul Franklin regarding pain issues that Norma Juares has been having during his hospital stay at [facility]. He is having his nephrostomy tubes replaced and a stent removed on 5/9. He has to be on a heparin prior to the procedure for a couple of days. Dr. Argentina Cisse had written out a pain regimen plan for him on his AVS for them to follow while he was hospitalized. PM nurse sent the AVS to Jose Raul Franklin through My Chart and when speaking with her today she was told to be sure to share this with Dr. Jamari Talbert. The PM nurse also faxed the AVS to Dr. Tamara Perkins office and also spoke with his nurse Marisa to let her she would be receiving this fax and to please share it with Dr. Jamari Talbert. She assured this nurse that she would do so.     1500-received [employer] messages that Dr. Jamari Talbert has changed the pain medication orders to match the plan recommended by Dr. Agrentina Cisse.     MARINA ElizabethN, RN, OCN, [facility]  Palliative Medicine

## 2018-05-14 NOTE — TELEPHONE ENCOUNTER
Patient's wife called and also put message in [place]. Patient not feeling well with a greater amount of pain and would like to know if physician would be able to see him at home sometime this week. Please call and let them know.

## 2018-05-14 NOTE — PROGRESS NOTES
Palliative Medicine  Nursing Note  [phone]  Fax [phone]        Clinic Office Visit  Patient Name: Thanh Hebert  YOB: 1960    5/14/2018      Advance Care Planning 3/23/2018   Patient's Healthcare Decision Maker is: Named in scanned ACP document   Primary Decision Maker Name Pretty    Primary Decision Maker Phone Number [phone]   Primary Decision Maker Relationship to Patient Spouse   Confirm Advance Directive Yes, on file   Does the patient have other document types [facility] Pretty  to let her know that Dr. Case Hernandez would like to do a home visit on Wednesday to see Mr. Jessica Wakefield. at 9:15 am. She had also sent a My Chart message expressing that Mr. Jessica Wakefield is not having a very good day today. He is having more pain and pressure today and unnlike him agreed to a home visit with Dr. Case Hernandez readily when Giovanni Bowers asked him if he wanted to see her. Giovanni Bowers has sent the results of the MRI to Dr. Amparo Brennan. 2:16  In the meantime, Dr. Case Hernandez has been able to arrange to go see Mr. Jessica Wakefield this afternoon, Giovanni Bowers is aware.       Karlene Anderson, BSN, RN, OCN, [facility]  Palliative Medicine

## 2018-05-16 NOTE — PROGRESS NOTES
Palliative Medicine  Nursing Note  [phone]  Fax [phone]        Clinic Office Visit  Patient Name: Adam Olea  YOB: 1960    5/16/2018      Advance Care Planning 3/23/2018   Patient's Healthcare Decision Maker is: Named in scanned ACP document   Primary Decision Maker Name Naveed Sloan   Primary Decision Maker Phone Number [phone]   Primary Decision Maker Relationship to Patient Spouse   Confirm Advance Directive Yes, on file   Does the patient have other document types [employer] with Sanam Serrato regarding Mr. Suresh Crews, they called the on-call physician during the night because he had no output from his ileostomy for about 20 hours and they were concerned about an obstruction. (see Dr. Angel Herron note from 5/16/2018 at 6:50 am). Sanam Whitfield later sent a [employer] message to let us know that after following the on-call physicians instructions he began having output from the ileostomy. He had one small BM and then began to have a larger amount of output that was more inline with what in is considered normal for him. He is having some pain and pressure but nothing that is different. She did share that is does not like the way he feels from taking the methadone, took his last dose last night at 11:00 pm and went back to taking the oxycontin 60mg 2 tablets this am.  Dr Rashi Dotson make aware of this.         Bassam Reynaga, BSN, RN, OCN, [facility]  Palliative Medicine

## 2018-05-16 NOTE — PROGRESS NOTES
On call note:    Patient wife Raya Bunn called, patient without any ileostomy out put x 18 hours, crampy abdominal pain, not associated with nausea or vomiting . Patient has h/o intestinal obstruction. Advised to go to ER, patient would like to try stool softener first, and consider  ER visit if symptoms get worse.     I told her [facility] team will follow in the morning after 9 am .

## 2018-05-17 NOTE — PROGRESS NOTES
Palliative Medicine Outpatient Services  [place]: [phone] ([phone])    Patient Name: Georgina Rincon  YOB: 1960    Date of Current Visit: 5/14/18   Location of Current Visit:    [x] HOME    Date of Initial Visit: 11/8/17  Requesting Physician: Dr. Nathalia Roberts  Primary Care Physician: Guilherme Baird MD      SUMMARY:   Georgina Rincon is a 60y.o. year old with a past history of polyfibrosis syndrome (see below; contractures, keloids, retroperitoneal fibrosis) and esophageal cancer with liver mets diagnosed in 2016 now s/p bilateral nephrostomy tubes and ileostomy as well as DVT now with extensive lower extremity swelling, who was referred to Palliative Medicine by Skye Aranda for unmet palliative care needs. The patients social history includes (see below). Palliative Medicine is addressing the following current patient/family concerns: intensity of life changing illness/procedures, pain and caregiver support. PALLIATIVE DIAGNOSES:       ICD-10-CM ICD-9-CM    1. Pelvic pain R10.2 ITC2155    2. Liver metastasis (HCC) C78.7 197.7    3. RPF (retroperitoneal fibrosis) N13.5 593.4 methadone (DOLOPHINE) 10 mg tablet   4. Other chronic pain G89.29 338.29       PLAN:     Patient Instructions     Dear Georgina Rincon ,    It was a pleasure seeing you at home today. This is what we talked about:     Note; I reviewed Dr. Arellanoedia Links notes in our files which review your current cancer status and treatment plan from 5/1/18.      1. Disease / Esophageal cancer with liver lesions + retroperitoneal fibrosis    -You had a recent admission for the nephrostomy tube replacement  -They mentioned something about a mass in the pelvis that was hard to get around  -They took out the stent on the left side  -Your pain was only moderately well managed while there - took awhile to get your regimen scheduled  -Your pain overall has been increasing  -We are going to try methadone  -We talked about the importance of staying with a specific regimen for methadone and never taking more than prescribed  -Your Oxycontin 120mg every 8 hours equals about Methadone 10mg every 8 hours, with slight reduction for safety  -Remember that with any change in therapy, it may take a little time to realize the maximum affect  -We cannot increase the dose of methadone sooner than every 5-7 days for safety reasons  -Start the methadone right away so that you have Oxycontin left over just in case you don't respond to this well    -You are also taking Tylenol 500mg - we talked about taking no more than 6 tablets or 3000mg in 24 hours of Tylenol  -You haven't used the Ketamine 2-3ml (10-15mg) recently but you may want to restart this given the pain is bad right now  -We talked about opioid safety and I prescribed Naloxone nasal spray in case of an emergency/accidental overdose    2. Advance Care Planning  -At this time you want to continue with the current treatment approach  -We reviewed your values/goals - you would not want to live as a vegetable  -Your Advance Directive is consistent with your values and goals as you expressed today  -We talked about resuscitation  -You elected to remain as a full code status at this time  -We fully support you in this decision     This is what you have shared with us about Alban Thomas. Planning 3/23/2018   Patient's Healthcare Decision Maker is: Named in scanned ACP document   Primary Decision Maker Name Hui Connell   Primary Decision Maker Phone Number [phone]   Primary Decision Maker Relationship to Patient Spouse   Confirm Advance Directive Yes, on file   Does the patient have other document types Power of      The Palliative Medicine Team is here to support you and your family.      We will see you again in a few weeks - we discussed how important it is to call us if you are not feeling well after chemo so we can make adjustments    If there are any concerns before that time, such as medication questions, worsening symptoms or a need to see a physician for an urgent or emergent situation; please call [phone] ([phone]). A physician is also on call after our normal business hours of 8am to 5pm.     In order to serve you better, please allow up to 48 hours for prescription refills to be processed. Certain medications may require more paperwork or a written prescription that you may need to  from the office. We appreciate you letting us know of any refill requests as soon as possible. We also would like you to sign up for [employer] as well. Sincerely,      Chris Ga MD and the Palliative Medicine Team            Methadone (By mouth)   Methadone (METH-a-done)  Treats moderate to severe pain and narcotic drug addiction. Brand Name(s): Diskets Dispersible, Dolophine HCl, Methadone HCl Intensol, Methadose   There may be other brand names for this medicine. When This Medicine Should Not Be Used: This medicine is not right for everyone. Do not use it if you had an allergic reaction to methadone, or if you have severe breathing problems or paralytic ileus. How to Use This Medicine:   Liquid, Tablet, Tablet for Suspension  · Take your medicine as directed. Your dose may need to be changed several times to find what works best for you. · An overdose can be dangerous. Follow directions carefully so you do not get too much medicine at one time. · Measure the oral liquid medicine with a marked measuring spoon, oral syringe, or medicine cup. · Oral liquid: Mix the with 2 tablespoons of liquid (unless your doctor tells you differently). Drink the medicine right away. · Tablet for suspension: Mix it with water or another liquid, then drink the mixture right away. Do not swallow the tablet, and do not use it without mixing it in liquid first.  · Tablet: Swallow it whole. Do not crush, break, chew, or dissolve it. · This medicine should come with a Medication Guide.  Ask your pharmacist for a copy if you do not have one. · Missed dose:   ¨ For pain: Take a dose as soon as you remember. If it is almost time for your next dose, wait until then and take a regular dose. Do not take extra medicine to make up for a missed dose. ¨ For narcotic drug addiction: If you miss a dose, take your next dose the following day as scheduled. · Store the medicine in a closed container at room temperature, away from heat, moisture, and direct light. Store the oral liquid at room temperature or in the refrigerator. Do not freeze. Store the medicine in a safe and secure place. Do not throw unused medicine in the trash. Ask your pharmacist about the best way to dispose of medicine you do not use. Drugs and Foods to Avoid:   Ask your doctor or pharmacist before using any other medicine, including over-the-counter medicines, vitamins, and herbal products. · Do not use this medicine if you are using or have used an MAO inhibitor within the past 14 days. · Some medicines can affect how methadone works. Tell your doctor if you are using any of the following:   ¨ Carbamazepine, desipramine, didanosine, erythromycin, fluconazole, fluvoxamine, ketoconazole, mirtazapine, phenobarbital, phenytoin, rifampin, ritonavir, sertraline, stavudine, Sejal's wort, telaprevir, tramadol, trazodone, voriconazole, or zidovudine  ¨ Blood pressure medicine  ¨ Diuretic (water pill)  ¨ Medicine to treat depression  ¨ Medicine to treat heart rhythm problems  ¨ Phenothiazine medicine  ¨ Triptan medicine to treat migraine headaches  · Do not drink alcohol while you are using this medicine. · Tell your doctor if you use anything else that makes you sleepy. Some examples are allergy medicine, narcotic pain medicine, and alcohol. Tell your doctor if you are also using buprenorphine, butorphanol, nalbuphine, pentazocine, a benzodiazepine, or a muscle relaxer.   Warnings While Using This Medicine:   · Tell your doctor if you are pregnant or breastfeeding, or if you have kidney disease, liver disease, heart disease, heart rhythm problems (such as long QT syndrome), breathing or lung problems (such as asthma or COPD), gallbladder problems, pancreas problems, stomach or bowel problems, or trouble urinating. Tell your doctor if you have a history of head injury, brain tumor, seizures, depression, or alcohol or drug addiction. · This medicine may cause the following problems:  ¨ High risk of overdose, which can lead to death  ¨ Respiratory depression (serious breathing problem that can be life-threatening)  ¨ Heart rhythm problems  ¨ Serotonin syndrome (when used with certain medicines)  · This medicine can be habit-forming. Do not use more than your prescribed dose. Call your doctor if you think your medicine is not working. · Do not stop using this medicine suddenly. Your doctor will need to slowly decrease your dose before you stop it completely. · This medicine may make you dizzy, drowsy, or lightheaded. Do not drive or do anything else that could be dangerous until you know how this medicine affects you. Sit or lie down if you feel dizzy. Stand up carefully. · This medicine may cause constipation, especially with long-term use. Ask your doctor if you should use a laxative to prevent and treat constipation. · Tell any doctor or dentist who treats you that you are using this medicine. This medicine may affect certain medical test results. · This medicine could cause infertility. Talk with your doctor before using this medicine if you plan to have children. · Keep all medicine out of the reach of children. Never share your medicine with anyone.   Possible Side Effects While Using This Medicine:   Call your doctor right away if you notice any of these side effects:  · Allergic reaction: Itching or hives, swelling in your face or hands, swelling or tingling in your mouth or throat, chest tightness, trouble breathing  · Anxiety, restlessness, fever, muscle spasms, twitching, diarrhea, seeing or hearing things that are not there  · Blue lips, fingernails, or skin  · Extreme dizziness or weakness, shallow breathing, slow or uneven heartbeat, sweating, seizures, cold or clammy skin  · Fast, pounding, or uneven heartbeat  · Severe confusion, lightheadedness, dizziness, fainting  · Severe constipation, stomach pain, nausea, or vomiting  · Trouble breathing or slow breathing  If you notice these less serious side effects, talk with your doctor:   · Mild constipation, nausea, or vomiting  · Mild sleepiness or tiredness  If you notice other side effects that you think are caused by this medicine, tell your doctor. Call your doctor for medical advice about side effects. You may report side effects to FDA at 2-293-FDA-5709  © 2017 [address] [employer] Information is for End User's use only and may not be sold, redistributed or otherwise used for commercial purposes. The above information is an  only. It is not intended as medical advice for individual conditions or treatments. Talk to your doctor, nurse or pharmacist before following any medical regimen to see if it is safe and effective for you.       Prescribed  Methadone 10mg #21  Methadone 10mg #45 - if tolerates methadone  Oxycodone IR 30mg #180 x 2 scripts each for 15 days         GOALS OF CARE / TREATMENT PREFERENCES:   [====Goals of Care====]  GOALS OF CARE:  Patient / health care proxy stated goals: I've been in pain for so long, I'd like to keep it manageable    TREATMENT PREFERENCES:   Code Status:  [x] Attempt Resuscitation       [] Do Not Attempt Resuscitation    Advance Care Planning:  Advance Care Planning 3/23/2018   Patient's Healthcare Decision Maker is: Named in scanned ACP document   Primary Decision Maker Name Raulito Ackerman   Primary Decision Maker Phone Number [phone]   Primary Decision Maker Relationship to Patient Spouse   Confirm Advance Directive Yes, on file   Does the patient have other document types Power of      The palliative care team has discussed with patient / health care proxy about goals of care / treatment preferences for patient.  [====Goals of Care====]     PRESCRIPTIONS GIVEN:   Previously gave info on Narcan and Narcan information sheet  Medications Ordered Today   Medications    methadone (DOLOPHINE) 10 mg tablet     Sig: Take 1 Tab by mouth three (3) times daily for 7 days. Max Daily Amount: 30 mg. Indications: Chronic Pain     Dispense:  21 Tab     Refill:  0       FOLLOW UP:     Future Appointments  Date Time Provider Ameya Alfonso   2018 3:30 PM [address] [place] [address],  [facility]. IN CARE:   Patient Care Team:  Edward Gaines MD as PCP - General (Family Practice)  Arabella Wilhelm MD as Physician (Hematology)  Mauricio Palmer MD as Physician (Palliative Medicine)    Urology: Changed Nephrostomy tubes May 2018   Renal Dr. Daisy Dale at [facility] - called on Friday 5/3/18 to update post visit     HISTORY:   Nursing documentation from date of visit reviewed. Reviewed patient-completed ESAS and advance care planning form. Reviewed patient record in prescription monitoring program.    CHIEF COMPLAINT: Pain and swelling    HPI/SUBJECTIVE:    The patient is: [x] Verbal / [] Nonverbal     Reviewed hospitalization   Changed Nephrostomy tubes and left stent  Pain worsening in pelvic region    [++++ Clinical Pain Assessment++++]  [++++Pain Severity++++]: Pain: 8  [++++Pain Character++++]: aching  [++++Pain Duration++++]: constant  [++++Pain Effect++++]: cannot bend over to put on socks/shoes etc...   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 2009 Apr; 24(2): [phone]. 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 Its precise pathophysiology or etiology remains unclear. In addition to distinctive diverse skin manifestations, patients with polyfibromatosis have been previously reported to show erosive arthropathy with significant limitation of movement at affected joints. However, the presence of erosive polyarthropathy in polyfibromatosis has not emphasized in previous cases. Here, we report a case of polyfibromatosis syndrome combined with painless massive structural destruction of hand and foot joints, and review the characteristics of erosive arthropathy in previous cases.     Metastatic esophageal cancer diagnosed 9/1/16  Had Chemotherapy and did well; responded  Dr. Ward Dale at [facility] follows him along with Dr. Nyasia Galarza / Paul Cruz  May 2017 - creatinine level elevation  June 2017 - bilateral stents; right first and then left  August 2017 -stents didn't work so had nephrostomy tubes at [facility]   September 2017 -  started having bowel obstruction; small and large bowel  September 2 to [facility] and then September 12 to [facility] until September 27; then 3 days stay in [place]  They found an inflammatory process throughout pelvis  When performed ileostomy had ascites; malignant ascites  Restarted chemotherapy; 5-FU; Oxaliplatin (itching/rash);  Herceptin  They are replacing Oxaliplatin with a different medication    Life has changed dramatically  Not happy with all the tubes; not easy to do anything   Pain is constant; rectal pain  Pain and discharge from the rectal area; slowly improving  Feels like has something lodged in rectum  Annoying sensation  Improved significantly; slow improvement but is improved  Was in very severe pain in rectum    He isn't where he wants to be  It is still an effort to do anything  Getting dressed with all the tubes is difficult  Can't really work in the yard  Clothes can be restrictive  Old overalls; doesn't restrict    Diagnosed with 2 DVTs; severe swelling in both legs  Currently on Eloquis  Left knee is hard to bend; without pain and can only bend a particular amount    He has been on medications for chronic pain  But his entire life; he had had keloids that cause pain - burning and sharp severe pain  Found a doctor in [place]; chemo and steroid injections; pulse dye laser   Name Dr. Tenzin Dsyon all the neuropathic medications  Contractures in his 35s of his hands  Surgeries on hands; Dr. Padma Gutierres  He has been on opioids chronically    Current pain regimen:  Oxycontin  60mg 2 tabs every 8 hours  Oxy IR 30mg  - averaging > 8-10/day regular     FUNCTIONAL ASSESSMENT:     Palliative Performance Scale (PPS):   PPS: 70     PSYCHOSOCIAL/SPIRITUAL SCREENING:     Any spiritual / Sabianism concerns:  [] Yes /  [x] No    Caregiver Burnout:  [] Yes /  [x] No /  [] No Caregiver Present      Anticipatory grief assessment:   [x] Normal  / [] Maladaptive       ESAS Anxiety: Anxiety: 0    ESAS Depression: Depression: 2     REVIEW OF SYSTEMS:     The following systems were [x] reviewed / [] unable to be reviewed  Systems: constitutional, ears/nose/mouth/throat, respiratory, gastrointestinal, genitourinary, musculoskeletal, integumentary, neurologic, psychiatric, endocrine. Positive findings noted below. Modified ESAS Completed by: provider   Fatigue: 6 Drowsiness: 0   Depression: 2 Pain: 8   Anxiety: 0 Nausea: 4   Anorexia: 2 Dyspnea: 0   Best Well-Bein Constipation: No            PHYSICAL EXAM:     Wt Readings from Last 3 Encounters:   17 182 lb 9.6 oz (82.8 kg)   17 182 lb 9.6 oz (82.8 kg)   11 209 lb 14.1 oz (95.2 kg)     Blood pressure 149/81, pulse 62, temperature 98.7 °F (37.1 °C), SpO2 96 %. - See nursing note, VS not done on this visit  Last bowel movement: See Nursing Note - ostomy/diarrhea    Constitutional: alert, oriented, fatigued, continues to lose weight by observation but wife also reported a 10 lb weight loss at Dr. Dannielle Palmer on 18  Eyes: pupils equal, anicteric  ENMT: no nasal discharge, moist mucous membranes  Cardiovascular:   Respiratory: breathing not labored, symmetric  Gastrointestinal: soft non-tender, ileostomy right lower quadrant, bilateral nephrostomy tubes in flank region; urine clear  : patient has fullness / firmness in suprapubic region, right > left w/o distinct adenopathy, + tenderness, no LAD in groin area, no bladder mass palpable   Musculoskeletal: no deformity, no tenderness to palpation, edema bilateral lower extremities through to thighs (right > left)  Skin: warm, dry  Neurologic: following commands, moving all extremities, contractures of hand bilaterally (prior surgical releases)  Psychiatric: full affect, no hallucinations  Other:     HISTORY:     Past Medical History:   Diagnosis Date    Other ill-defined conditions(304.98)     polyfibromitosis      Past Surgical History:   Procedure Laterality Date    HX ORTHOPAEDIC      hand surgery      No family history on file. History reviewed, no pertinent family history.   Social History   Substance Use Topics    Smoking status: Never Smoker    Smokeless tobacco: Never Used    Alcohol use No     Allergies   Allergen Reactions    Levaquin [Levofloxacin] Nausea and Vomiting    Xanax [Alprazolam] Other (comments)     Possible delirium      Current Outpatient Prescriptions   Medication Sig    methadone (DOLOPHINE) 10 mg tablet Take 1 Tab by mouth three (3) times daily for 7 days. Max Daily Amount: 30 mg. Indications: Chronic Pain    oxyCODONE IR (ROXICODONE) 30 mg immediate release tablet Take 2 Tabs by mouth every three (3) hours as needed for up to 15 days. Max Daily Amount: 480 mg. Up to 10 pills in 24 hours    naloxone [facility]) 4 mg/actuation nasal spray Use 1 spray intranasally into 1 nostril. Use a new Narcan nasal spray for subsequent doses and administer into alternating nostrils. May repeat every 2 to 3 minutes as needed. Indications: OPIATE-INDUCED RESPIRATORY DEPRESSION    ELIQUIS 5 mg tablet Take 1 Tab by mouth two (2) times a day for 30 days.  oxyCODONE 60 mg TR12 Take 120 mg by mouth every eight (8) hours for 30 days. Max Daily Amount: 360 mg. Indications: SEVERE PAIN WITH OPIOID TOLERANCE    diphenoxylate-atropine (LOMOTIL) 2.5-0.025 mg per tablet Take 1 Tab by mouth four (4) times daily as needed for Diarrhea.  ketamine (KETALAR) 5 mg/1 mL soln 5 mg/mL oral solution (compounded) Take 3 mL by mouth every six (6) hours.  dicyclomine (BENTYL) 10 mg capsule Take 1 Cap by mouth every four (4) hours as needed.  haloperidol (HALDOL) 2 mg/mL oral concentrate Take 1 mL by mouth every six (6) hours as needed. Indications: In case of Ketamine reaction     No current facility-administered medications for this visit.        LAB DATA REVIEWED:   Reviewed labs from Monday 4/30; creat now 2.5/rising    Labs 12/13/17: See Media; Creat 1.63 and Liver lesions smaller on CT     CONTROLLED SUBSTANCES SAFETY ASSESSMENT (IF ON CONTROLLED SUBSTANCES):   Not currently prescribing patient's opioid medications    Reviewed opioid safety handout:  [x] Yes   [] No  24 hour opioid dose >150mg morphine equivalent/day:  [x] Yes   [] No  Benzodiazepines:  [] Yes   [x] No  Sleep apnea:  [] Yes   [x] No  Urine Toxicology Testing within last 6 months:  [] Yes   [x] No  History of or new aberrant medication taking behaviors:  [] Yes   [x] No  Narcan prescribed: [x] Yes   [] No          Total time: 50min  Counseling / coordination time: 25min  > 50% counseling / coordination?: pain; opioid safety

## 2018-05-17 NOTE — MR AVS SNAPSHOT
[address] [facility] [address] [place] [address] 
[phone] Patient: Jeronimo Mon MRN: [id number]  Visit Information Date & Time Provider Department Dept. Phone Encounter #  
 2018  3:30 PM Prabha Antonio, [address] [facility] [phone] [id number] Follow-up Instructions Return in about 5 days (around 2018). Follow-up and Disposition History Your Appointments 2018  2:30 PM  
HOME VISIT with Prabha Antonio NP Palliative Medicine-[facility] (JACOB SCHEDULING) [address] [facility] [address] [place] [address]  
[phone]  
  
   
 [address] [address] Upcoming Health Maintenance Date Due Hepatitis C Screening 1960 DTaP/Tdap/Td series (1 - Tdap) 5/10/1980 FOBT Q 1 YEAR AGE 50-75 5/10/2009 Pneumococcal 19-64 Highest Risk (1 of 3 - PCV13) 2018* Influenza Age 5 to Adult 2018* *Topic was postponed. The date shown is not the original due date. Allergies as of 2018  Review Complete On: 2018 By: Usha Tapia MD  
  
 Severity Noted Reaction Type Reactions Levaquin [Levofloxacin]  2011    Nausea and Vomiting Xanax [Alprazolam] Low 2018   Side Effect Other (comments) Possible delirium Current Immunizations  Never Reviewed No immunizations on file. Not reviewed this visit You Were Diagnosed With   
  
 Codes Comments Pelvic pain    -  Primary ICD-10-CM: R10.2 ICD-9-CM: [id number] Nausea and vomiting, intractability of vomiting not specified, unspecified vomiting type     ICD-10-CM: R11.2 ICD-9-CM: 787.01 Fatigue, unspecified type     ICD-10-CM: R53.83 ICD-9-CM: 780.79 Cancer associated pain     ICD-10-CM: G89.3 ICD-9-CM: 338. 3 Vitals Smoking Status Never Smoker Preferred Pharmacy Pharmacy Name Phone [id number]- [place], [facility] [phone] Your Updated Medication List  
  
   
This list is accurate as of 5/17/18 11:59 PM.  Always use your most recent med list.  
  
  
  
  
 dicyclomine 10 mg capsule Commonly known as:  BENTYL Take 1 Cap by mouth every four (4) hours as needed. ELIQUIS 5 mg tablet Generic drug:  apixaban Take 1 Tab by mouth two (2) times a day for 30 days.  
  
 haloperidol 2 mg/mL oral concentrate Commonly known as:  HALDOL Take 1 mL by mouth every six (6) hours as needed. Indications: In case of Ketamine reaction  
  
 ketamine 5 mg/1 mL Soln 5 mg/mL oral solution (compounded) Commonly known as:  KETALAR Take 3 mL by mouth every six (6) hours. LOMOTIL 2.5-0.025 mg per tablet Generic drug:  diphenoxylate-atropine Take 1 Tab by mouth four (4) times daily as needed for Diarrhea. methadone 10 mg tablet Commonly known as:  DOLOPHINE Take 1 Tab by mouth three (3) times daily for 7 days. Max Daily Amount: 30 mg. Indications: Chronic Pain  
  
 naloxone 4 mg/actuation nasal spray Commonly known as:  [employer] Use 1 spray intranasally into 1 nostril. Use a new Narcan nasal spray for subsequent doses and administer into alternating nostrils. May repeat every 2 to 3 minutes as needed. Indications: OPIATE-INDUCED RESPIRATORY DEPRESSION  
  
 * oxyCODONE 60 mg Tr12 Take 120 mg by mouth every eight (8) hours for 30 days. Max Daily Amount: 360 mg. Indications: SEVERE PAIN WITH OPIOID TOLERANCE  
  
 * oxyCODONE IR 30 mg immediate release tablet Commonly known as:  Tobin Arango Take 2 Tabs by mouth every three (3) hours as needed for up to 15 days. Max Daily Amount: 480 mg. Up to 10 pills in 24 hours * Notice: This list has 2 medication(s) that are the same as other medications prescribed for you.  Read the directions carefully, and ask your doctor or other care provider to review them with you. Follow-up Instructions Return in about 5 days (around 5/22/2018). Patient Instructions Dear Zabrina Chandra , It was a pleasure seeing you at home today. This is what we talked about:  
 
Note; I reviewed Dr. Victor M Anne notes in our files which review your current cancer status and treatment plan from 5/1/18. 1. 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 
-I have left a script for Oxycontin if you are unable to tolerate the methadone at 5mg, but please let us know if you want to stop it 
-Remember that with any change in therapy, it may take a little time to realize the maximum affect 
-We cannot increase the dose of methadone sooner than every 5-7 days for safety reasons 
-Start the methadone right away so that you have Oxycontin left over just in case you don't respond to this well 
 
-You are also taking Tylenol 500mg - we talked about taking no more than 6 tablets or 3000mg in 24 hours of Tylenol 
-You haven't used the Ketamine 2-3ml (10-15mg) recently but you may want to restart this given the pain is bad right now 
-We talked about opioid safety and I prescribed Naloxone nasal spray in case of an emergency/accidental overdose 2. Advance Care Planning 
-At this time you want to continue with the current treatment approach 
-We reviewed your values/goals - you would not want to live as a vegetable 
-Your Advance Directive is consistent with your values and goals as you expressed today 
-We talked about resuscitation 
-You elected to remain as a full code status at this time 
-We fully support you in this decision This is what you have shared with us about Advance Care Planning Advance Care Planning 3/23/2018 Patient's Healthcare Decision Maker is: Named in scanned ACP document Primary Decision Maker Name Andra Ferguson Primary Decision Maker Phone Number [phone] Primary Decision Maker Relationship to Patient Spouse Confirm Advance Directive Yes, on file Does the patient have other document types Power Haiku Deckck The Palliative Medicine Team is here to support you and your family. We will see you again in 5 days If there are any concerns before that time, such as medication questions, worsening symptoms or a need to see a physician for an urgent or emergent situation; please call [phone] (COPE). A physician is also on call after our normal business hours of 8am to 5pm.  
 
In order to serve you better, please allow up to 48 hours for prescription refills to be processed. Certain medications may require more paperwork or a written prescription that you may need to  from the office. We appreciate you letting us know of any refill requests as soon as possible. We also would like you to sign up for [email or web address] as well. Sincerely, Roberto Kenny NP and the Palliative Medicine Team 
 
 
 
 
 
Methadone (By mouth) Methadone (METH-a-done) Treats moderate to severe pain and narcotic drug addiction. Brand Name(s): Diskets Dispersible, Dolophine HCl, Methadone HCl Intensol, Methadose There may be other brand names for this medicine. When This Medicine Should Not Be Used: This medicine is not right for everyone. Do not use it if you had an allergic reaction to methadone, or if you have severe breathing problems or paralytic ileus. How to Use This Medicine:  
Liquid, Tablet, Tablet for Suspension · Take your medicine as directed. Your dose may need to be changed several times to find what works best for you. · An overdose can be dangerous. Follow directions carefully so you do not get too much medicine at one time. · Measure the oral liquid medicine with a marked measuring spoon, oral syringe, or medicine cup.  
· Oral liquid: Mix the with 2 tablespoons of liquid (unless your doctor tells you differently). Drink the medicine right away. · Tablet for suspension: Mix it with water or another liquid, then drink the mixture right away. Do not swallow the tablet, and do not use it without mixing it in liquid first. 
· Tablet: Swallow it whole. Do not crush, break, chew, or dissolve it. · This medicine should come with a Medication Guide. Ask your pharmacist for a copy if you do not have one. · Missed dose: ¨ For pain: Take a dose as soon as you remember. If it is almost time for your next dose, wait until then and take a regular dose. Do not take extra medicine to make up for a missed dose. ¨ For narcotic drug addiction: If you miss a dose, take your next dose the following day as scheduled. · Store the medicine in a closed container at room temperature, away from heat, moisture, and direct light. Store the oral liquid at room temperature or in the refrigerator. Do not freeze. Store the medicine in a safe and secure place. Do not throw unused medicine in the trash. Ask your pharmacist about the best way to dispose of medicine you do not use. Drugs and Foods to Avoid: Ask your doctor or pharmacist before using any other medicine, including over-the-counter medicines, vitamins, and herbal products. · Do not use this medicine if you are using or have used an MAO inhibitor within the past 14 days. · Some medicines can affect how methadone works. Tell your doctor if you are using any of the following: ¨ Carbamazepine, desipramine, didanosine, erythromycin, fluconazole, fluvoxamine, ketoconazole, mirtazapine, phenobarbital, phenytoin, rifampin, ritonavir, sertraline, stavudine, Sejal's wort, telaprevir, tramadol, trazodone, voriconazole, or zidovudine ¨ Blood pressure medicine ¨ Diuretic (water pill) ¨ Medicine to treat depression ¨ Medicine to treat heart rhythm problems ¨ Phenothiazine medicine ¨ Triptan medicine to treat migraine headaches · Do not drink alcohol while you are using this medicine. · Tell your doctor if you use anything else that makes you sleepy. Some examples are allergy medicine, narcotic pain medicine, and alcohol. Tell your doctor if you are also using buprenorphine, butorphanol, nalbuphine, pentazocine, a benzodiazepine, or a muscle relaxer. Warnings While Using This Medicine: · Tell your doctor if you are pregnant or breastfeeding, or if you have kidney disease, liver disease, heart disease, heart rhythm problems (such as long QT syndrome), breathing or lung problems (such as asthma or COPD), gallbladder problems, pancreas problems, stomach or bowel problems, or trouble urinating. Tell your doctor if you have a history of head injury, brain tumor, seizures, depression, or alcohol or drug addiction. · This medicine may cause the following problems: 
¨ High risk of overdose, which can lead to death ¨ Respiratory depression (serious breathing problem that can be life-threatening) ¨ Heart rhythm problems ¨ Serotonin syndrome (when used with certain medicines) · This medicine can be habit-forming. Do not use more than your prescribed dose. Call your doctor if you think your medicine is not working. · Do not stop using this medicine suddenly. Your doctor will need to slowly decrease your dose before you stop it completely. · This medicine may make you dizzy, drowsy, or lightheaded. Do not drive or do anything else that could be dangerous until you know how this medicine affects you. Sit or lie down if you feel dizzy. Stand up carefully. · This medicine may cause constipation, especially with long-term use. Ask your doctor if you should use a laxative to prevent and treat constipation. · Tell any doctor or dentist who treats you that you are using this medicine. This medicine may affect certain medical test results. · This medicine could cause infertility.  Talk with your doctor before using this medicine if you plan to have children. · Keep all medicine out of the reach of children. Never share your medicine with anyone. Possible Side Effects While Using This Medicine:  
Call your doctor right away if you notice any of these side effects: · Allergic reaction: Itching or hives, swelling in your face or hands, swelling or tingling in your mouth or throat, chest tightness, trouble breathing · Anxiety, restlessness, fever, muscle spasms, twitching, diarrhea, seeing or hearing things that are not there · Blue lips, fingernails, or skin · Extreme dizziness or weakness, shallow breathing, slow or uneven heartbeat, sweating, seizures, cold or clammy skin · Fast, pounding, or uneven heartbeat · Severe confusion, lightheadedness, dizziness, fainting · Severe constipation, stomach pain, nausea, or vomiting · Trouble breathing or slow breathing If you notice these less serious side effects, talk with your doctor: · Mild constipation, nausea, or vomiting · Mild sleepiness or tiredness If you notice other side effects that you think are caused by this medicine, tell your doctor. Call your doctor for medical advice about side effects. You may report side effects to FDA at 2-443-FDA-5053 © 2017 [facility] [place] Information is for End User's use only and may not be sold, redistributed or otherwise used for commercial purposes. The above information is an  only. It is not intended as medical advice for individual conditions or treatments. Talk to your doctor, nurse or pharmacist before following any medical regimen to see if it is safe and effective for you. Introducing [facility]! Dear Brandy Bazan: Thank you for requesting a [email or web address] account. Our records indicate that you already have an active [email or web address] account. You can access your account anytime at [email or web address] Did you know that you can access your hospital and ER discharge instructions at any time in Futubra? You can also review all of your test results from your hospital stay or ER visit. Additional Information If you have questions, please visit the Frequently Asked Questions section of the Futubra website at [email or web address] Remember, Futubra is NOT to be used for urgent needs. For medical emergencies, dial 911. Now available from your iPhone and Android! Please provide this summary of care documentation to your next provider. Your primary care clinician is listed as Donovan Rdz. If you have any questions after today's visit, please call [phone].

## 2018-05-17 NOTE — PATIENT INSTRUCTIONS
Dear Ivet Buchanan ,    It was a pleasure seeing you at home today. This is what we talked about:     Note; I reviewed Dr. Stacie Ruiz notes in our files which review your current cancer status and treatment plan from 5/1/18. 1. 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    -You had a recent admission for the nephrostomy tube replacement  -They mentioned something about a mass in the pelvis that was hard to get around  -They took out the stent on the left side  -Your pain was only moderately well managed while there - took awhile to get your regimen scheduled  -Your pain overall has been increasing  -We are going to try methadone  -We talked about the importance of staying with a specific regimen for methadone and never taking more than prescribed  -Your Oxycontin 120mg every 8 hours equals about Methadone 10mg every 8 hours, with slight reduction for safety  -Remember that with any change in therapy, it may take a little time to realize the maximum affect  -We cannot increase the dose of methadone sooner than every 5-7 days for safety reasons  -Start the methadone right away so that you have Oxycontin left over just in case you don't respond to this well    -You are also taking Tylenol 500mg - we talked about taking no more than 6 tablets or 3000mg in 24 hours of Tylenol  -You haven't used the Ketamine 2-3ml (10-15mg) recently but you may want to restart this given the pain is bad right now  -We talked about opioid safety and I prescribed Naloxone nasal spray in case of an emergency/accidental overdose    2.  Advance Care Planning  -At this time you want to continue with the current treatment approach  -We reviewed your values/goals - you would not want to live as a vegetable  -Your Advance Directive is consistent with your values and goals as you expressed today  -We talked about resuscitation  -You elected to remain as a full code status at this time  -We fully support you in this decision     This is what you have shared with us about [address] Ch[address]ol[address] 78. Planning 3/23/2018   Patient's Healthcare Decision Maker is: Named in scanned ACP document   Primary Decision Maker Name Abraham Castro   Primary Decision Maker Phone Number [phone]   Primary Decision Maker Relationship to Patient Spouse   Confirm Advance Directive Yes, on file   Does the patient have other document types Power of      The Palliative Medicine Team is here to support you and your family. We will see you again in a few weeks - we discussed how important it is to call us if you are not feeling well after chemo so we can make adjustments    If there are any concerns before that time, such as medication questions, worsening symptoms or a need to see a physician for an urgent or emergent situation; please call [phone] (COPE). A physician is also on call after our normal business hours of 8am to 5pm.     In order to serve you better, please allow up to 48 hours for prescription refills to be processed. Certain medications may require more paperwork or a written prescription that you may need to  from the office. We appreciate you letting us know of any refill requests as soon as possible. We also would like you to sign up for [employer] as well. Sincerely,      Jens Rausch MD and the Palliative Medicine Team            Methadone (By mouth)   Methadone (METH-a-done)  Treats moderate to severe pain and narcotic drug addiction. Brand Name(s): Diskets Dispersible, Dolophine HCl, Methadone HCl Intensol, Methadose   There may be other brand names for this medicine. When This Medicine Should Not Be Used: This medicine is not right for everyone. Do not use it if you had an allergic reaction to methadone, or if you have severe breathing problems or paralytic ileus. How to Use This Medicine:   Liquid, Tablet, Tablet for Suspension  · Take your medicine as directed.  Your dose may need to be changed several times to find what works best for you. · An overdose can be dangerous. Follow directions carefully so you do not get too much medicine at one time. · Measure the oral liquid medicine with a marked measuring spoon, oral syringe, or medicine cup. · Oral liquid: Mix the with 2 tablespoons of liquid (unless your doctor tells you differently). Drink the medicine right away. · Tablet for suspension: Mix it with water or another liquid, then drink the mixture right away. Do not swallow the tablet, and do not use it without mixing it in liquid first.  · Tablet: Swallow it whole. Do not crush, break, chew, or dissolve it. · This medicine should come with a Medication Guide. Ask your pharmacist for a copy if you do not have one. · Missed dose:   ¨ For pain: Take a dose as soon as you remember. If it is almost time for your next dose, wait until then and take a regular dose. Do not take extra medicine to make up for a missed dose. ¨ For narcotic drug addiction: If you miss a dose, take your next dose the following day as scheduled. · Store the medicine in a closed container at room temperature, away from heat, moisture, and direct light. Store the oral liquid at room temperature or in the refrigerator. Do not freeze. Store the medicine in a safe and secure place. Do not throw unused medicine in the trash. Ask your pharmacist about the best way to dispose of medicine you do not use. Drugs and Foods to Avoid:   Ask your doctor or pharmacist before using any other medicine, including over-the-counter medicines, vitamins, and herbal products. · Do not use this medicine if you are using or have used an MAO inhibitor within the past 14 days. · Some medicines can affect how methadone works.  Tell your doctor if you are using any of the following:   ¨ Carbamazepine, desipramine, didanosine, erythromycin, fluconazole, fluvoxamine, ketoconazole, mirtazapine, phenobarbital, phenytoin, rifampin, ritonavir, sertraline, stavudine, Sejal's wort, telaprevir, tramadol, trazodone, voriconazole, or zidovudine  ¨ Blood pressure medicine  ¨ Diuretic (water pill)  ¨ Medicine to treat depression  ¨ Medicine to treat heart rhythm problems  ¨ Phenothiazine medicine  ¨ Triptan medicine to treat migraine headaches  · Do not drink alcohol while you are using this medicine. · Tell your doctor if you use anything else that makes you sleepy. Some examples are allergy medicine, narcotic pain medicine, and alcohol. Tell your doctor if you are also using buprenorphine, butorphanol, nalbuphine, pentazocine, a benzodiazepine, or a muscle relaxer. Warnings While Using This Medicine:   · Tell your doctor if you are pregnant or breastfeeding, or if you have kidney disease, liver disease, heart disease, heart rhythm problems (such as long QT syndrome), breathing or lung problems (such as asthma or COPD), gallbladder problems, pancreas problems, stomach or bowel problems, or trouble urinating. Tell your doctor if you have a history of head injury, brain tumor, seizures, depression, or alcohol or drug addiction. · This medicine may cause the following problems:  ¨ High risk of overdose, which can lead to death  ¨ Respiratory depression (serious breathing problem that can be life-threatening)  ¨ Heart rhythm problems  ¨ Serotonin syndrome (when used with certain medicines)  · This medicine can be habit-forming. Do not use more than your prescribed dose. Call your doctor if you think your medicine is not working. · Do not stop using this medicine suddenly. Your doctor will need to slowly decrease your dose before you stop it completely. · This medicine may make you dizzy, drowsy, or lightheaded. Do not drive or do anything else that could be dangerous until you know how this medicine affects you. Sit or lie down if you feel dizzy. Stand up carefully. · This medicine may cause constipation, especially with long-term use.  Ask your doctor if you should use a laxative to prevent and treat constipation. · Tell any doctor or dentist who treats you that you are using this medicine. This medicine may affect certain medical test results. · This medicine could cause infertility. Talk with your doctor before using this medicine if you plan to have children. · Keep all medicine out of the reach of children. Never share your medicine with anyone. Possible Side Effects While Using This Medicine:   Call your doctor right away if you notice any of these side effects:  · Allergic reaction: Itching or hives, swelling in your face or hands, swelling or tingling in your mouth or throat, chest tightness, trouble breathing  · Anxiety, restlessness, fever, muscle spasms, twitching, diarrhea, seeing or hearing things that are not there  · Blue lips, fingernails, or skin  · Extreme dizziness or weakness, shallow breathing, slow or uneven heartbeat, sweating, seizures, cold or clammy skin  · Fast, pounding, or uneven heartbeat  · Severe confusion, lightheadedness, dizziness, fainting  · Severe constipation, stomach pain, nausea, or vomiting  · Trouble breathing or slow breathing  If you notice these less serious side effects, talk with your doctor:   · Mild constipation, nausea, or vomiting  · Mild sleepiness or tiredness  If you notice other side effects that you think are caused by this medicine, tell your doctor. Call your doctor for medical advice about side effects. You may report side effects to FDA at 1-488-FDA-7333  © 2017 [facility] Information is for End User's use only and may not be sold, redistributed or otherwise used for commercial purposes. The above information is an  only. It is not intended as medical advice for individual conditions or treatments. Talk to your doctor, nurse or pharmacist before following any medical regimen to see if it is safe and effective for you.

## 2018-05-18 NOTE — PROGRESS NOTES
Labs results were emailed to Dr Jese Blakely. Marianna Yanez was called with the results as well. She stated in her MyChart message that he feels worse on the methadone and he went back on the oxycontin, Dr. Jese Blakely aware, and is okay with the change back.     Godfrey Chase RN  Palliative Medicine

## 2018-05-18 NOTE — TELEPHONE ENCOUNTER
Patients wife left 2 messages on voicemail today 9:30am and 10:10am requesting nurse to come to home to do labs

## 2018-05-18 NOTE — TELEPHONE ENCOUNTER
Beena Kimbrough and spoke to her about Mr. Polk. She states he can not make it to see Dr. Ani Huff today and when Emma Bridges NP was at their home yesterday she stated that if he did not go to Benjamin's office they wouild come back to draw labs on him. An email was sent to Emma Bridges to have her call the PM nurse to see if this is what she plans to do.     Hodan Short RN  Palliative Medicine

## 2018-05-21 NOTE — PROGRESS NOTES
[employer] Palliative Medicine Office  Nursing Note  [phone]  Fax [phone]     Name:  Lonie Heimlich  YOB: 1960     Per Dr. Navya Youngblood orders:    Nurse called [facility], [phone], spoke with Selvin Anne, ordered STAT plain abdominal x-ray. Nurse called [address]- to request a visit for evaluation of worsening abdominal pain and vomiting and administration of IV Normal Saline if necessary, spoke with Odalys Devine. "[employer] will make a visit to pt this afternoon. Provider will call Dr. Huy Savage prior to visit to discuss pt's history. Nurse called [facility] [phone] to inquire about the possibility of a Fentanyl PCA. Spoke with Sarahy who said due to the national shortage of opioids, they do not have Fentanyl available for PCA.      Kaitlin Rushing, BSN, RN  Clinical Referral Navigator

## 2018-05-21 NOTE — PROGRESS NOTES
Palliative Medicine Outpatient Services  [place]: [phone]    Patient Name: Moose Garner  YOB: 1960    Date of Current Visit: 5/17/18   Location of Current Visit:    [x] HOME    Date of Initial Visit: 11/8/17  Requesting Physician: Dr. Chidi Boyle  Primary Care Physician: Allison Engel MD      SUMMARY:   Moose Garner is a 60y.o. year old with a past history of polyfibrosis syndrome (see below; contractures, keloids, retroperitoneal fibrosis) and esophageal cancer with liver mets diagnosed in 2016 now s/p bilateral nephrostomy tubes and ileostomy as well as DVT now with extensive lower extremity swelling, who was referred to Palliative Medicine by Josh Garvin for unmet palliative care needs. The patients social history includes (see below). Palliative Medicine is addressing the following current patient/family concerns: intensity of life changing illness/procedures, pain and caregiver support. PALLIATIVE DIAGNOSES:       ICD-10-CM ICD-9-CM    1. Pelvic pain R10.2 [id number]    2. Nausea and vomiting, intractability of vomiting not specified, unspecified vomiting type R11.2 787.01    3. Fatigue, unspecified type R53.83 780.79    4. Cancer associated pain G89.3 338.3       PLAN:     Patient Instructions     Dear Moose Garner ,    It was a pleasure seeing you at home today. This is what we talked about:     Note; I reviewed Dr. Adelita Linares notes in our files which review your current cancer status and treatment plan from 5/1/18.      1. Disease / Esophageal cancer with liver lesions + retroperitoneal fibrosis    -Start Methadone 5mg every 8 hours  -I have left a script for Oxycontin if you are unable to tolerate the methadone at 5mg, but please let us know if you want to stop it  -Remember that with any change in therapy, it may take a little time to realize the maximum affect  -We cannot increase the dose of methadone sooner than every 5-7 days for safety reasons  -Start the methadone right away so that you have Oxycontin left over just in case you don't respond to this well    -You are also taking Tylenol 500mg - we talked about taking no more than 6 tablets or 3000mg in 24 hours of Tylenol  -You haven't used the Ketamine 2-3ml (10-15mg) recently but you may want to restart this given the pain is bad right now  -We talked about opioid safety and I prescribed Naloxone nasal spray in case of an emergency/accidental overdose    2. Advance Care Planning  -At this time you want to continue with the current treatment approach  -We reviewed your values/goals - you would not want to live as a vegetable  -Your Advance Directive is consistent with your values and goals as you expressed today  -We talked about resuscitation  -You elected to remain as a full code status at this time  -We fully support you in this decision     This is what you have shared with us about Alban Phan Thomas. Planning 3/23/2018   Patient's Healthcare Decision Maker is: Named in scanned ACP document   Primary Decision Maker Name Mariia Bahena   Primary Decision Maker Phone Number [phone]   Primary Decision Maker Relationship to Patient Spouse   Confirm Advance Directive Yes, on file   Does the patient have other document types Power of      The Palliative Medicine Team is here to support you and your family. We will see you again in 5 days    If there are any concerns before that time, such as medication questions, worsening symptoms or a need to see a physician for an urgent or emergent situation; please call [phone] (COPE). A physician is also on call after our normal business hours of 8am to 5pm.     In order to serve you better, please allow up to 48 hours for prescription refills to be processed. Certain medications may require more paperwork or a written prescription that you may need to  from the office.  We appreciate you letting us know of any refill requests as soon as possible. We also would like you to sign up for [facility] as well. Sincerely,      Claudia Worley NP and the Palliative Medicine Team            Methadone (By mouth)   Methadone (METH-a-done)  Treats moderate to severe pain and narcotic drug addiction. Brand Name(s): Diskets Dispersible, Dolophine HCl, Methadone HCl Intensol, Methadose   There may be other brand names for this medicine. When This Medicine Should Not Be Used: This medicine is not right for everyone. Do not use it if you had an allergic reaction to methadone, or if you have severe breathing problems or paralytic ileus. How to Use This Medicine:   Liquid, Tablet, Tablet for Suspension  · Take your medicine as directed. Your dose may need to be changed several times to find what works best for you. · An overdose can be dangerous. Follow directions carefully so you do not get too much medicine at one time. · Measure the oral liquid medicine with a marked measuring spoon, oral syringe, or medicine cup. · Oral liquid: Mix the with 2 tablespoons of liquid (unless your doctor tells you differently). Drink the medicine right away. · Tablet for suspension: Mix it with water or another liquid, then drink the mixture right away. Do not swallow the tablet, and do not use it without mixing it in liquid first.  · Tablet: Swallow it whole. Do not crush, break, chew, or dissolve it. · This medicine should come with a Medication Guide. Ask your pharmacist for a copy if you do not have one. · Missed dose:   ¨ For pain: Take a dose as soon as you remember. If it is almost time for your next dose, wait until then and take a regular dose. Do not take extra medicine to make up for a missed dose. ¨ For narcotic drug addiction: If you miss a dose, take your next dose the following day as scheduled. · Store the medicine in a closed container at room temperature, away from heat, moisture, and direct light.  Store the oral liquid at room temperature or in the refrigerator. Do not freeze. Store the medicine in a safe and secure place. Do not throw unused medicine in the trash. Ask your pharmacist about the best way to dispose of medicine you do not use. Drugs and Foods to Avoid:   Ask your doctor or pharmacist before using any other medicine, including over-the-counter medicines, vitamins, and herbal products. · Do not use this medicine if you are using or have used an MAO inhibitor within the past 14 days. · Some medicines can affect how methadone works. Tell your doctor if you are using any of the following:   ¨ Carbamazepine, desipramine, didanosine, erythromycin, fluconazole, fluvoxamine, ketoconazole, mirtazapine, phenobarbital, phenytoin, rifampin, ritonavir, sertraline, stavudine, Sejal's wort, telaprevir, tramadol, trazodone, voriconazole, or zidovudine  ¨ Blood pressure medicine  ¨ Diuretic (water pill)  ¨ Medicine to treat depression  ¨ Medicine to treat heart rhythm problems  ¨ Phenothiazine medicine  ¨ Triptan medicine to treat migraine headaches  · Do not drink alcohol while you are using this medicine. · Tell your doctor if you use anything else that makes you sleepy. Some examples are allergy medicine, narcotic pain medicine, and alcohol. Tell your doctor if you are also using buprenorphine, butorphanol, nalbuphine, pentazocine, a benzodiazepine, or a muscle relaxer. Warnings While Using This Medicine:   · Tell your doctor if you are pregnant or breastfeeding, or if you have kidney disease, liver disease, heart disease, heart rhythm problems (such as long QT syndrome), breathing or lung problems (such as asthma or COPD), gallbladder problems, pancreas problems, stomach or bowel problems, or trouble urinating. Tell your doctor if you have a history of head injury, brain tumor, seizures, depression, or alcohol or drug addiction.   · This medicine may cause the following problems:  ¨ High risk of overdose, which can lead to death  ¨ Respiratory depression (serious breathing problem that can be life-threatening)  ¨ Heart rhythm problems  ¨ Serotonin syndrome (when used with certain medicines)  · This medicine can be habit-forming. Do not use more than your prescribed dose. Call your doctor if you think your medicine is not working. · Do not stop using this medicine suddenly. Your doctor will need to slowly decrease your dose before you stop it completely. · This medicine may make you dizzy, drowsy, or lightheaded. Do not drive or do anything else that could be dangerous until you know how this medicine affects you. Sit or lie down if you feel dizzy. Stand up carefully. · This medicine may cause constipation, especially with long-term use. Ask your doctor if you should use a laxative to prevent and treat constipation. · Tell any doctor or dentist who treats you that you are using this medicine. This medicine may affect certain medical test results. · This medicine could cause infertility. Talk with your doctor before using this medicine if you plan to have children. · Keep all medicine out of the reach of children. Never share your medicine with anyone.   Possible Side Effects While Using This Medicine:   Call your doctor right away if you notice any of these side effects:  · Allergic reaction: Itching or hives, swelling in your face or hands, swelling or tingling in your mouth or throat, chest tightness, trouble breathing  · Anxiety, restlessness, fever, muscle spasms, twitching, diarrhea, seeing or hearing things that are not there  · Blue lips, fingernails, or skin  · Extreme dizziness or weakness, shallow breathing, slow or uneven heartbeat, sweating, seizures, cold or clammy skin  · Fast, pounding, or uneven heartbeat  · Severe confusion, lightheadedness, dizziness, fainting  · Severe constipation, stomach pain, nausea, or vomiting  · Trouble breathing or slow breathing  If you notice these less serious side effects, talk with your doctor:   · Mild constipation, nausea, or vomiting  · Mild sleepiness or tiredness  If you notice other side effects that you think are caused by this medicine, tell your doctor. Call your doctor for medical advice about side effects. You may report side effects to FDA at 3-560-BLE-1201  © 2017 [address] [employer] Information is for End User's use only and may not be sold, redistributed or otherwise used for commercial purposes. The above information is an  only. It is not intended as medical advice for individual conditions or treatments. Talk to your doctor, nurse or pharmacist before following any medical regimen to see if it is safe and effective for you. GOALS OF CARE / TREATMENT PREFERENCES:   [====Goals of Care====]  GOALS OF CARE:  Patient / health care proxy stated goals: I've been in pain for so long, I'd like to keep it manageable    TREATMENT PREFERENCES:   Code Status:  [x] Attempt Resuscitation       [] Do Not Attempt Resuscitation    Advance Care Planning:  Advance Care Planning 3/23/2018   Patient's Healthcare Decision Maker is: Named in scanned ACP document   Primary Decision Maker Name Nadiya Mendoza   Primary Decision Maker Phone Number [phone]   Primary Decision Maker Relationship to Patient Spouse   Confirm Advance Directive Yes, on file   Does the patient have other document types Power of      The palliative care team has discussed with patient / health care proxy about goals of care / treatment preferences for patient.  [====Goals of Care====]     PRESCRIPTIONS GIVEN:   Previously gave info on Narcan and Narcan information sheet  No orders of the defined types were placed in this encounter.       FOLLOW UP:     Future Appointments  Date Time Provider Ameya Alfonso   5/22/2018 2:30 PM [address] [place] [address], NP [address]. IN CARE:   Patient Care Team:  Juanita Arellano MD as PCP - General (Family Practice)  Anisa Denny MD as Physician (Hematology)  Tamra German MD as Physician (Palliative Medicine)    Urology: Changed Nephrostomy tubes May 2018   Renal Dr. Antione Townsend at [facility]      HISTORY:   Nursing documentation from date of visit reviewed. Reviewed patient-completed ESAS and advance care planning form. Reviewed patient record in prescription monitoring program.    CHIEF COMPLAINT: Pain, Hiccups, new abdominal pain    HPI/SUBJECTIVE:    The patient is: [x] Verbal / [] Nonverbal     Stopped taking Methadone because he got constipated, thinks it was the culprit  Started back on Oxycontn  Has abdominal pain at baseline, but this is a new pain  While on Methadone, his pain was the same as when he was on Oxycontin  He is Ok with restarting it, but at 5mg for 1 week to see if his body tolerates it better  Also is going to start Miralax for his bowels  Is going to try and get to his oncologists office tomorrow- wants labs drawn  If he is unable to get there, he will let us know so we can draw in the home  Had discussion with Dr Jese Blakely on the phone while we were there  Left script for Oxycontin in case he is unable to tolerate the Methadone even at 5mg    [++++ Clinical Pain Assessment++++]  [++++Pain Severity++++]: Pain: 9  [++++Pain Character++++]: aching  [++++Pain Duration++++]: constant  [++++Pain Effect++++]: cannot bend over to put on socks/shoes etc...   [++++Pain Factors++++]: worse with being up  [++++Pain Frequency++++]: daily/constant, sometimes worse at night  [++++Pain Location++++]: pelvic / umbilicus region  [++++ Clinical Pain Assessment++++]    From Previous Visits:    Born in [facility] [place]  2 brothers and a sister; father ; mother is 80     2 daughters; [employer] kwame; [facility] [address] [facility] 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 2009 Apr; 24(7): [phone]482. 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 Its precise pathophysiology or etiology remains unclear. In addition to distinctive diverse skin manifestations, patients with polyfibromatosis have been previously reported to show erosive arthropathy with significant limitation of movement at affected joints. However, the presence of erosive polyarthropathy in polyfibromatosis has not emphasized in previous cases. Here, we report a case of polyfibromatosis syndrome combined with painless massive structural destruction of hand and foot joints, and review the characteristics of erosive arthropathy in previous cases. Metastatic esophageal cancer diagnosed 9/1/16  Had Chemotherapy and did well; responded  Dr. Iris Ramirez at [facility] follows him along with Dr. Don Wilson / Joseph Castrejon  May 2017 - creatinine level elevation  June 2017 - bilateral stents; right first and then left  August 2017 -stents didn't work so had nephrostomy tubes at [facility]   September 2017 -  started having bowel obstruction; small and large bowel  September 2 to [facility] and then September 12 to [facility] until September 27; then 3 days stay in [facility]  They found an inflammatory process throughout pelvis  When performed ileostomy had ascites; malignant ascites  Restarted chemotherapy; 5-FU; Oxaliplatin (itching/rash);  Herceptin  They are replacing Oxaliplatin with a different medication    Life has changed dramatically  Not happy with all the tubes; not easy to do anything   Pain is constant; rectal pain  Pain and discharge from the rectal area; slowly improving  Feels like has something lodged in rectum  Annoying sensation  Improved significantly; slow improvement but is improved  Was in very severe pain in rectum    He isn't where he wants to be  It is still an effort to do anything  Getting dressed with all the tubes is difficult  Can't really work in the yard  [address] [facility] can be restrictive  Old overalls; doesn't restrict    Diagnosed with 2 DVTs; severe swelling in both legs  Currently on Eloquis  Left knee is hard to bend; without pain and can only bend a particular amount    He has been on medications for chronic pain  But his entire life; he had had keloids that cause pain - burning and sharp severe pain  Found a doctor in [place]; chemo and steroid injections; pulse dye laser   Name Dr. Jon Lynn all the neuropathic medications  Contractures in his 35s of his hands  Surgeries on hands; Dr. Grace Lew  He has been on opioids chronically    Current pain regimen:  Oxycontin  60mg 2 tabs every 8 hours  Oxy IR 30mg  - averaging > 8-10/day regular     FUNCTIONAL ASSESSMENT:     Palliative Performance Scale (PPS):   PPS: 40     PSYCHOSOCIAL/SPIRITUAL SCREENING:     Any spiritual / Rastafari concerns:  [] Yes /  [x] No    Caregiver Burnout:  [] Yes /  [x] No /  [] No Caregiver Present      Anticipatory grief assessment:   [x] Normal  / [] Maladaptive       ESAS Anxiety: Anxiety: 3    ESAS Depression: Depression: 0     REVIEW OF SYSTEMS:     The following systems were [x] reviewed / [] unable to be reviewed  Systems: constitutional, ears/nose/mouth/throat, respiratory, gastrointestinal, genitourinary, musculoskeletal, integumentary, neurologic, psychiatric, endocrine. Positive findings noted below.   Modified ESAS Completed by: provider   Fatigue: 6     Depression: 0 Pain: 9   Anxiety: 3 Nausea: 7   Anorexia: 7 Dyspnea: 0     Constipation: Yes            PHYSICAL EXAM:     Wt Readings from Last 3 Encounters:   12/13/17 182 lb 9.6 oz (82.8 kg)   11/08/17 182 lb 9.6 oz (82.8 kg)   04/08/11 209 lb 14.1 oz (95.2 kg)     There were no vitals taken for this visit. - See nursing note, VS not done on this visit  Last bowel movement: See Nursing Note - ostomy/diarrhea    Constitutional: alert, oriented, fatigued, continues to lose weight by observation but wife also reported a 10 lb weight loss at Dr. Kameron Lao on 4/30/18  Eyes: pupils equal, anicteric  ENMT: no nasal discharge, moist mucous membranes  Cardiovascular:   Respiratory: breathing not labored, symmetric  Gastrointestinal: soft non-tender, ileostomy right lower quadrant, bilateral nephrostomy tubes in flank region; urine clear  : patient has fullness / firmness in suprapubic region, right > left w/o distinct adenopathy, + tenderness, no LAD in groin area, no bladder mass palpable   Musculoskeletal: no deformity, no tenderness to palpation, edema bilateral lower extremities through to thighs (right > left)  Skin: warm, dry  Neurologic: following commands, moving all extremities, contractures of hand bilaterally (prior surgical releases)  Psychiatric: full affect, no hallucinations  Other:     HISTORY:     Past Medical History:   Diagnosis Date    Other ill-defined conditions(609.52)     polyfibromitosis      Past Surgical History:   Procedure Laterality Date    HX ORTHOPAEDIC      hand surgery      No family history on file. History reviewed, no pertinent family history. Social History   Substance Use Topics    Smoking status: Never Smoker    Smokeless tobacco: Never Used    Alcohol use No     Allergies   Allergen Reactions    Levaquin [Levofloxacin] Nausea and Vomiting    Xanax [Alprazolam] Other (comments)     Possible delirium      Current Outpatient Prescriptions   Medication Sig    methadone (DOLOPHINE) 10 mg tablet Take 1 Tab by mouth three (3) times daily for 7 days. Max Daily Amount: 30 mg.  Indications: Chronic Pain    oxyCODONE IR (ROXICODONE) 30 mg immediate release tablet Take 2 Tabs by mouth every three (3) hours as needed for up to 15 days. Max Daily Amount: 480 mg. Up to 10 pills in 24 hours    naloxone [facility]) 4 mg/actuation nasal spray Use 1 spray intranasally into 1 nostril. Use a new Narcan nasal spray for subsequent doses and administer into alternating nostrils. May repeat every 2 to 3 minutes as needed. Indications: OPIATE-INDUCED RESPIRATORY DEPRESSION    ELIQUIS 5 mg tablet Take 1 Tab by mouth two (2) times a day for 30 days.  oxyCODONE 60 mg TR12 Take 120 mg by mouth every eight (8) hours for 30 days. Max Daily Amount: 360 mg. Indications: SEVERE PAIN WITH OPIOID TOLERANCE    diphenoxylate-atropine (LOMOTIL) 2.5-0.025 mg per tablet Take 1 Tab by mouth four (4) times daily as needed for Diarrhea.  ketamine (KETALAR) 5 mg/1 mL soln 5 mg/mL oral solution (compounded) Take 3 mL by mouth every six (6) hours.  dicyclomine (BENTYL) 10 mg capsule Take 1 Cap by mouth every four (4) hours as needed.  haloperidol (HALDOL) 2 mg/mL oral concentrate Take 1 mL by mouth every six (6) hours as needed. Indications: In case of Ketamine reaction     No current facility-administered medications for this visit.        LAB DATA REVIEWED:   Reviewed labs from Monday 4/30; creat now 2.5/rising    Labs 12/13/17: See Media; Creat 1.63 and Liver lesions smaller on CT     CONTROLLED SUBSTANCES SAFETY ASSESSMENT (IF ON CONTROLLED SUBSTANCES):   Not currently prescribing patient's opioid medications    Reviewed opioid safety handout:  [x] Yes   [] No  24 hour opioid dose >150mg morphine equivalent/day:  [x] Yes   [] No  Benzodiazepines:  [] Yes   [x] No  Sleep apnea:  [] Yes   [x] No  Urine Toxicology Testing within last 6 months:  [] Yes   [x] No  History of or new aberrant medication taking behaviors:  [] Yes   [x] No  Narcan prescribed: [x] Yes   [] No          Total time:   Counseling / coordination time:   > 50% counseling / coordination?:

## 2018-05-21 NOTE — PROGRESS NOTES
Palliative Medicine  Nursing Note  [phone]  Fax [phone]        Telephone Call  Patient Name: Thanh Hebert  YOB: 1960    5/21/2018      Advance Care Planning 3/23/2018   Patient's Healthcare Decision Maker is: Named in scanned ACP document   Primary Decision Maker Name Pretty    Primary Decision Maker Phone Number [phone]   Primary Decision Maker Relationship to Patient Spouse   Confirm Advance Directive Yes, on file   Does the patient have other document types Power of      Spoke to Giovanni Elissa this morning, she is very concerned about Larry Brumfield and stayed home from work today to be with him. She said that the hiccups he has been having caused him to throw up and it was brown liquid. He is not eating very much at all and when he does with minutes to an hour, in her words he is miserable, she said you can look at him and see how miserable he is. She really wants someone to see him today, Kentrell Engel is scheduled to see him tomorrow but she said he is different even from when they saw him on Thursday. She did say that right this minute he is comfortable but she does not feel this is going to last very long. E-mailed Dr. Case Hernandez, she will place a call to her this morning.       MARINA GarciaN, RN, OCN, VIA [facility]  Palliative Medicine

## 2018-05-21 NOTE — PATIENT INSTRUCTIONS
Dear Yisle Leslie ,    It was a pleasure seeing you at home today. This is what we talked about:     Note; I reviewed Dr. Urszula Jaramillo notes in our files which review your current cancer status and treatment plan from 5/1/18. 1. 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    -Start Methadone 5mg every 8 hours  -I have left a script for Oxycontin if you are unable to tolerate the methadone at 5mg, but please let us know if you want to stop it  -Remember that with any change in therapy, it may take a little time to realize the maximum affect  -We cannot increase the dose of methadone sooner than every 5-7 days for safety reasons  -Start the methadone right away so that you have Oxycontin left over just in case you don't respond to this well    -You are also taking Tylenol 500mg - we talked about taking no more than 6 tablets or 3000mg in 24 hours of Tylenol  -You haven't used the Ketamine 2-3ml (10-15mg) recently but you may want to restart this given the pain is bad right now  -We talked about opioid safety and I prescribed Naloxone nasal spray in case of an emergency/accidental overdose    2.  Advance Care Planning  -At this time you want to continue with the current treatment approach  -We reviewed your values/goals - you would not want to live as a vegetable  -Your Advance Directive is consistent with your values and goals as you expressed today  -We talked about resuscitation  -You elected to remain as a full code status at this time  -We fully support you in this decision     This is what you have shared with us about Alban Thomas. Planning 3/23/2018   Patient's Healthcare Decision Maker is: Named in scanned ACP document   Primary Decision Maker Name Vicki Zuniga   Primary Decision Maker Phone Number [phone]   Primary Decision Maker Relationship to Patient Spouse   Confirm Advance Directive Yes, on file   Does the patient have other document types Power of      The Palliative Medicine Team is here to support you and your family. We will see you again in 5 days    If there are any concerns before that time, such as medication questions, worsening symptoms or a need to see a physician for an urgent or emergent situation; please call [phone] (COPE). A physician is also on call after our normal business hours of 8am to 5pm.     In order to serve you better, please allow up to 48 hours for prescription refills to be processed. Certain medications may require more paperwork or a written prescription that you may need to  from the office. We appreciate you letting us know of any refill requests as soon as possible. We also would like you to sign up for [facility] as well. Sincerely,      Lizzeth Mendiola NP and the Palliative Medicine Team            Methadone (By mouth)   Methadone (METH-a-done)  Treats moderate to severe pain and narcotic drug addiction. Brand Name(s): Diskets Dispersible, Dolophine HCl, Methadone HCl Intensol, Methadose   There may be other brand names for this medicine. When This Medicine Should Not Be Used: This medicine is not right for everyone. Do not use it if you had an allergic reaction to methadone, or if you have severe breathing problems or paralytic ileus. How to Use This Medicine:   Liquid, Tablet, Tablet for Suspension  · Take your medicine as directed. Your dose may need to be changed several times to find what works best for you. · An overdose can be dangerous. Follow directions carefully so you do not get too much medicine at one time. · Measure the oral liquid medicine with a marked measuring spoon, oral syringe, or medicine cup. · Oral liquid: Mix the with 2 tablespoons of liquid (unless your doctor tells you differently). Drink the medicine right away. · Tablet for suspension: Mix it with water or another liquid, then drink the mixture right away.  Do not swallow the tablet, and do not use it without mixing it in liquid first.  · Tablet: Swallow it whole. Do not crush, break, chew, or dissolve it. · This medicine should come with a Medication Guide. Ask your pharmacist for a copy if you do not have one. · Missed dose:   ¨ For pain: Take a dose as soon as you remember. If it is almost time for your next dose, wait until then and take a regular dose. Do not take extra medicine to make up for a missed dose. ¨ For narcotic drug addiction: If you miss a dose, take your next dose the following day as scheduled. · Store the medicine in a closed container at room temperature, away from heat, moisture, and direct light. Store the oral liquid at room temperature or in the refrigerator. Do not freeze. Store the medicine in a safe and secure place. Do not throw unused medicine in the trash. Ask your pharmacist about the best way to dispose of medicine you do not use. Drugs and Foods to Avoid:   Ask your doctor or pharmacist before using any other medicine, including over-the-counter medicines, vitamins, and herbal products. · Do not use this medicine if you are using or have used an MAO inhibitor within the past 14 days. · Some medicines can affect how methadone works. Tell your doctor if you are using any of the following:   ¨ Carbamazepine, desipramine, didanosine, erythromycin, fluconazole, fluvoxamine, ketoconazole, mirtazapine, phenobarbital, phenytoin, rifampin, ritonavir, sertraline, stavudine, Sejal's wort, telaprevir, tramadol, trazodone, voriconazole, or zidovudine  ¨ Blood pressure medicine  ¨ Diuretic (water pill)  ¨ Medicine to treat depression  ¨ Medicine to treat heart rhythm problems  ¨ Phenothiazine medicine  ¨ Triptan medicine to treat migraine headaches  · Do not drink alcohol while you are using this medicine. · Tell your doctor if you use anything else that makes you sleepy. Some examples are allergy medicine, narcotic pain medicine, and alcohol.  Tell your doctor if you are also using buprenorphine, butorphanol, nalbuphine, pentazocine, a benzodiazepine, or a muscle relaxer. Warnings While Using This Medicine:   · Tell your doctor if you are pregnant or breastfeeding, or if you have kidney disease, liver disease, heart disease, heart rhythm problems (such as long QT syndrome), breathing or lung problems (such as asthma or COPD), gallbladder problems, pancreas problems, stomach or bowel problems, or trouble urinating. Tell your doctor if you have a history of head injury, brain tumor, seizures, depression, or alcohol or drug addiction. · This medicine may cause the following problems:  ¨ High risk of overdose, which can lead to death  ¨ Respiratory depression (serious breathing problem that can be life-threatening)  ¨ Heart rhythm problems  ¨ Serotonin syndrome (when used with certain medicines)  · This medicine can be habit-forming. Do not use more than your prescribed dose. Call your doctor if you think your medicine is not working. · Do not stop using this medicine suddenly. Your doctor will need to slowly decrease your dose before you stop it completely. · This medicine may make you dizzy, drowsy, or lightheaded. Do not drive or do anything else that could be dangerous until you know how this medicine affects you. Sit or lie down if you feel dizzy. Stand up carefully. · This medicine may cause constipation, especially with long-term use. Ask your doctor if you should use a laxative to prevent and treat constipation. · Tell any doctor or dentist who treats you that you are using this medicine. This medicine may affect certain medical test results. · This medicine could cause infertility. Talk with your doctor before using this medicine if you plan to have children. · Keep all medicine out of the reach of children. Never share your medicine with anyone.   Possible Side Effects While Using This Medicine:   Call your doctor right away if you notice any of these side effects:  · Allergic reaction: Itching or hives, swelling in your face or hands, swelling or tingling in your mouth or throat, chest tightness, trouble breathing  · Anxiety, restlessness, fever, muscle spasms, twitching, diarrhea, seeing or hearing things that are not there  · Blue lips, fingernails, or skin  · Extreme dizziness or weakness, shallow breathing, slow or uneven heartbeat, sweating, seizures, cold or clammy skin  · Fast, pounding, or uneven heartbeat  · Severe confusion, lightheadedness, dizziness, fainting  · Severe constipation, stomach pain, nausea, or vomiting  · Trouble breathing or slow breathing  If you notice these less serious side effects, talk with your doctor:   · Mild constipation, nausea, or vomiting  · Mild sleepiness or tiredness  If you notice other side effects that you think are caused by this medicine, tell your doctor. Call your doctor for medical advice about side effects. You may report side effects to FDA at 8-006-FDA-7384  © 2017 [facility] Information is for End User's use only and may not be sold, redistributed or otherwise used for commercial purposes. The above information is an  only. It is not intended as medical advice for individual conditions or treatments. Talk to your doctor, nurse or pharmacist before following any medical regimen to see if it is safe and effective for you.

## 2018-05-23 NOTE — PROGRESS NOTES
Palliative Medicine  Nursing Note  [phone]  Fax [phone]        Clinic Office Visit  Patient Name: Nilesh Coronado  YOB: 1960    5/23/2018      Advance Care Planning 3/23/2018   Patient's Healthcare Decision Maker is: Named in scanned ACP document   Primary Decision Maker Name Marlene Primrose   Primary Decision Maker Phone Number [phone]   Primary Decision Maker Relationship to Patient Spouse   Confirm Advance Directive Yes, on file   Does the patient have other document types Power of      Mr. Chester Ruffin went to the ED today at [facility] due to the fact that his nephrostomy tube fell out this morning. He was taken there by ambulance. Roxana Primrose rode with him. She has communicated with the PM nurse by e-mail today. Here are the updates received from her:  \"CT scan was done and it showed ascites. Benjamins colleague Dr. Raman Lopes saw Claudia Brody and recommended their [facility] Med nurse see him to talk pain control and Christel Melgoza said she thinks they said they would bring him in to work on pain, but Ill get more clarity on that, and Christel Melgoza will also ask the PM nurse to confer with Dr. Charleen Farooq. Raman Lopes stated to Christel Melgoza that she knows Dr Charleen Farooq. Claudia Brody is in IR procedure now. He is definitely being admitted. \"            Gloria Phillips, MARINAN, RN, OCN, VIA [facility]  Palliative Medicine

## 2018-05-23 NOTE — TELEPHONE ENCOUNTER
Spoke with Maksim Snyder. She needs to take Mr. Gatito Mendoza to the ED to have his nephrostomy tubes replaced. He fell out this am when he got up to empty them. She was not sure which ED to take him to, [facility] or [facility] so she wanted PM advise. Email sent to Dr. Larissa Bravo who felt [facility] was best because they are most familiar with Mr. Gatito Mendoza. Ina Villalobos also had a call out to Dr. Chao Marcial who felt that [facility] was the best place as well. She was not sure if she should call EMS to transport him or take him in her car so PM nurse and Ina Villalobos talked this through as well. She was going to call the non-emergent [facility] number to see what her options are in regards to that. She may call Dr. Chao Marcial back to see if they could make a stat appointment with IR directly and avoid the ED visit, but she was going to discuss this with Mr. Gatito Mendoza and assess his pain first in case he felt he may need pain meds first.  She will call PM nurse to let us know what she decides.     Lucia Forde, RN  Palliative Medicine

## 2018-05-23 NOTE — TELEPHONE ENCOUNTER
Mrs. Ramone Mabry is calling to speak to Tonia Gain or Dr. Soriano Smoke. One of patient's tubes in back came out last night and she wants to know which ER to go to  [address] [place] [address] or [facility].     Francisco Javier Burr

## 2018-05-24 NOTE — TELEPHONE ENCOUNTER
Spoke with Chip Sensing , advised LPN is aware rx was denied for  Subsys 400 mcg #180, per insurance for this dose no more than #90 is allowed per month , if patient get 100 mcg he can receive a quantity of #120, will discuss with physician to determine reorder of prescription and quantity

## 2018-05-24 NOTE — TELEPHONE ENCOUNTER
Diane Vee is calling to advise that prior authorization for medication subsys was denied. Is calling to see if MD wants to appeal or prescribe something else. Advised nurse would call him back.

## 2018-05-25 NOTE — PATIENT INSTRUCTIONS
Dear Elle Leggett ,    It was a pleasure seeing you at home today. This is what we talked about:     1. 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 Advance Care Planning (discussed at previous visit)  -At this time you want to continue with the current treatment approach  -We reviewed your values/goals - you would not want to live as a vegetable  -Your Advance Directive is consistent with your values and goals as you expressed today  -We talked about resuscitation  -You elected to remain as a full code status at this time  -We fully support you in this decision     This is what you have shared with us about Mattcruz Phan Thomas. Planning 3/23/2018   Patient's Healthcare Decision Maker is: Named in scanned ACP document   Primary Decision Maker Name Laurie Mckinnon   Primary Decision Maker Phone Number [phone]   Primary Decision Maker Relationship to Patient Spouse   Confirm Advance Directive Yes, on file   Does the patient have other document types Power of      The Palliative Medicine Team is here to support you and your family. We will see you again in 2-3 weeks    If there are any concerns before that time, such as medication questions, worsening symptoms or a need to see a physician for an urgent or emergent situation; please call [phone] (COPE).  A physician is also on call after our normal business hours of 8am to 5pm.     In order to serve you better, please allow up to 48 hours for prescription refills to be processed. Certain medications may require more paperwork or a written prescription that you may need to  from the office. We appreciate you letting us know of any refill requests as soon as possible. We also would like you to sign up for [facility] as well. Sincerely,      Foreign Chase NP and the Palliative Medicine Team            Methadone (By mouth)   Methadone (METH-a-done)  Treats moderate to severe pain and narcotic drug addiction. Brand Name(s): Diskets Dispersible, Dolophine HCl, Methadone HCl Intensol, Methadose   There may be other brand names for this medicine. When This Medicine Should Not Be Used: This medicine is not right for everyone. Do not use it if you had an allergic reaction to methadone, or if you have severe breathing problems or paralytic ileus. How to Use This Medicine:   Liquid, Tablet, Tablet for Suspension  · Take your medicine as directed. Your dose may need to be changed several times to find what works best for you. · An overdose can be dangerous. Follow directions carefully so you do not get too much medicine at one time. · Measure the oral liquid medicine with a marked measuring spoon, oral syringe, or medicine cup. · Oral liquid: Mix the with 2 tablespoons of liquid (unless your doctor tells you differently). Drink the medicine right away. · Tablet for suspension: Mix it with water or another liquid, then drink the mixture right away. Do not swallow the tablet, and do not use it without mixing it in liquid first.  · Tablet: Swallow it whole. Do not crush, break, chew, or dissolve it. · This medicine should come with a Medication Guide. Ask your pharmacist for a copy if you do not have one. · Missed dose:   ¨ For pain: Take a dose as soon as you remember. If it is almost time for your next dose, wait until then and take a regular dose.  Do not take extra medicine to make up for a missed dose.  ¨ For narcotic drug addiction: If you miss a dose, take your next dose the following day as scheduled. · Store the medicine in a closed container at room temperature, away from heat, moisture, and direct light. Store the oral liquid at room temperature or in the refrigerator. Do not freeze. Store the medicine in a safe and secure place. Do not throw unused medicine in the trash. Ask your pharmacist about the best way to dispose of medicine you do not use. Drugs and Foods to Avoid:   Ask your doctor or pharmacist before using any other medicine, including over-the-counter medicines, vitamins, and herbal products. · Do not use this medicine if you are using or have used an MAO inhibitor within the past 14 days. · Some medicines can affect how methadone works. Tell your doctor if you are using any of the following:   ¨ Carbamazepine, desipramine, didanosine, erythromycin, fluconazole, fluvoxamine, ketoconazole, mirtazapine, phenobarbital, phenytoin, rifampin, ritonavir, sertraline, stavudine, Sejal's wort, telaprevir, tramadol, trazodone, voriconazole, or zidovudine  ¨ Blood pressure medicine  ¨ Diuretic (water pill)  ¨ Medicine to treat depression  ¨ Medicine to treat heart rhythm problems  ¨ Phenothiazine medicine  ¨ Triptan medicine to treat migraine headaches  · Do not drink alcohol while you are using this medicine. · Tell your doctor if you use anything else that makes you sleepy. Some examples are allergy medicine, narcotic pain medicine, and alcohol. Tell your doctor if you are also using buprenorphine, butorphanol, nalbuphine, pentazocine, a benzodiazepine, or a muscle relaxer.   Warnings While Using This Medicine:   · Tell your doctor if you are pregnant or breastfeeding, or if you have kidney disease, liver disease, heart disease, heart rhythm problems (such as long QT syndrome), breathing or lung problems (such as asthma or COPD), gallbladder problems, pancreas problems, stomach or bowel problems, or trouble urinating. Tell your doctor if you have a history of head injury, brain tumor, seizures, depression, or alcohol or drug addiction. · This medicine may cause the following problems:  ¨ High risk of overdose, which can lead to death  ¨ Respiratory depression (serious breathing problem that can be life-threatening)  ¨ Heart rhythm problems  ¨ Serotonin syndrome (when used with certain medicines)  · This medicine can be habit-forming. Do not use more than your prescribed dose. Call your doctor if you think your medicine is not working. · Do not stop using this medicine suddenly. Your doctor will need to slowly decrease your dose before you stop it completely. · This medicine may make you dizzy, drowsy, or lightheaded. Do not drive or do anything else that could be dangerous until you know how this medicine affects you. Sit or lie down if you feel dizzy. Stand up carefully. · This medicine may cause constipation, especially with long-term use. Ask your doctor if you should use a laxative to prevent and treat constipation. · Tell any doctor or dentist who treats you that you are using this medicine. This medicine may affect certain medical test results. · This medicine could cause infertility. Talk with your doctor before using this medicine if you plan to have children. · Keep all medicine out of the reach of children. Never share your medicine with anyone.   Possible Side Effects While Using This Medicine:   Call your doctor right away if you notice any of these side effects:  · Allergic reaction: Itching or hives, swelling in your face or hands, swelling or tingling in your mouth or throat, chest tightness, trouble breathing  · Anxiety, restlessness, fever, muscle spasms, twitching, diarrhea, seeing or hearing things that are not there  · Blue lips, fingernails, or skin  · Extreme dizziness or weakness, shallow breathing, slow or uneven heartbeat, sweating, seizures, cold or clammy skin  · Fast, pounding, or uneven heartbeat  · Severe confusion, lightheadedness, dizziness, fainting  · Severe constipation, stomach pain, nausea, or vomiting  · Trouble breathing or slow breathing  If you notice these less serious side effects, talk with your doctor:   · Mild constipation, nausea, or vomiting  · Mild sleepiness or tiredness  If you notice other side effects that you think are caused by this medicine, tell your doctor. Call your doctor for medical advice about side effects. You may report side effects to FDA at 4-587-FDA-5295  © 2017 [address] Information is for End User's use only and may not be sold, redistributed or otherwise used for commercial purposes. The above information is an  only. It is not intended as medical advice for individual conditions or treatments. Talk to your doctor, nurse or pharmacist before following any medical regimen to see if it is safe and effective for you.

## 2018-05-25 NOTE — PROGRESS NOTES
Palliative Medicine Outpatient Services  [place]: [phone])    Patient Name: Adelia Dawson  YOB: 1960    Date of Current Visit: 5/22/18   Location of Current Visit:    [x] HOME    Date of Initial Visit: 11/8/17  Requesting Physician: Dr. Araceli Barrios  Primary Care Physician: Sara Mauricio MD      SUMMARY:   Adelia Dawson is a 60y.o. year old with a past history of polyfibrosis syndrome (see below; contractures, keloids, retroperitoneal fibrosis) and esophageal cancer with liver mets diagnosed in 2016 now s/p bilateral nephrostomy tubes and ileostomy as well as DVT now with extensive lower extremity swelling, who was referred to Palliative Medicine by Radha Winchester for unmet palliative care needs. The patients social history includes (see below). Palliative Medicine is addressing the following current patient/family concerns: intensity of life changing illness/procedures, pain and caregiver support. PALLIATIVE DIAGNOSES:       ICD-10-CM ICD-9-CM    1. Abdominal pain, generalized R10.84 789.07    2. Nausea and vomiting, intractability of vomiting not specified, unspecified vomiting type R11.2 787.01    3. Pelvic pain R10.2 [id number]    4. Fatigue, unspecified type R53.83 780.79    5. Chronic rectal pain K62.89 569.42     G89.29 338.29    6. Scrotal pain N50.82 608.9    7. Back pain, unspecified back location, unspecified back pain laterality, unspecified chronicity M54.9 724.5       PLAN:     Patient Instructions     Dear Adelia Dawson ,    It was a pleasure seeing you at home today. This is what we talked about:     1.  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 Advance Care Planning (discussed at previous visit)  -At this time you want to continue with the current treatment approach  -We reviewed your values/goals - you would not want to live as a vegetable  -Your Advance Directive is consistent with your values and goals as you expressed today  -We talked about resuscitation  -You elected to remain as a full code status at this time  -We fully support you in this decision     This is what you have shared with us about [address] 78. Planning 3/23/2018   Patient's Healthcare Decision Maker is: Named in scanned ACP document   Primary Decision Maker Name Arden Carmona   Primary Decision Maker Phone Number [phone]   Primary Decision Maker Relationship to Patient Spouse   Confirm Advance Directive Yes, on file   Does the patient have other document types Power of      The Palliative Medicine Team is here to support you and your family. We will see you again in 2-3 weeks    If there are any concerns before that time, such as medication questions, worsening symptoms or a need to see a physician for an urgent or emergent situation; please call [phone] (COPE). A physician is also on call after our normal business hours of 8am to 5pm.     In order to serve you better, please allow up to 48 hours for prescription refills to be processed. Certain medications may require more paperwork or a written prescription that you may need to  from the office. We appreciate you letting us know of any refill requests as soon as possible. We also would like you to sign up for [employer] as well.     Sincerely,      Natacha Chaudhry NP and the Palliative Medicine Team            Methadone (By mouth)   Methadone (METH-a-done)  Treats moderate to severe pain and narcotic drug addiction. Brand Name(s): Diskets Dispersible, Dolophine HCl, Methadone HCl Intensol, Methadose   There may be other brand names for this medicine. When This Medicine Should Not Be Used: This medicine is not right for everyone. Do not use it if you had an allergic reaction to methadone, or if you have severe breathing problems or paralytic ileus. How to Use This Medicine:   Liquid, Tablet, Tablet for Suspension  · Take your medicine as directed. Your dose may need to be changed several times to find what works best for you. · An overdose can be dangerous. Follow directions carefully so you do not get too much medicine at one time. · Measure the oral liquid medicine with a marked measuring spoon, oral syringe, or medicine cup. · Oral liquid: Mix the with 2 tablespoons of liquid (unless your doctor tells you differently). Drink the medicine right away. · Tablet for suspension: Mix it with water or another liquid, then drink the mixture right away. Do not swallow the tablet, and do not use it without mixing it in liquid first.  · Tablet: Swallow it whole. Do not crush, break, chew, or dissolve it. · This medicine should come with a Medication Guide. Ask your pharmacist for a copy if you do not have one. · Missed dose:   ¨ For pain: Take a dose as soon as you remember. If it is almost time for your next dose, wait until then and take a regular dose. Do not take extra medicine to make up for a missed dose. ¨ For narcotic drug addiction: If you miss a dose, take your next dose the following day as scheduled. · Store the medicine in a closed container at room temperature, away from heat, moisture, and direct light. Store the oral liquid at room temperature or in the refrigerator. Do not freeze. Store the medicine in a safe and secure place. Do not throw unused medicine in the trash. Ask your pharmacist about the best way to dispose of medicine you do not use.   Drugs and Foods to Avoid:   Ask your doctor or pharmacist before using any other medicine, including over-the-counter medicines, vitamins, and herbal products. · Do not use this medicine if you are using or have used an MAO inhibitor within the past 14 days. · Some medicines can affect how methadone works. Tell your doctor if you are using any of the following:   ¨ Carbamazepine, desipramine, didanosine, erythromycin, fluconazole, fluvoxamine, ketoconazole, mirtazapine, phenobarbital, phenytoin, rifampin, ritonavir, sertraline, stavudine, Sejal's wort, telaprevir, tramadol, trazodone, voriconazole, or zidovudine  ¨ Blood pressure medicine  ¨ Diuretic (water pill)  ¨ Medicine to treat depression  ¨ Medicine to treat heart rhythm problems  ¨ Phenothiazine medicine  ¨ Triptan medicine to treat migraine headaches  · Do not drink alcohol while you are using this medicine. · Tell your doctor if you use anything else that makes you sleepy. Some examples are allergy medicine, narcotic pain medicine, and alcohol. Tell your doctor if you are also using buprenorphine, butorphanol, nalbuphine, pentazocine, a benzodiazepine, or a muscle relaxer. Warnings While Using This Medicine:   · Tell your doctor if you are pregnant or breastfeeding, or if you have kidney disease, liver disease, heart disease, heart rhythm problems (such as long QT syndrome), breathing or lung problems (such as asthma or COPD), gallbladder problems, pancreas problems, stomach or bowel problems, or trouble urinating. Tell your doctor if you have a history of head injury, brain tumor, seizures, depression, or alcohol or drug addiction. · This medicine may cause the following problems:  ¨ High risk of overdose, which can lead to death  ¨ Respiratory depression (serious breathing problem that can be life-threatening)  ¨ Heart rhythm problems  ¨ Serotonin syndrome (when used with certain medicines)  · This medicine can be habit-forming.  Do not use more than your prescribed dose. Call your doctor if you think your medicine is not working. · Do not stop using this medicine suddenly. Your doctor will need to slowly decrease your dose before you stop it completely. · This medicine may make you dizzy, drowsy, or lightheaded. Do not drive or do anything else that could be dangerous until you know how this medicine affects you. Sit or lie down if you feel dizzy. Stand up carefully. · This medicine may cause constipation, especially with long-term use. Ask your doctor if you should use a laxative to prevent and treat constipation. · Tell any doctor or dentist who treats you that you are using this medicine. This medicine may affect certain medical test results. · This medicine could cause infertility. Talk with your doctor before using this medicine if you plan to have children. · Keep all medicine out of the reach of children. Never share your medicine with anyone. Possible Side Effects While Using This Medicine:   Call your doctor right away if you notice any of these side effects:  · Allergic reaction: Itching or hives, swelling in your face or hands, swelling or tingling in your mouth or throat, chest tightness, trouble breathing  · Anxiety, restlessness, fever, muscle spasms, twitching, diarrhea, seeing or hearing things that are not there  · Blue lips, fingernails, or skin  · Extreme dizziness or weakness, shallow breathing, slow or uneven heartbeat, sweating, seizures, cold or clammy skin  · Fast, pounding, or uneven heartbeat  · Severe confusion, lightheadedness, dizziness, fainting  · Severe constipation, stomach pain, nausea, or vomiting  · Trouble breathing or slow breathing  If you notice these less serious side effects, talk with your doctor:   · Mild constipation, nausea, or vomiting  · Mild sleepiness or tiredness  If you notice other side effects that you think are caused by this medicine, tell your doctor.    Call your doctor for medical advice about side effects. You may report side effects to FDA at 1-637-OCR-0713  © 2017 [facility] Information is for End User's use only and may not be sold, redistributed or otherwise used for commercial purposes. The above information is an  only. It is not intended as medical advice for individual conditions or treatments. Talk to your doctor, nurse or pharmacist before following any medical regimen to see if it is safe and effective for you. GOALS OF CARE / TREATMENT PREFERENCES:   [====Goals of Care====]  GOALS OF CARE:  Patient / health care proxy stated goals: I've been in pain for so long, I'd like to keep it manageable    TREATMENT PREFERENCES:   Code Status:  [x] Attempt Resuscitation       [] Do Not Attempt Resuscitation    Advance Care Planning:  Advance Care Planning 3/23/2018   Patient's Healthcare Decision Maker is: Named in scanned ACP document   Primary Decision Maker Name Hui Connell   Primary Decision Maker Phone Number [phone]   Primary Decision Maker Relationship to Patient Spouse   Confirm Advance Directive Yes, on file   Does the patient have other document types Power of      The palliative care team has discussed with patient / health care proxy about goals of care / treatment preferences for patient.  [====Goals of Care====]     PRESCRIPTIONS GIVEN:   Previously gave info on Narcan and Narcan information sheet  No orders of the defined types were placed in this encounter. FOLLOW UP:     No future appointments. PHYSICIANS INVOLVED IN CARE:   Patient Care Team:  Curt Kendrick MD as PCP - General (Family Practice)  Jovita Newman MD as Physician (Hematology)  Sugey Arellano MD as Physician (Palliative Medicine)    Urology: Changed Nephrostomy tubes May 2018   Ren[facility] Dr. Dinesh Vickers at [facility]      HISTORY:   Nursing documentation from date of visit reviewed.   Reviewed patient-completed ESAS and advance care planning form.  Reviewed patient record in prescription monitoring program.    CHIEF COMPLAINT: I feel great! HPI/SUBJECTIVE:    The patient is: [x] Verbal / [] Nonverbal     Doing much better since medication changes over the weekend  [facility] came and gave fluids as well  We talked about how to use Subsys when he received it  He is feeling better today, so is reconsidering chemo  I strongly recommended he have a risk/benefit conversation with Dr Case Hernandez if he wanted to talk about this more  He agreed that this was a good idea    [++++ Clinical Pain Assessment++++]  [++++Pain Severity++++]: Pain: 4  [++++Pain Character++++]: aching/pressure  [++++Pain Duration++++]: constant  [++++Pain Effect++++]: cannot bend over to put on socks/shoes etc... 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 2009; 24(2): [phone]. 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 Its precise pathophysiology or etiology remains unclear.  In addition to distinctive diverse skin manifestations, patients with polyfibromatosis have been previously reported to show erosive arthropathy with significant limitation of movement at affected joints. However, the presence of erosive polyarthropathy in polyfibromatosis has not emphasized in previous cases. Here, we report a case of polyfibromatosis syndrome combined with painless massive structural destruction of hand and foot joints, and review the characteristics of erosive arthropathy in previous cases. Metastatic esophageal cancer diagnosed 9/1/16  Had Chemotherapy and did well; responded  Dr. Cinthya Coronado at [facility] follows him along with Dr. Marisela Peacock / Kristian Asp  May 2017 - creatinine level elevation  June 2017 - bilateral stents; right first and then left  August 2017 -stents didn't work so had nephrostomy tubes at [facility]   September 2017 -  started having bowel obstruction; small and large bowel  September 2 to [facility] and then September 12 to [facility] until September 27; then 3 days stay in [facility]  They found an inflammatory process throughout pelvis  When performed ileostomy had ascites; malignant ascites  Restarted chemotherapy; 5-FU; Oxaliplatin (itching/rash);  Herceptin  They are replacing Oxaliplatin with a different medication    Life has changed dramatically  Not happy with all the tubes; not easy to do anything   Pain is constant; rectal pain  Pain and discharge from the rectal area; slowly improving  Feels like has something lodged in rectum  Annoying sensation  Improved significantly; slow improvement but is improved  Was in very severe pain in rectum    He isn't where he wants to be  It is still an effort to do anything  Getting dressed with all the tubes is difficult  Can't really work in the yard  Clothes can be restrictive  Old overalls; doesn't restrict    Diagnosed with 2 DVTs; severe swelling in both legs  Currently on Eloquis  Left knee is hard to bend; without pain and can only bend a particular amount    He has been on medications for chronic pain  But his entire life; he had had keloids that cause pain - burning and sharp severe pain  Found a doctor in [place]; chemo and steroid injections; pulse dye laser   Name Dr. Sabrina Nagel all the neuropathic medications  Contractures in his 35s of his hands  Surgeries on hands; Dr. James Olson  He has been on opioids chronically         FUNCTIONAL ASSESSMENT:     Palliative Performance Scale (PPS):   PPS: 40     PSYCHOSOCIAL/SPIRITUAL SCREENING:     Any spiritual / Hinduism concerns:  [] Yes /  [x] No    Caregiver Burnout:  [] Yes /  [x] No /  [] No Caregiver Present      Anticipatory grief assessment:   [x] Normal  / [] Maladaptive       ESAS Anxiety: Anxiety: 0    ESAS Depression: Depression: 0     REVIEW OF SYSTEMS:     The following systems were [x] reviewed / [] unable to be reviewed  Systems: constitutional, ears/nose/mouth/throat, respiratory, gastrointestinal, genitourinary, musculoskeletal, integumentary, neurologic, psychiatric, endocrine. Positive findings noted below. Modified ESAS Completed by: provider   Fatigue: 0     Depression: 0 Pain: 4   Anxiety: 0 Nausea: 2   Anorexia: 2 Dyspnea: 0     Constipation: No            PHYSICAL EXAM:     Wt Readings from Last 3 Encounters:   12/13/17 182 lb 9.6 oz (82.8 kg)   11/08/17 182 lb 9.6 oz (82.8 kg)   04/08/11 209 lb 14.1 oz (95.2 kg)     There were no vitals taken for this visit.  - See nursing note  Last bowel movement: See Nursing Note     Constitutional: alert, oriented, more animated and happy  Eyes: pupils equal, anicteric  ENMT: no nasal discharge, moist mucous membranes  Cardiovascular:   Respiratory: breathing not labored, symmetric  Gastrointestinal: soft non-tender, ileostomy right lower quadrant, bilateral nephrostomy tubes in flank region; urine clear  : patient has fullness / firmness in suprapubic region, right > left w/o distinct adenopathy, + tenderness, no LAD in groin area, no bladder mass palpable Musculoskeletal: no deformity, no tenderness to palpation, edema bilateral lower extremities through to thighs (right > left)  Skin: warm, dry  Neurologic: following commands, moving all extremities, contractures of hand bilaterally (prior surgical releases)  Psychiatric: full affect, no hallucinations  Other:     HISTORY:     Past Medical History:   Diagnosis Date    Other ill-defined conditions(799.89)     polyfibromitosis      Past Surgical History:   Procedure Laterality Date    HX ORTHOPAEDIC      hand surgery      No family history on file. History reviewed, no pertinent family history. Social History   Substance Use Topics    Smoking status: Never Smoker    Smokeless tobacco: Never Used    Alcohol use No     Allergies   Allergen Reactions    Levaquin [Levofloxacin] Nausea and Vomiting    Xanax [Alprazolam] Other (comments)     Possible delirium      Current Outpatient Prescriptions   Medication Sig    fentaNYL (SUBSYS) 100 mcg/spray spry 100 mcg by SubLINGual route every four (4) hours as needed. Max Daily Amount: 600 mcg. Indications: Breakthrough Cancer Pain in Opioid-Tolerant Patient    fentaNYL (SUBSYS) 400 mcg/spray spry 400 mcg by SubLINGual route every four (4) hours as needed. Max Daily Amount: 2,400 mcg.  naloxone (NARCAN) 4 mg/actuation nasal spray Use 1 spray intranasally into 1 nostril. Use a new Narcan nasal spray for subsequent doses and administer into alternating nostrils. May repeat every 2 to 3 minutes as needed. Indications: OPIATE-INDUCED RESPIRATORY DEPRESSION    ELIQUIS 5 mg tablet Take 1 Tab by mouth two (2) times a day for 30 days.  diphenoxylate-atropine (LOMOTIL) 2.5-0.025 mg per tablet Take 1 Tab by mouth four (4) times daily as needed for Diarrhea.  ketamine (KETALAR) 5 mg/1 mL soln 5 mg/mL oral solution (compounded) Take 3 mL by mouth every six (6) hours.  dicyclomine (BENTYL) 10 mg capsule Take 1 Cap by mouth every four (4) hours as needed.     haloperidol (HALDOL) 2 mg/mL oral concentrate Take 1 mL by mouth every six (6) hours as needed. Indications: In case of Ketamine reaction     No current facility-administered medications for this visit.        LAB DATA REVIEWED:   Reviewed labs from Monday 4/30; creat now 2.5/rising    Labs 12/13/17: See Media; Creat 1.63 and Liver lesions smaller on CT     CONTROLLED SUBSTANCES SAFETY ASSESSMENT (IF ON CONTROLLED SUBSTANCES):   Not currently prescribing patient's opioid medications    Reviewed opioid safety handout:  [x] Yes   [] No  24 hour opioid dose >150mg morphine equivalent/day:  [x] Yes   [] No  Benzodiazepines:  [] Yes   [x] No  Sleep apnea:  [] Yes   [x] No  Urine Toxicology Testing within last 6 months:  [] Yes   [x] No  History of or new aberrant medication taking behaviors:  [] Yes   [x] No  Narcan prescribed: [x] Yes   [] No          Total time:   Counseling / coordination time:   > 50% counseling / coordination?:

## 2018-05-26 PROBLEM — C15.9 ESOPHAGEAL CANCER, STAGE IV (HCC): Status: ACTIVE | Noted: 2018-01-01

## 2018-05-26 PROBLEM — R10.9 INTRACTABLE ABDOMINAL PAIN: Status: ACTIVE | Noted: 2018-01-01

## 2018-05-26 PROBLEM — F41.8 ANXIETY ABOUT HEALTH: Status: ACTIVE | Noted: 2018-01-01

## 2018-05-26 PROBLEM — N13.5 RETROPERITONEAL FIBROSIS: Status: ACTIVE | Noted: 2018-01-01

## 2018-05-26 PROBLEM — R63.0 ANOREXIA: Status: ACTIVE | Noted: 2018-01-01

## 2018-05-26 NOTE — H&P
[facility]  [phone]    Patient Name: Ivet Buchanan  YOB: 1960    Date of Provider Hospice Visit: 05/26/18    Level of Care:   [x] General Inpatient (GIP)    [] Routine   [] Respite    Current Location of Care:  [] [facility] [] [facility] [] [facility] [] [facility] [x] Hospice House [facility]    IF [facility], patient referred from:  [] [facility] [] [facility] [] [facility] [] [facility] [x] Home [] Other:     Date of Original Hospice Admission: 5/26/18  Hospice Medical Director at time of admission: Dr. Ewa Norwood Diagnosis: Esophageal cancer with liver mets and retroperitoneal fibrosis  Diagnoses RELATED to the terminal prognosis: Polyfibrosis syndrome, ureteral obstruction with L stent placement       HOSPICE SUMMARY     Ivet Buchanan is a 60y.o. year old who was admitted to [facility]. Patient has a history of poly fibrosis syndrome including contractures, keloids, retroperitoneal fibrosis) and esophageal cancer with liver mets diagnosed in 2016. He has retroperitoneal and pelvic fibrosis resulting in ureteral obstruction and left stent then placement of bilateral nephrostomy tubes with recent replacement of tube in early May. He also has DVT related to cancer and immobility. He was receiving chemotherapy as recent as April 2018. He has been suffering from increasing abdominal distention, anasarca and intractable pain. Patient declined paracentesis, left nephrostomy tube replaced at hospital yesterday and then patient transferred to [facility] for pain control and high need of care. Functionally, the patient's Karnofsky and/or Palliative Performance Scale has declined over a period of 4 months and is estimated at 50%.  The patient is dependent on all ADLs    Objective information that support this patients limited prognosis includes: intractable pain, rising creatinine in the setting of ureteral obstruction, anasarca in the setting of lymphatic obstruction and nutritional decline    The patient/family chose comfort measures with the support of Hospice. HOSPICE DIAGNOSES   Active Symptoms:  1. Intractable pain  2. Anxiety related to pain  3. Severe debility  4. Poor po intake     PLAN   1. Abdominal pain- Continue Fentanyl 100 mcg every 4 hours scheduled along with 100 mcg every 1 hour as needed for pain   - Continue Oxycontin 160 mg three times a day   - Continue Roxicodone 30 mg every 4 hours as needed. - If prn Fentanyl is unavailable, patient can have Fentanyl 100 mcg sublingual spray x 2 and if this does not bring his pain level down by at least 2 points, then administer 400 mcg fentanyl spray after 1 hour.   - Future choice of Fentanyl spray should be 400 mcg. 2. Pain crisis- in severe pain crisis, administer Ativan 0.5 mg IV. 3. Patient or wife can refuse scheduled IV ativan based on his comfort  4. Spent time with wife separately reviewing events of the last few days and plan of care. 5.  and SW to support family needs  6. Disposition: [facility] given intractable pain and care needs    Prognosis estimated based on 05/26/18 clinical assessment is:   [] Hours to Days    [x] Days to Weeks    [] Other:    Communicated plan of care with: Hospice Case Manager; Hospice IDT; Care Team     GOALS OF CARE     Resuscitation Status: FULL  Durable DNR: [] Yes [x] No    Advance Care Planning 3/23/2018   Patient's Healthcare Decision Maker is: Named in scanned ACP document   Primary Decision Maker Name Fadia Crystal   Primary Decision Maker Phone Number [phone]   Primary Decision Maker Relationship to Patient Spouse   Confirm Advance Directive Yes, on file   Does the patient have other document types Power of         HISTORY     History obtained from: chart, patient and wife    CHIEF COMPLAINT: abdominal pain  The patient is:   [x] Verbal  [] Nonverbal  [] Unresponsive    HPI/SUBJECTIVE:    Patient with above mentioned history.    Abdominal pain- cramping pain present all the time. Some relief with IV medications, better relief with IV ativan per wife. Eating very less, wants pizza today which he will try once pain is better. REVIEW OF SYSTEMS     The following systems were: [x] reviewed  [] unable to be reviewed    Positive ROS include:  Constitutional: fatigue, weakness, in pain, short of breath  Ears/nose/mouth/throat: increased airway secretions  Respiratory:shortness of breath, wheezing  Gastrointestinal:poor appetite, nausea, vomiting, abdominal pain, constipation, diarrhea  Musculoskeletal:pain, deformities, swelling legs  Neurologic:confusion, hallucinations, weakness  Psychiatric:anxiety, feeling depressed, poor sleep  Endocrine:     Adult Non-Verbal Pain Assessment Score: Face  [] 0   No particular expression or smile  [] 1   Occasional grimace, tearing, frowning, wrinkled forehead  [] 2   Frequent grimace, tearing, frowning, wrinkled forehead    Activity (movement)  [] 0   Lying quietly, normal position  [] 1   Seeking attention through movement or slow, cautious movement  [] 2   Restless, excessive activity and/or withdrawal reflexes    Guarding  [] 0   Lying quietly, no positioning of hands over areas of body  [] 1   Splinting areas of the body, tense  [] 2   Rigid, stiff    Physiology (vital signs)  [] 0   Stable vital signs  [] 1   Change in any of the following: SBP > 20mm Hg; HR > 20/minute  [] 2   Change in any of the following: SBP > 30mm Hg; HR > 25/minute    Respiratory  [] 0   Baseline RR/SpO2, compliant with ventilator  [] 1   RR > 10 above baseline, or 5% drop SpO2, mild asynchrony with ventilator  [] 2   RR > 20 above baseline, or 10% drop SpO2, asynchrony with ventilator     FUNCTIONAL ASSESSMENT     Palliative Performance Scale (PPS):     PSYCHOSOCIAL/SPIRITUAL ASSESSMENT     Active Problems:    * No active hospital problems.  *    Past Medical History:   Diagnosis Date    Other ill-defined conditions(741.27)     polyfibromitosis Past Surgical History:   Procedure Laterality Date    HX ORTHOPAEDIC      hand surgery      Social History   Substance Use Topics    Smoking status: Never Smoker    Smokeless tobacco: Never Used    Alcohol use No     No family history on file.    Allergies   Allergen Reactions    Levaquin [Levofloxacin] Nausea and Vomiting    Xanax [Alprazolam] Other (comments)     Possible delirium      Current Facility-Administered Medications   Medication Dose Route Frequency    fentaNYL spry 100 mcg (Patient Supplied)  100 mcg SubLINGual PRN    LORazepam (ATIVAN) injection 0.5 mg  0.5 mg IntraVENous Q6H    dronabinol (MARINOL) capsule 10 mg (Patient Supplied)  10 mg Oral DAILY    fentaNYL citrate (PF) injection 100 mcg  100 mcg IntraVENous Q4H    fentaNYL citrate (PF) injection 100 mcg  100 mcg IntraVENous Q1H PRN    ondansetron (ZOFRAN) injection 4 mg  4 mg IntraVENous Q8H    oxyCODONE ER (OxyCONTIN) tablet 180 mg (Patient Supplied)  180 mg Oral Q8H    ondansetron (ZOFRAN) injection 8 mg  8 mg IntraVENous Q8H PRN    senna-docusate (PERICOLACE) 8.6-50 mg per tablet 2 Tab  2 Tab Oral BID    apixaban (ELIQUIS) tablet 5 mg (Patient Supplied)  5 mg Oral BID    oxyCODONE IR (ROXICODONE) tablet 30 mg  30 mg Oral Q3H PRN    LORazepam (ATIVAN) injection 1 mg  1 mg IntraVENous Q6H PRN        PHYSICAL EXAM     Wt Readings from Last 3 Encounters:   12/13/17 82.8 kg (182 lb 9.6 oz)   11/08/17 82.8 kg (182 lb 9.6 oz)   04/08/11 95.2 kg (209 lb 14.1 oz)       Visit Vitals    /70 (BP 1 Location: Right arm, BP Patient Position: At rest)    Pulse 65    Temp 98.1 °F (36.7 °C)    Resp 20    SpO2 96%       Supplemental O2  [] Yes  [x] NO  Last bowel movement:     Currently this patient has:  [] Peripheral IV [] PICC  [x] PORT [] ICD    [] Menjivar Catheter [] NG Tube   [] PEG Tube    [] Rectal Tube [] Drain  [] Other:  B/l nephrostomy tubes, ileostomy     Constitutional: cachectic, ill appearing  Eyes: na  ENMT: moist, clean  Cardiovascular: reg hs  Respiratory: wnl  Gastrointestinal:ileostomy bag with stool   Musculoskeletal:left leg swelling and increased warmth  Skin:dry  Neurologic:na  Psychiatric: na  Other:       Pertinent Lab and or Imaging Tests:  Lab Results   Component Value Date/Time    Sodium 135 05/18/2018 02:09 PM    Potassium 5.0 05/18/2018 02:09 PM    Chloride 91 (L) 05/18/2018 02:09 PM    CO2 31 (H) 05/18/2018 02:09 PM    Glucose 109 (H) 05/18/2018 02:09 PM    BUN 23 05/18/2018 02:09 PM    Creatinine 1.75 (H) 05/18/2018 02:09 PM    BUN/Creatinine ratio 13 05/18/2018 02:09 PM    GFR est AA 48 (L) 05/18/2018 02:09 PM    GFR est non-AA 42 (L) 05/18/2018 02:09 PM    Calcium 9.0 05/18/2018 02:09 PM     Lab Results   Component Value Date/Time    Protein, total 5.9 (L) 05/18/2018 02:09 PM    Albumin 3.2 (L) 05/18/2018 02:09 PM           Total time: 100m  Counseling / coordination time:   > 50% counseling / coordination?:

## 2018-05-26 NOTE — PROGRESS NOTES
Problem: Pain  Goal: *Control of Pain  Outcome: Progressing Towards Goal  Assessment of pain level and management at the beginning of shift and throughout. Patient is receiving scheduled and prn medications for uncontrolled pain. Use of fentanyl with lorazepam has been effective. Will continue to monitor.

## 2018-05-26 NOTE — PROGRESS NOTES
Discussed with Yecenia Hawkins after chart review    Here is CTI narrative    Mr. William Multani is a 60year old with a history of poly fibrosis syndrome including contractures, keloids, retroperitoneal fibrosis) and esophageal cancer with liver mets diagnosed in 2016. He has retroperitoneal and pelvic fibrosis resulting in ureteral obstruction and left stent then placement of bilateral nephrostomy tubes with recent replacement of tube in early May. He also has DVT related to cancer and immobility. He was receiving chemotherapy as recent as April 2018. He has been declining in function for 4 months and has been losing weight but gaining swelling. He now has ascites, new onset that could not be drained on recent hospitalization at [facility]. Baseline creat is 1.8 but is fluctuating as high as 2.5-3. Fluid management is difficult; cannot tolerate diuretics due to renal function but is gaining edema due to clots, lymphatic obstruction and nutritional decline. He has chosen comfort measures with support of hospice. Patient still not controlled  Limitations in [facility] availability of medications    Recommendations  1. Increase Fentanyl from 50 to 100mcg IV and schedule every 4 hours  2. Increase Fentanyl prn from 50 to 100mcg IV every 1 hour prn  3. Schedule Zofran 4mg IV every 8 (lower dose from 8mg but scheduled)  4. Schedule Ativan 0.5mg IV every 8 with availability of 1mg PRN  5. D/C morphine (note patient has baseline creat of 2+ and high risk for morphine toxicity at high doses  6. Continue Oxycontin 180mg every 8 hours PO as long as can tolerate some PO  7. Non-formulary trial of Marinol 10mg daily    Need to work closely with pharmacy for [facility] availability of medications; ideally he would be on a Fentanyl PCA that would then be transferrable to the home environment.     Dr. Bell Cook to see, will connect

## 2018-05-26 NOTE — CERTIFICATE OF TERMINAL ILLNESS
Hospice Physician Admission Narrative     Principle Hospice Diagnosis: esophageal cancer  Diagnoses RELATED to the terminal prognosis: severe pain, nephrostomy tubes, ileostomy, edema, renal failure, retroperitoneal fibrosis  Other Diagnoses: polyfibrosis syndrome (related)     HOSPICE NARRATIVE COMPOSED BY PHYSICIAN     Mr. Adelia Dawson is a 60year old with a history of poly fibrosis syndrome including contractures, keloids, retroperitoneal fibrosis) and esophageal cancer with liver mets diagnosed in 2016. He has retroperitoneal and pelvic fibrosis resulting in ureteral obstruction and left stent then placement of bilateral nephrostomy tubes with recent replacement of tube in early May. He also has DVT related to cancer and immobility. He was receiving chemotherapy as recent as April 2018. He has been declining in function for 4 months and has been losing weight but gaining swelling. He now has ascites, new onset that could not be drained on recent hospitalization at [facility]. Baseline creat is 1.8 but is fluctuating as high as 2.5-3. Fluid management is difficult; cannot tolerate diuretics due to renal function but is gaining edema due to clots, lymphatic obstruction and nutritional decline.  He has chosen comfort measures with support of hospice.    ______________________________________________________________________

## 2018-05-26 NOTE — PROGRESS NOTES
0700:  Verbal shift change report given to Ronen Calzada RN (oncoming nurse) by Pantera Ramsay RN (offgoing nurse). Report included the following information SBAR, Kardex, Intake/Output and MAR.   0800:  Rounded; pt sleeping; family present; no s/s of distress. 0900:  Rounded; pt resting comfortably; family present; no s/s of distress. 1020:  Administered scheduled medications and assessed pt (see Simple Assessment); pt complaining of abdominal pain 7/10. Administered prn fentanyl 100mcg/spray/SL and oxycodone IR 30mg/tablet. 1115:  Reassessed pain; pt stated that it was \"better\" and did not want anymore pain medication. Administered scheduled zofran 4mg/IV. Will continue to monitor pain & nausea. 1220:  Administered scheduled medications. Turned/respositioned pt onto his right side. Asked pt if he was comfortable; he just closed his eyes and rested; family present. 1310:  Rounded; pt sleeping; no s/s of distress  1400:  Administered scheduled oxycodone; pt states pain level is 6.5/10 but does not want to take anything. 1440:  Administered scheduled zofran 4mg/IV; asked pt if he is having any pain; he states that it's 7/10. Asked if he wanted any pain medication and he stated yes. Administered prn fentanyl spray 100mcg/spray/SL. 1530:  Reassessed pain; pt sleeping; no s/s of pain; family present. 1630:  Administered scheduled fentanyl 100mcg/IV. Pt grimacing; asked him how is his pain level is; he stated 7/10. Asked if he wanted any medication and he stated yes. Administered prn fentanyl 100mcg/IV. Will continue to monitor. 1740:  Assessed pain and administered scheduled medications; pt stated that his pain is better. Will continue to monitor. 1800:  Administered scheduled medications; pt states that his pain is 6/10 but does not want any prn medications.        NAME OF PATIENT:  Tim Diane    LEVEL OF CARE:  GIP    REASON FOR GIP:   Pain, despite numerous changes in medications, Nausea and vomiting, despite changes to medications, Medication adjustment that must be monitored 24/7 and Stabilizing treatment that cannot take place at home    * PATIENT REMAINS ELIGIBLE FOR GIP LEVEL OF CARE AS EVIDENCED BY: (MUST BE ADDRESSED OF PATIENT GIP) Abdominal pain- Continue Fentanyl 100 mcg every 4 hours scheduled along with 100 mcg every 1 hour as needed for pain:            - Continue Oxycontin 180 mg three times a day             - Continue Roxicodone 30 mg every 4 hours as needed. - If prn Fentanyl is unavailable, patient can have Fentanyl 100 mcg sublingual spray x 2 and if this does not bring             his pain level down by at least 2 points, then administer 400 mcg fentanyl spray after 1 hour.            - Future choice of Fentanyl spray should be 400 mcg. REASON FOR RESPITE:  n/a    O2 SAFETY:  n/a    FALL INTERVENTIONS PROVIDED:   Implemented/recommended use of non-skid footwear, Implemented/recommended use of fall risk identification flag to all team members, Implemented/recommended resources for alarm system (personal alarm, bed alarm, call bell, etc.) , Implemented/recommended environmental changes (remove hazards, lower bed, improve lighting, etc.) and Implemented/recommended increased supervision/assistance    INTERDISPLINARY COMMUNICATION/COLLABORATION:  Physician, MSW, [place] and RN, CNA    NEW MEDICATION INITIATION DOCUMENTATION:  n/a    Reason medication is being initiated:  n/a    MD / Provider name consulted re: change in status / initiation of new medication:  n/a    New Symptom(s):  n/a    New Order(s):  n/a    Name of the person notified of the changes:  n/a    Name of person being taught:  n/a    Instructions given:  n/a    Side Effects taught:  n/a    Response to teaching:  n/a      COMFORTABLE DYING MEASURE:  Is Patient/family satisfied with symptom level?  yes    DISCHARGE PLAN:  MSW working with family on discharge plans (LTC or home).

## 2018-05-26 NOTE — PROGRESS NOTES
1900 Report received from [facility]. Pt is [facility] level of care. Dx Gastroesophageal Cancer with mets. 1920 Met patient and his wife Giovanni Bowers. Pt is in bed in constant motion of his head and rocking back and forth. Rates his abd pain as 7 of 10. He is very pleasant and cooperative. Pts wife has brought meds from home. Phone call to Dr Lou Cain to discuss pain medication availability for immediate administration. 1955 Fentanyl spray(pt Supply) ordered and 100mcg spray given x 1.   2035 Pt voices no relif from spray. Rates pain still at 7 or 8 of 10. Fentanyl 50mcg given IV via right port. 2101 Pt voices still no pain relief. Morphine 5mg given IV.  2156 Pt states pain remains a 7 of 10. He is rubbing his abdomen and rocking in bed. Fentanyl 50 mcg given IV for pain. Pt states he is hungry but feels bloating when he eats. Having frequent burping. Taking few sips of broth. 2230 Phone call to Dr Lou Cain to inform of pts pain status. Orders obtained to increase frequency of Fentanyl IV to q1 hr prn, and continue prn Fentanyl spray. 2300Oxycontin not available from Pharmacy. Oxycodone 30 mg (Pt supply) ordered and given. Fentanyl 100mcg spray and Lorazepam 1mg given IV.  2330 Pt appears to be resting . He rates his pain as 5 of 10. Fentanyl  50mcg given IV to manage pain. Wife is resting on sofa. 0000 Appears to be sleeping. No facial grimace. 0100  Assisted to bathroom. Pt states he sometimes feels he has to urinate. He appears drowsy, gait is steady. Bilater nephrostomy bags; left with little urine output with flecks of blood. This one was replaced 5/25. Wife states his left kidney function is poor. Right nephrostomy with nate, cloudy urine. Wife flushes each daily she states with 10 ml saline for patency. Right LQ Ileostomy with green liquid output. Pt places ice pack to abdomen for pain relief as well  0200 Appears to be sleeping, no facial grimace. 0300 Fentanyl 50 mcg given IV.  Scheduled medication required to manage pain. Pt rates  As a 4 of 10.  0400 Pt appears to be sleeping. 0530 Up to bathroom with assistance. Emptied Nephrostomy bags Scant in left, 150 in right.  0600 C/o abd pain 8 of 10. Medicated with Fentanyl IV. Had sips of water, began to vomit, approx 200ml bile colored. Ativan 1 mg given IV.  0640 Appears to be asleep, vomiting relieved. NAME OF PATIENT:  Jose Arana    LEVEL OF CARE:  GIP    REASON FOR GIP:   Pain, despite numerous changes in medications, Medication adjustment that must be monitored 24/7 and Stabilizing treatment that cannot take place at home    *PATIENT REMAINS ELIGIBLE FOR GIP LEVEL OF CARE AS EVIDENCED BY: (MUST BE ADDRESSED OF PATIENT GIP) Frequent assessment of symptoms and medication changes and adjustments required to manage pain. REASON FOR RESPITE:  na    O2 SAFETY:  na    FALL INTERVENTIONS PROVIDED:   Implemented/recommended use of fall risk identification flag to all team members, Implemented/recommended resources for alarm system (personal alarm, bed alarm, call bell, etc.) , Implemented/recommended environmental changes (remove hazards, lower bed, improve lighting, etc.) and Implemented/recommended increased supervision/assistance    INTERDISPLINARY COMMUNICATION/COLLABORATION:  Physician, MSW, [place] and RN, CNA    NEW MEDICATION INITIATION DOCUMENTATION:  Obtained Order from Provider for initiation of symptom relief medication /other medication needed and Documentation completed in Clinical Note in [facility] Care    Reason medication is being initiated:  Pain management,relief    MD / Provider name consulted re: change in status / initiation of new medication:  Dr Reza Snell Symptom(s):  Increased and uncontrolled pain with medications ordered    New Order(s):  New admission.  Admission orders with additional new and changed medications and increased frequency to manage pain  Fentanyl Spray added to new medications    Name of the person notified of the changes:  Pt and his wife Eather Dakin    Name of person being taught:  Pt and wife    Instructions given:  Purpose, route and frequency of medications    Side Effects taught:  drowsiness    Response to teaching:  Voices understanding      COMFORTABLE DYING MEASURE:  Is Patient/family satisfied with symptom level? Yes    DISCHARGE PLAN:  Return to home when symptoms and care can be managed at home.

## 2018-05-27 NOTE — PROGRESS NOTES
Problem: Nausea/Vomiting (Adult)  Goal: *Absence of nausea/vomiting  Outcome: Progressing Towards Goal  Upon admission, pt had n/v. Assessed pt this morning for n/v; patient has had no vomiting for the last 24 hrs but still has occasional nausea. Pt receiving scheduled zofran and can receive prn zofran for breakthrough nausea. Will continue to monitor.

## 2018-05-27 NOTE — PROGRESS NOTES
1900: Shift report received from [address]: Pt in bed, drowsy, resting with eyes closed. Lungs diminished, bowel sounds hypoactive, abdomen firm, but does not appear to be hurtful since pt does not react to touch. Ileotomy bag very little filled. Legs bilaterally edematous +3-4.  2020: Pt medicated with scheduled pain med. Does not open eyes. 2100: Pt starts to become agitated, moving upper extremities non-purposeful, grimacing, when asked if in pain he says yes, rates pain at 6/10. Medicated with PRN Ativan and scheduled Methadone oral solution. Tolerated well. 2130: Pt resting, less agitated. nephrostomy tubes flushed with NS, both sides flush well. Talked to wife and gave emotional support. She expressed that everything append very fast, when they thought he would get better after having his nephrostomy tube out back into place at the hospital. But instead he was admitted to hospice care and she has had no time to deal with this situation. Feelings validated and listen to her stories. 2310: Pt in bed, moving head and arms around. When asked if he was ok he says yes, denies need for pain medicine at this time. 0003: Scheduled meds administered. Pt resting with shallow respirations. 0115: Pt sleeping. 0210: Pt moves head while resting with eyes closed, some grimacing noted. Will check back shortly to see if agitation continues. 0340: Medicated with scheduled Fentanyl and PRN Ativan for s/s of agitation, moving arms and head and grimacing. 0440: pt complains of pain on right side, he is grimacing and moaning and very drowsy. Pt repositioned and nephrostomy tube checked. PRN Dilaudid administered. 0600: Scheduled meds administered. Tablets held due to pt being drowsy and not alert enough to taken tablets.     NAME OF PATIENT:  Yessy Lipoma    LEVEL OF CARE:  GIP    REASON FOR GIP:   Pain, despite numerous changes in medications, Medication adjustment that must be monitored 24/7 and Stabilizing treatment that cannot take place at home    *PATIENT REMAINS ELIGIBLE FOR GIP LEVEL OF CARE AS EVIDENCED BY: (MUST BE ADDRESSED OF PATIENT GIP)  Pt has severe pain rated at 6-7/10 and is grimacing and moaning on slight movement, episodes of agitation ans restlessness.     O2 SAFETY:  n/a    FALL INTERVENTIONS PROVIDED:   Implemented/recommended assistive devices and encouraged their use, Implemented/recommended resources for alarm system (personal alarm, bed alarm, call bell, etc.)  and Implemented/recommended environmental changes (remove hazards, lower bed, improve lighting, etc.)    INTERDISPLINARY COMMUNICATION/COLLABORATION:  Physician, MSW, Zari and RN, CNA    NEW MEDICATION INITIATION DOCUMENTATION:  no changes made on night shift    COMFORTABLE DYING MEASURE:  Is Patient/family satisfied with symptom level?  yes    DISCHARGE PLAN:  Pending

## 2018-05-27 NOTE — PROGRESS NOTES
[facility]  [phone]    Patient Name: Yg Seo  YOB: 1960    Date of Provider Hospice Visit: 05/27/18    Level of Care:   [x] General Inpatient (GIP)    [] Routine   [] Respite    Current Location of Care:  [] [facility] [] [facility] [] [facility] [] [facility] [x] Hospice House [facility]    IF [facility], patient referred from:  [] [facility] [] [facility] [] [facility] [] [facility] [x] Home [] Other:     Date of Original Hospice Admission: 5/26/18  Hospice Medical Director at time of admission: Dr. Kelly Quintana Diagnosis: Esophageal cancer with liver mets and retroperitoneal fibrosis  Diagnoses RELATED to the terminal prognosis: Polyfibrosis syndrome, ureteral obstruction with L stent placement       HOSPICE SUMMARY     Yg Seo is a 60y.o. year old who was admitted to [address] [place] [address]. Patient has a history of poly fibrosis syndrome including contractures, keloids, retroperitoneal fibrosis) and esophageal cancer with liver mets diagnosed in 2016. He has retroperitoneal and pelvic fibrosis resulting in ureteral obstruction and left stent then placement of bilateral nephrostomy tubes with recent replacement of tube in early May. He also has DVT related to cancer and immobility. He was receiving chemotherapy as recent as April 2018. He has been suffering from increasing abdominal distention, anasarca and intractable pain. Patient declined paracentesis, left nephrostomy tube replaced at hospital yesterday and then patient transferred to [facility] for pain control and high need of care. Functionally, the patient's Karnofsky and/or Palliative Performance Scale has declined over a period of 4 months and is estimated at 50%.  The patient is dependent on all ADLs    Objective information that support this patients limited prognosis includes: intractable pain, rising creatinine in the setting of ureteral obstruction, anasarca in the setting of lymphatic obstruction and nutritional decline    The patient/family chose comfort measures with the support of Hospice. HOSPICE DIAGNOSES   Active Symptoms:  1. Intractable pain, abdominal    2. Anxiety related to pain  3. Severe debility  4. Poor po intake     PLAN   1. Abdominal pain- Associated w/ po intake. Severe. Confirmed w/ [facility] that they cannot deliver PCA here. Confirmed w/ [facility] that they cannot supply PCA here or at home given national shortage. 1.  Continue Fentanyl 100 mcg every 4 hours scheduled along with 100 mcg every 1 hour as needed for pain  2. Continue Oxycontin 160 mg three times a day  3. Continue Roxicodone 30 mg every 4 hours as needed and Fentanyl spray. 4. Given shortage of IV opioids, adding back up IV Dilaudid if Fentanyl out of supply. 5. In past has had been tried on mult interventions for pain incl IV Ketamine, Fentanyl patch (no longer absorbing), Methadone po (pt felt it made him feel funny and did not want to take). 6. Given pt's complex sx management, acute pain spikes, and limitation in our ability to get PCA discuss Methadone again w/ pt and wife. 7. Add Methadone liquid 5mg po tid today. This is part to add to pain control, in part to see how it makes him feel. If tolerates, this might be good option- could titrate up Methadone and in future may be able to decr Oxycontin. 8. We also talk about the fact it may be harder for him to take pills in future and Oxycontin cannot be crushed. 2. Anxiety: Cont scheduled and prn ativan. 3. Goals: Wife and pt's goals are still to try and get home and even get chemo again if possible (LCSW today approached conversation about code status again). However wife also able to tell me that she knows pt is much worse than he was a week ago and that they are even willing to have the side effect of sedation if that is needed for pain control. 5/10 pain is tolerable for him.   4. Disposition: [facility] given intractable pain and care needs    Prognosis estimated based on 05/27/18 clinical assessment is:   [] Hours to Days    [x] Days to Weeks    [] Other:    Communicated plan of care with: Hospice Case Manager; Hospice IDT; Care Team; Dr Gino Ojeda     Resuscitation Status: FULL  Durable DNR: [] Yes [x] No    Advance Care Planning 3/23/2018   Patient's Healthcare Decision Maker is: Named in scanned ACP document   Primary Decision Maker Name Pebbles Vasquez   Primary Decision Maker Phone Number [phone]   Primary Decision Maker Relationship to Patient Spouse   Confirm Advance Directive Yes, on file   Does the patient have other document types Power of         HISTORY     History obtained from: chart, patient and wife    CHIEF COMPLAINT: abdominal pain  The patient is:   [x] Verbal  [] Nonverbal  [] Unresponsive    HPI/SUBJECTIVE:    Patient with above mentioned history. Pt very weak. He lets his wife do most of the talking, but does confirm w/ me that he is okay w/ the current plan. Cont to have severe, sharp abdominal pain- worse every time he eats. Pain now a 3-4/10 but earlier today got up to a 7/10.             REVIEW OF SYSTEMS     The following systems were: [x] reviewed  [] unable to be reviewed    Positive ROS include:  Constitutional: fatigue, weakness, in pain, short of breath  Ears/nose/mouth/throat: increased airway secretions  Respiratory:shortness of breath, wheezing  Gastrointestinal:poor appetite, nausea, vomiting, abdominal pain, constipation, diarrhea  Musculoskeletal:pain, deformities, swelling legs  Neurologic:confusion, hallucinations, weakness  Psychiatric:anxiety, feeling depressed, poor sleep  Endocrine:     Adult Non-Verbal Pain Assessment Score: 4 right now    Face  [] 0   No particular expression or smile  [] 1   Occasional grimace, tearing, frowning, wrinkled forehead  [] 2   Frequent grimace, tearing, frowning, wrinkled forehead    Activity (movement)  [] 0   Lying quietly, normal position  [] 1   Seeking attention through movement or slow, cautious movement  [] 2   Restless, excessive activity and/or withdrawal reflexes    Guarding  [] 0   Lying quietly, no positioning of hands over areas of body  [] 1   Splinting areas of the body, tense  [] 2   Rigid, stiff    Physiology (vital signs)  [] 0   Stable vital signs  [] 1   Change in any of the following: SBP > 20mm Hg; HR > 20/minute  [] 2   Change in any of the following: SBP > 30mm Hg; HR > 25/minute    Respiratory  [] 0   Baseline RR/SpO2, compliant with ventilator  [] 1   RR > 10 above baseline, or 5% drop SpO2, mild asynchrony with ventilator  [] 2   RR > 20 above baseline, or 10% drop SpO2, asynchrony with ventilator     FUNCTIONAL ASSESSMENT     Palliative Performance Scale (PPS): 30     PSYCHOSOCIAL/SPIRITUAL ASSESSMENT     Active Problems:    Intractable abdominal pain (5/26/2018)      Anxiety about health (5/26/2018)      Anorexia (5/26/2018)      Esophageal cancer, stage IV ([facility] [address] (5/26/2018)      Retroperitoneal fibrosis (5/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    Allergies   Allergen Reactions    Levaquin [Levofloxacin] Nausea and Vomiting    Xanax [Alprazolam] Other (comments)     Possible delirium      Current Facility-Administered Medications   Medication Dose Route Frequency    HYDROmorphone (DILAUDID) injection 2 mg  2 mg IntraVENous Q4H PRN    methadone (DOLOPHINE) 10 mg/mL concentrated solution 5 mg  5 mg Oral Q8H    fentaNYL spry 100 mcg (Patient Supplied)  100 mcg SubLINGual PRN    LORazepam (ATIVAN) injection 0.5 mg  0.5 mg IntraVENous Q6H    fentaNYL citrate (PF) injection 100 mcg  100 mcg IntraVENous Q4H    fentaNYL citrate (PF) injection 100 mcg  100 mcg IntraVENous Q1H PRN    ondansetron (ZOFRAN) injection 8 mg  8 mg IntraVENous Q4H PRN    ondansetron (ZOFRAN) injection 4 mg  4 mg IntraVENous Q6H    oxyCODONE ER (OxyCONTIN) tablet 180 mg (Patient Supplied)  180 mg Oral Q8H    senna-docusate (PERICOLACE) 8.6-50 mg per tablet 2 Tab  2 Tab Oral BID    apixaban (ELIQUIS) tablet 5 mg (Patient Supplied)  5 mg Oral BID    oxyCODONE IR (ROXICODONE) tablet 30 mg  30 mg Oral Q3H PRN    LORazepam (ATIVAN) injection 1 mg  1 mg IntraVENous Q6H PRN        PHYSICAL EXAM     Wt Readings from Last 3 Encounters:   12/13/17 82.8 kg (182 lb 9.6 oz)   11/08/17 82.8 kg (182 lb 9.6 oz)   04/08/11 95.2 kg (209 lb 14.1 oz)       Visit Vitals    /66 (BP 1 Location: Right arm, BP Patient Position: At rest)    Pulse 88    Temp 97.7 °F (36.5 °C)    Resp 10    SpO2 98%       Supplemental O2  [] Yes  [x] NO  Last bowel movement:     Currently this patient has:  [] Peripheral IV [] PICC  [x] PORT [] ICD    [] Menjivar Catheter [] NG Tube   [] PEG Tube    [] Rectal Tube [] Drain  [] Other:  B/l nephrostomy tubes, ileostomy     Constitutional: cachectic, ill appearing  Eyes: clear  ENMT: moist, clean  Cardiovascular: reg heart rate  Respiratory: wnl  Gastrointestinal:ileostomy bag with very little stool   Musculoskeletal: no deformities   Skin:dry  Neurologic:fatigue , awake, alert   Psychiatric:intact     Pertinent Lab and or Imaging Tests:  Lab Results   Component Value Date/Time    Sodium 135 05/18/2018 02:09 PM    Potassium 5.0 05/18/2018 02:09 PM    Chloride 91 (L) 05/18/2018 02:09 PM    CO2 31 (H) 05/18/2018 02:09 PM    Glucose 109 (H) 05/18/2018 02:09 PM    BUN 23 05/18/2018 02:09 PM    Creatinine 1.75 (H) 05/18/2018 02:09 PM    BUN/Creatinine ratio 13 05/18/2018 02:09 PM    GFR est AA 48 (L) 05/18/2018 02:09 PM    GFR est non-AA 42 (L) 05/18/2018 02:09 PM    Calcium 9.0 05/18/2018 02:09 PM     Lab Results   Component Value Date/Time    Protein, total 5.9 (L) 05/18/2018 02:09 PM    Albumin 3.2 (L) 05/18/2018 02:09 PM           Total time: 35 min   Counseling / coordination time: 25 min   > 50% counseling / coordination?: yes

## 2018-05-27 NOTE — HOSPICE
[facility] Help to Those in Need  [phone]    Social Work Admission Note  Patient Name: Nidhi Parr  YOB: 1960  Age: 60 y.o. Date of Visit: 05/27/18  Facility of Care: [facility]  Patient Room: 03/01     Hospice Attending: Alesia Conte MD  Hospice Diagnosis: Metastatic GE junction carcinoma    Level of Care:    [x]  GIP    []  Respite   []  Routine    NARRATIVE   Initial SW Assessment completed on todays date. Pt is a 60year old male with a hospice diagnosis of esophageal cancer. Pt was in bed, awake but lethargic; reporting pain 6 out of 10. [facility] RN also present offered prn medication but pt declined. Asha Clark, pts wife of 35 years present and provided most of the information for todays assessment as pt reported not feeling up to answering many questions. Pt and Asha Clark have two daughters. Edinson Daigle (34) lives locally and [facility] (21) is a student at [employer]. Pt is an employee of [employer] and worked as the [employer] at [employer] until his illness forced him to take a leave of absence. Pt is currently receiving long term disability through his employer as well as long term disability benefits via a private insurance in addition to [employer] benefits. Financial resources are reported to be adequate for now. Pts spouse is employed but is using her vacation hours to be available to spend time with pt. Asha Amber and Edinson Daigle may need assistance with [place] paperwork in the near future. Pt and spouse report that pt has been battling both his esophageal cancer and a related condition (polyfibrosis syndrome) since early 2016. Pt was in palliative services but ended up at [facility] after his nephrostomy tube came completely out and pt was having uncontrolled pain.    Pt and spouse were very candid on todays date in expressing their goal of pain management/control and patient regaining some strength so that he can be a candidate for a third round of chemotherapy. Both are adamant that this is pts goal but spouse realizes that this may not be realistic if pt continues to decline. At present; pt is a Full Code. Code status was discussed and pt along with spouse affirm desire for Full Code status at this time. Pt has an Advance Medical Directive with his wife as primary health care agent and daughter, Smith Jack as secondary health care agent. No pre-planning for final arrangements has been completed although pt does identify preference for crenation. MSW provided pt/caregiver with a resources list for cremation options. Hospice Medicate benefit, hospice philosophy and services, levels of care, ancillary services and role of the SW reviewed. MSW provided pt/caregiver with bereavement brochure. SW to continue to follow. ADVANCE CARE PLANNING    Code Status: FULL CODE  Reviewed on today's date with pt and spouse. Durable DNR: Stephany Keep No  Advance Care Planning 3/23/2018   Patient's Healthcare Decision Maker is: Named in scanned ACP document   Primary Decision Maker Name Joaquín Conway   Primary Decision Maker Phone Number [phone]   Primary Decision Maker Relationship to Patient Spouse   Confirm Advance Directive Yes, on file   Does the patient have other document types Power of        Relationship Status:  []  Single     [x]        []      []  Domestic Partner     []  /  []  Common Law  []    []  Unknown    If in a relationship, name of partner/spouse: Joaquín Conway  Duration of relationship: 35 years    Jainism: Ivonne Solomon (attends [employer] on [address])    Janey 141: No pre-planning for final arrangements has been completed although pt does identify preference for crenation. MSW provided pt/caregiver with a resources list for cremation options. Resources Provided: MSW provided pt/caregiver with a resources list for cremation options.      Social Work Initial Assessment     Gender:  Male    Race/Ethnicity: (yasmany all that apply)  []  American [place] or [place] Native  []    []  Black or [place] American  []   or   []  Native [address] or [place]  [x]  [employer]  []  Unknown      Service:    []  Yes   []  No       []  Unknown  Appropriate for Pinning Ceremony:   []  Yes      [x]  No  Is patient using VA benefits? []  Yes      []  No     Primary Language: English  []   Needed  []   utilized during visit    Ability to express thoughts/needs/feelings  [x]  Expressed thoughts/feelings/needs without difficulty  []  Requires extra time and cuing  []  Speech limited single words  []  Uses only gestures (eye, blinking eye or head movement/pointing)  []  Unable to express thoughts/feelings/needs (speech unintelligible or inappropriate)  []  Unresponsive  Notes:      Mental Status:  [x]  Alert-oriented to:     [x]  Person     [x]  Place     [x]  Time  []  Comatose-responds to:    []   Verbal stimuli    []  Tactile stimuli    []  Painful stimuli  []  Forgetful  []  Disoriented/Confused  [x]  Lethargic  []  Agitated  []  Other (specify):    Notes:      Patients description of Illness/Current Health Status:    []  Patient unable to discuss  []  Patient unwilling to discuss  [x]  (Specify)    Pt and spouse were very candid on todays date in expressing their goal of pain management/control and patient regaining some strength so that he can be a candidate for a third round of chemotherapy. Both are adamant that this is pts goal but spouse realizes that this may not be realistic if pt continues to decline.    Knowledge/Understanding of Disease Process  Patient:    [x]  Demonstrates knowledge/understanding of disease process   []  Demonstrates knowledge/understanding of treatment plan   []  Demonstrates knowledge/understanding of prognosis   []  Demonstrates acceptance of prognosis   [x]  Demonstrates knowledge/understanding of resuscitation status   [x]  Other (specify)Pt and spouse were very candid on todays date in expressing their goal of pain management/control and patient regaining some strength so that he can be a candidate for a third round of chemotherapy. Both are adamant that this is pts goal but spouse realizes that this may not be realistic if pt continues to decline. Caregiver:   []  Demonstrates knowledge/understanding of disease process   []  Demonstrates knowledge/understanding of treatment plan   []  Demonstrates knowledge/understanding of prognosis   []  Demonstrates acceptance of prognosis   [x]  Demonstrates knowledge/understanding of resuscitation status   [x]  Other (specify)  Notes: Pt and spouse were very candid on todays date in expressing their goal of pain management/control and patient regaining some strength so that he can be a candidate for a third round of chemotherapy. Both are adamant that this is pts goal but spouse realizes that this may not be realistic if pt continues to decline.    Patients living arrangement/care setting:  Use the [address] when the PATIENTS current health status necessitated a change in his/her primary residence.     Prior Current Response              [x]             []    Patients own home/residence              []             []    Home of family member/friend              []             []    Boarding home              []             []    Assisted living facility/[facility] center              []             []    Hospital/Acute care facility              []             []    Skilled nursing facility              []             []    Long term care facility/Nursing home              []             [x]    Hospice in Patient      Primary Caregiver:  []  No Primary Caregiver  Name of Primary Caregiver: Nadiya Mendoza  Relationship or Primary Caregiver:Spouse   [x]  Spouse/Significant other       []  Natural Child        []  Step child       []  Sibling   []  Parent   [] Friend/Neighbor   []  Community/Catholic Volunteer   []  Paid help   []  Other (specify):___________  Notes:   35 years. Have 2 daughters; Thi Glynn (34) and Jamila Musa (21).    Family members/Significant others:  Name:   Relationship:   Phone Number:  Actively involved in care? []  Yes  []  No    Name:  Relationship:  Phone Number:  Actively involved in care? []  Yes  []  No    Name:  Relationship:  Phone Number:  Actively involved in care?   []  Yes  []  No    Social support systems: (select ONE best description)  []  Excellent social support system which includes three or more family members or friends  [x]  Good social support system which includes two or less members or friends  []  [address] support which includes one family member or friend  []  Poor social support; no family members or friends; basically ALONE  Notes:      Emotional Status: (yasmany all that apply)    Patient Caregiver Response                 []                [x]    Mood/Affect stable and appropriate                   []                []    Angry                 [x]                [x]    Anxious                 []                []    Apprehensive                 []                []    Avoidant                 []                []    Clinging                 []                []    Depressed                 []                []    Distraught                 []                []    Elated                 []                []    Euphoric                 []                []    Fearful                 []                []    Flat Affect                 []                []    Helpless                 []                []    Hostile                 []                []    Impulsive                 []                []    Irritable                 []                []    Labile                 []                []    Manic                 []                []    Restlessness                 []                []    Sad                 [] []    Suspicious                 []                []    Tearful                 []                []    Withdrawn     Notes:     Coping Skills (strengths/weakness):    Patient: Coping Skills (strength/weakness):    Family/caregiver (strength/weakness):     [place] of care (yasmany all that apply):     []  No burden evident   []  Family must administer medications   []  Illness causing financial strain   []  Family/Support feels overwhelmed   []  Family/Support sleep disturbed with patients care   []  Patients care causes extra physical stress  of death  []  Illness causes changes in family lifestyle  []  Illness impacting family/support employment  []  Family experiencing increased time demands  []  Patients behavior endangers family  []  Denial of patients illness  []  Concern over outcome of illness/fear  []  Patients behavior embarrassing to family   Notes: No burden reported by pt/spouse at this time. MSW concerned that pt's goal of getting stronger to pursue chemotherapy is not realistic.  Pt and spouse may need additional time and support to come to terms with pt's decline and prognosis.    Risk Factors: (yasmany all that apply):    [x]  No burden evident   []  Alcohol abuse  []  Financial resources inadequate to meet basic needs (food/house/etc)  []  Financial resources inadequate to meet health care needs (supplies/equipment/medications)  []  Food/nutrition resources inadequate  []  Home environment unsafe/inadequate for home care  []  Homicidal risk  []  Lives alone or without concerned relatives  []  Multiple medications/complex schedule  []  Physical limitations increase likelihood of falls  []  Plan of care/treatments complicated  []  Substance use/abuse  []  Suicidal risk  []  Visual impairment threatens safety/ability to perform self-care  []  Other (specify):     Abuse/Neglect (actual/potential risks):  [x]  No signs of abuse/neglect  []  History of abuse/neglect                 [] Physical          []  Sexual  []  History of domestic violence  []  Lacks adequate physical care  []  Lacks emotional nurturing/support  []  Lacks appropriate stimulation/cognitive experiences  []  Left alone inappropriately  []  Lacks necessary supervision  []  Inadequate or delayed medical care  []  Unsafe environment (i.e guns/drug use/history of violence in the home/etc.)  []  Bruising or other physical signs of injury present  []  Other (specify):  Notes:   []  Refer to child/adult protective services      Current Sources of Stress (in Addition to Current Illness):   [x]  None reported  []  Bills/Debt    []  Career/Job change    []   (short term)    []   (long term)    []  Death of a child (recent)    []  Death of a parent (recent)   []  Death of a spouse (recent)   []  Employment status changed   []  Family discord    []  Financial loss/Inadequate inther (specify):come  []  Job loss  []  Legal issues unresolved  []  Lifestyle change  []  Marital discord  []  Marriage within the last year  []  Paperwork (insurance/legal/etc) overwhelming  []  Separation/Divorce  []  Other (specify):  Notes:      Current Community Resources Being Utilized     MSW provided pt/caregiver with a resources list for cremation options. Information on bereavement services provided. Interventions/Plan of Care     1. Assess social and emotional factors related to coping with end of life issues  2. Community resource planning/referral   3. Relocation to different care setting if/when symptoms stabilize  4. Discharge Planning     1. Pt remains at GIP level of care. Spouse reports plan and ability to care for pt in their home setting if pt stabilizes enough for discharge.      MSW Assessment Completed by: Jalen Garza  05/27/18    Time In:8:30      Time Out:9:25

## 2018-05-27 NOTE — PROGRESS NOTES
0700:  Verbal shift change report given to Danielle Joiner RN (oncoming nurse) by Antonietta Domínguez RN (offgoing nurse). Report included the following information SBAR, Kardex, Intake/Output and MAR.   0730:  Rounded; pt & wife asleep; no s/s of pain nor distress. 0845:  Administered scheduled medications and assessed pt (see Simple Assessment). Kevin Barnett MSW present speaking with wife. Asked pt what his pain level is and he stated between 6-7/10. Asked if he wanted any prn medication and he stated no (he just received scheduled pain medications). Pt stated that he had a better night than last night; he appears to be in a better mood today than yesterday. 0930:  Rounded; pt resting; wife present; no s/s of pain nor distress. 1000:  Rounded; pt eating breakfast/grimacing. Asked if he was in pain and he stated yes 6.5/10. Asked if he would like prn pain medication and he stated yes. Administered prn fentanyl 100 mcg/IV. Will reassess within 1 hour. 1015:  Patient rang bell; entered room, pt on right side/in fetal position/grimacing. Wife stated that he was in a lot of pain (just finished eating breakfast). Recently gave pain medication but offered prn zofran & lorazepam to help; pt agreed to take. Administered prn zofran 8mg/IV and lorazepam 0.5mg/IV. Will continue to monitor. 1130:  Rounded; reassessed pain. Pt resting comfortably (eyes closed/resting). Asked him how he was feeling and he stated better. 1200:  Administered scheduled medications (fentanyl, zofran, lorazepam). Kitchen staff brought in his lunch tray. Will see is medications will help with discomfort r/t eating. 1310:  Rounded: checked to see how effective previous medications are working to control discomfort with eating; pt talking with family and friends; no s/s of discomfort. 1340:  Rounded; pt in bed with eyes closed; family present. Pt stated that he is having pain in abdomen 7/10. Administered prn dilaudid 2mg/IV. Will continue to monitor. 1420:  Reassessed pain; pt sleeping; no s/s of pain nor distress. 1500: Went in room to administer scheduled medications. Pt still sleeping; tried to wake him up but unable to arouse. Will hold medications until pt awakes. 1530:  Pt awake; administered previously scheduled medications. Asked pt how his pain is, he stated that it was the same but did not want any medication(s). 1550:  Administered scheduled fentanyl; family present; no s/s of distress. 1630:  Pt in bathroom with wife to take shower. Air mattress came and was placed on bed. 1740:  Pt requesting pain medication; administered prn dilaudid 2mg/IV along with scheduled medications. NAME OF PATIENT:  Gabi Gu    LEVEL OF CARE:  [facility]    REASON FOR GIP:   Pain, despite numerous changes in medications, Nausea and vomiting, despite changes to medications, Medication adjustment that must be monitored 24/7 and Stabilizing treatment that cannot take place at home    *PATIENT REMAINS ELIGIBLE FOR [facility] LEVEL OF CARE AS EVIDENCED BY: (MUST BE ADDRESSED OF PATIENT GIP)  Pt still has uncontrolled pain and is receiving scheduled and prn pain medications; n/v has improved with scheduled zofran.       REASON FOR RESPITE:  n/a    O2 SAFETY:  n/a    FALL INTERVENTIONS PROVIDED:   Implemented/recommended use of non-skid footwear, Implemented/recommended use of fall risk identification flag to all team members, Implemented/recommended resources for alarm system (personal alarm, bed alarm, call bell, etc.) , Implemented/recommended environmental changes (remove hazards, lower bed, improve lighting, etc.) and Implemented/recommended increased supervision/assistance    INTERDISPLINARY COMMUNICATION/COLLABORATION:  Physician, MSW, [place] and RN, CNA    NEW MEDICATION INITIATION DOCUMENTATION:  n/a    Reason medication is being initiated:  n/a    MD / Provider name consulted re: change in status / initiation of new medication: n/a    New Symptom(s):  n/a    New Order(s):  n/a    Name of the person notified of the changes:  n/a    Name of person being taught:  n/a    Instructions given:  n/a    Side Effects taught:  n/a    Response to teaching:  n/a      COMFORTABLE DYING MEASURE:  Is Patient/family satisfied with symptom level?  yes    DISCHARGE PLAN:  Once GIP symptoms have been controlled, MSW will work with family on discharge plans.

## 2018-05-27 NOTE — PROGRESS NOTES
1900: Shift report received from [place], [address]: Pt in bed, drowsy, bur easily arousable and oriented. Wife at the bedside. Pt's lungs are diminished but clear, bowel sounds distant. Abdomen semi-soft with areas that are harder to touch. Abdomen is a little distended. Pt has an Ileostomy and two nephrostomy tubes, left nephrostomy came undone and was recently reinserted at the hospital ([facility] Left side was the one that came undone and is now draining, but lesser amounts than the right side. Both tubes are intact, dressed and bags checked but little output at this time. Pt has  A right chest port. Pt is grimacing and moaning and pain is rated at 7/10. Medicated with scheduled pain meds. Ileostomy bag changed and dated. Wife has supplies in the room. Small amount of stool, some gas. 2030: Wife flushed both nephrostomy tubes. Flushes well. 2130: Pt ambulated to bathroom to clean himself up.  2150: Pt verbalizes pain, walking has increased his pain and he is grimacing and moaning. He reports that the pain meds have helped \"some\" but pain still around 7/10 at this time. Medicated with PRN Fentanyl 100 mcg and scheduled Oxycontin 180mg. 2230: Pt turning in bed and dressing on right nephrostomy has some drainage. Dressing changed/ Dressing on left side was changed more recent due to his procedure, will need to be changed in 2 days, per wife. 2350: Pt resting. Pt medicated with scheduled meds. He states he is still in pain, the PRN dose has only helped \"a little bit\". Will check back after scheduled pain meds had time to take effect. 0100: Pt resting with eyes closed and sitting almost at 90 degrees in the bed. 0230: Pt asking for PRN pain meds. Fentanyl 100 mcg administered at this time. 0310: Pt states his pain is still severe, 7/10, requesting another pain med, he is asking for Tylenol. Tylenol not scheduled for PRN use, suggested to pt to utilize PRN Oxycodone 30 mg and he agrees. Administered at this time. 0320: Pt asking for more pain meds, he can't sleep and is very uncomfortable. PRN Fentanyl 100 mcg and PRN Ativan 1 mg administered per pt request.  0400: Scheduled Fentanyl administered. Pt rates pain at 6/10.  0500: Pt resting.  0600: Scheduled iv meds administered, pt does not wake up. He has shallow respirations and appears very pale. Scheduled oral pain meds held at this time because pt is sleeping. Report given to Sandip Ken RN and she will administer when pt is awake. NAME OF PATIENT:  Adam Olea    LEVEL OF CARE:  GIP    REASON FOR GIP:   Pain, despite numerous changes in medications, Nausea and vomiting, despite changes to medications, Medication adjustment that must be monitored 24/7 and Stabilizing treatment that cannot take place at home    *PATIENT REMAINS ELIGIBLE FOR GIP LEVEL OF CARE AS EVIDENCED BY: (MUST BE ADDRESSED OF PATIENT GIP)  -Pt verbalizes pain rated at 7/10, utilizing different pain medications and utilizing PRN pain meds and still verbalizing pain. O2 SAFETY:  n/a    FALL INTERVENTIONS PROVIDED:   Implemented/recommended use of fall risk identification flag to all team members, Implemented/recommended assistive devices and encouraged their use, Implemented/recommended resources for alarm system (personal alarm, bed alarm, call bell, etc.) , Implemented/recommended environmental changes (remove hazards, lower bed, improve lighting, etc.) and Implemented/recommended increased supervision/assistance    INTERDISPLINARY COMMUNICATION/COLLABORATION:  Physician, MSW, [place] and RN, CNA    NEW MEDICATION INITIATION DOCUMENTATION:  no changes made on this shift    COMFORTABLE DYING MEASURE:  Is Patient/family satisfied with symptom level?  yes    DISCHARGE PLAN:  Pending until symptoms are controlled.

## 2018-05-27 NOTE — PROGRESS NOTES
Problem: Pain  Goal: *Control of Pain  Outcome: Progressing Towards Goal  Pt has not been completely pain free, but he was further educated on scheduled and PRN pain meds and encouraged to voice his needs. Pt has been requesting PRN meds throughout the night, relieving some of his pain, but not all of it. PRN utilization needs to be monitored and scheduled meds need to be adjusted accordingly.

## 2018-05-28 NOTE — PROGRESS NOTES
Problem: Pain  Goal: *Control of Pain  Outcome: Not Progressing Towards Goal  Pt continuous to have intermittent pain of high intensity, rated around 7/10. Utilizing PRN pain and agitation medications. Slight changes in position and turning intensifies pain.

## 2018-05-28 NOTE — PROGRESS NOTES
[address] [facility] Help to Those in Need  [phone]    Patient Name: Doretha Pinto  YOB: 1960    Date of Provider Hospice Visit: 05/28/18    Level of Care:   [x] General Inpatient (GIP)    [] Routine   [] Respite    Current Location of Care:  [] [facility] [] [facility] [] [facility] [] [facility] [x] Hospice House [facility])    IF [facility], patient referred from:  [] [facility] [] [facility] [] [facility] [] [facility] [x] Home [] Other:     Date of Original Hospice Admission: 5/26/18  Hospice Medical Director at time of admission: Dr. Luis Angel Dia Diagnosis: Esophageal cancer with liver mets and retroperitoneal fibrosis  Diagnoses RELATED to the terminal prognosis: Polyfibrosis syndrome, ureteral obstruction with L stent placement       HOSPICE SUMMARY     Doretha Pinto is a 60y.o. year old who was admitted to [facility]. Patient has a history of poly fibrosis syndrome including contractures, keloids, retroperitoneal fibrosis) and esophageal cancer with liver mets diagnosed in 2016. He has retroperitoneal and pelvic fibrosis resulting in ureteral obstruction and left stent then placement of bilateral nephrostomy tubes with recent replacement of tube in early May. He also has DVT related to cancer and immobility. He was receiving chemotherapy as recent as April 2018. He has been suffering from increasing abdominal distention, anasarca and intractable pain. Patient declined paracentesis, left nephrostomy tube replaced at hospital yesterday and then patient transferred to [facility] for pain control and high need of care. Functionally, the patient's Karnofsky and/or Palliative Performance Scale has declined over a period of 4 months and is estimated at 50%.  The patient is dependent on all ADLs    Objective information that support this patients limited prognosis includes: intractable pain, rising creatinine in the setting of ureteral obstruction, anasarca in the setting of lymphatic obstruction and nutritional decline    The patient/family chose comfort measures with the support of Hospice. HOSPICE DIAGNOSES   Active Symptoms:  1. Intractable pain, abdominal    2. Anxiety related to pain  3. Severe debility  4. Poor po intake  5. Sedation side effects     PLAN   1. Abdominal pain- Associated w/ po intake. Severe. Confirmed w/ [facility] that they cannot deliver PCA here. Confirmed w/ [facility] that they cannot supply PCA here or at home given national shortage. Attempts will be made to acquire PCA from another Vendor outside of the [facility] system. Medications are not able to be given at home, high risk for decline, continues with ongoing pain, must be evaluated frequently, may need palliative sedation. 1.  Continue Fentanyl 100 mcg every 4 hours scheduled along with 100 mcg every 1 hour as needed for pain  2. Oxycontin increased to 180 mg three times a day  3. Continue Roxicodone 30 mg every 4 hours as needed and Fentanyl spray. 4. Given shortage of IV opioids, adding back up IV Dilaudid if Fentanyl out of supply. 5. In past has had been tried on mult interventions for pain incl IV Ketamine, Fentanyl patch (no longer absorbing), Methadone po (pt felt it made him feel funny and did not want to take). 6. Given pt's complex sx management, acute pain spikes, and limitation in our ability to get PCA discuss Methadone again w/ pt and wife. 7. Continue Methadone liquid 5mg po tid today. This is part to add to pain control, in part to see how it makes him feel. If tolerates, this might be good option- could titrate up Methadone and in future may be able to decr Oxycontin. Pt has not noticed any improvement with his pain as it relates to methadone, too soon to tell at this point. 8. We also talk about the fact it may be harder for him to take pills in future and Oxycontin cannot be crushed. 2. Anxiety: dced scheduled ativan due to oversedation and give ativan prn .   3. Goals:  Wife and pt's goals are still to try and get home and even get chemo again if possible (LCSW today approached conversation about code status again). However wife also able to tell me that she knows pt is much worse than he was a week ago and that they are even willing to have the side effect of sedation if that is needed for pain control. 5/10 pain is tolerable for him. 4. We discussed at length that goals of care may need to adjusted as pt is having uncontrolled pain and may not be able to be improved from this point. Placed call to Dr Aishwarya Iyer and discussed his situation. She is planning a visit. 5. Disposition: [facility] given intractable pain and care needs    Prognosis estimated based on 05/28/18 clinical assessment is:   [] Hours to Days    [x] Days to Weeks    [] Other:    Communicated plan of care with: Hospice Case Manager; Hospice IDT; Care Team; Dr Ingrid Bull     Resuscitation Status: FULL  Durable DNR: [] Yes [x] No    Advance Care Planning 3/23/2018   Patient's Healthcare Decision Maker is: Named in scanned ACP document   Primary Decision Maker Name Justa Kam   Primary Decision Maker Phone Number [phone]   Primary Decision Maker Relationship to Patient Spouse   Confirm Advance Directive Yes, on file   Does the patient have other document types Power of         HISTORY     History obtained from: chart, patient and wife    CHIEF COMPLAINT: abdominal pain  The patient is:   [x] Verbal  [] Nonverbal  [] Unresponsive    HPI/SUBJECTIVE:    Patient with above mentioned history. Pt very weak. He lets his wife do most of the talking, but does confirm w/ me that he is okay w/ the current plan. Cont to have severe, sharp abdominal pain- worse every time he eats. Pain now a 3-4/10 but earlier today got up to a 7/10.             REVIEW OF SYSTEMS     The following systems were: [x] reviewed  [] unable to be reviewed    Positive ROS include:  Constitutional: fatigue, weakness, in pain, short of breath  Ears/nose/mouth/throat: increased airway secretions  Respiratory:shortness of breath, wheezing  Gastrointestinal:poor appetite, nausea, vomiting, abdominal pain, constipation, diarrhea  Musculoskeletal:pain, deformities, swelling legs  Neurologic:confusion, hallucinations, weakness  Psychiatric:anxiety, feeling depressed, poor sleep  Endocrine:     Adult Non-Verbal Pain Assessment Score: 6 right now    Face  [] 0   No particular expression or smile  [] 1   Occasional grimace, tearing, frowning, wrinkled forehead  [] 2   Frequent grimace, tearing, frowning, wrinkled forehead    Activity (movement)  [] 0   Lying quietly, normal position  [] 1   Seeking attention through movement or slow, cautious movement  [] 2   Restless, excessive activity and/or withdrawal reflexes    Guarding  [] 0   Lying quietly, no positioning of hands over areas of body  [] 1   Splinting areas of the body, tense  [] 2   Rigid, stiff    Physiology (vital signs)  [] 0   Stable vital signs  [] 1   Change in any of the following: SBP > 20mm Hg; HR > 20/minute  [] 2   Change in any of the following: SBP > 30mm Hg; HR > 25/minute    Respiratory  [] 0   Baseline RR/SpO2, compliant with ventilator  [] 1   RR > 10 above baseline, or 5% drop SpO2, mild asynchrony with ventilator  [] 2   RR > 20 above baseline, or 10% drop SpO2, asynchrony with ventilator     FUNCTIONAL ASSESSMENT     Palliative Performance Scale (PPS): 30     PSYCHOSOCIAL/SPIRITUAL ASSESSMENT     Active Problems:    Intractable abdominal pain (5/26/2018)      Anxiety about health (5/26/2018)      Anorexia (5/26/2018)      Esophageal cancer, stage IV ([facility] [address] (5/26/2018)      Retroperitoneal fibrosis (5/26/2018)      Past Medical History:   Diagnosis Date    Other ill-defined conditions(799.89)     polyfibromitosis      Past Surgical History:   Procedure Laterality Date    HX ORTHOPAEDIC      hand surgery      Social History   Substance Use Topics    Smoking status: Never Smoker  Smokeless tobacco: Never Used    Alcohol use No     No family history on file.    Allergies   Allergen Reactions    Levaquin [Levofloxacin] Nausea and Vomiting    Xanax [Alprazolam] Other (comments)     Possible delirium      Current Facility-Administered Medications   Medication Dose Route Frequency    HYDROmorphone (DILAUDID) injection 2 mg  2 mg IntraVENous Q4H PRN    methadone (DOLOPHINE) 10 mg/mL concentrated solution 5 mg  5 mg Oral Q8H    fentaNYL spry 100 mcg (Patient Supplied)  100 mcg SubLINGual PRN    fentaNYL citrate (PF) injection 100 mcg  100 mcg IntraVENous Q4H    fentaNYL citrate (PF) injection 100 mcg  100 mcg IntraVENous Q1H PRN    ondansetron (ZOFRAN) injection 8 mg  8 mg IntraVENous Q4H PRN    ondansetron (ZOFRAN) injection 4 mg  4 mg IntraVENous Q6H    oxyCODONE ER (OxyCONTIN) tablet 180 mg (Patient Supplied)  180 mg Oral Q8H    senna-docusate (PERICOLACE) 8.6-50 mg per tablet 2 Tab  2 Tab Oral BID    apixaban (ELIQUIS) tablet 5 mg (Patient Supplied)  5 mg Oral BID    oxyCODONE IR (ROXICODONE) tablet 30 mg  30 mg Oral Q3H PRN    LORazepam (ATIVAN) injection 1 mg  1 mg IntraVENous Q6H PRN        PHYSICAL EXAM     Wt Readings from Last 3 Encounters:   12/13/17 82.8 kg (182 lb 9.6 oz)   11/08/17 82.8 kg (182 lb 9.6 oz)   04/08/11 95.2 kg (209 lb 14.1 oz)       Visit Vitals    /70 (BP 1 Location: Left arm, BP Patient Position: At rest)    Pulse 83    Temp 98.6 °F (37 °C)    Resp 16    SpO2 98%       Supplemental O2  [] Yes  [x] NO  Last bowel movement:     Currently this patient has:  [] Peripheral IV [] PICC  [x] PORT [] ICD    [] Menjivar Catheter [] NG Tube   [] PEG Tube    [] Rectal Tube [] Drain  [] Other:  B/l nephrostomy tubes, ileostomy     Constitutional: cachectic, ill appearing  Eyes: clear  ENMT: moist, clean  Cardiovascular: reg heart rate  Respiratory: wnl  Gastrointestinal:ileostomy bag with very little stool   Musculoskeletal: no deformities Skin:dry  Neurologic:fatigue , awake, alert   Psychiatric:intact     Pertinent Lab and or Imaging Tests:  Lab Results   Component Value Date/Time    Sodium 135 05/18/2018 02:09 PM    Potassium 5.0 05/18/2018 02:09 PM    Chloride 91 (L) 05/18/2018 02:09 PM    CO2 31 (H) 05/18/2018 02:09 PM    Glucose 109 (H) 05/18/2018 02:09 PM    BUN 23 05/18/2018 02:09 PM    Creatinine 1.75 (H) 05/18/2018 02:09 PM    BUN/Creatinine ratio 13 05/18/2018 02:09 PM    GFR est AA 48 (L) 05/18/2018 02:09 PM    GFR est non-AA 42 (L) 05/18/2018 02:09 PM    Calcium 9.0 05/18/2018 02:09 PM     Lab Results   Component Value Date/Time    Protein, total 5.9 (L) 05/18/2018 02:09 PM    Albumin 3.2 (L) 05/18/2018 02:09 PM           Total time: 60 min   Counseling / coordination time: 45 min   > 50% counseling / coordination?: yes  Met with spouse at length regarding goals of care  discussed also with Dr Cheko Jesus

## 2018-05-28 NOTE — PROGRESS NOTES
NAME OF PATIENT:  Tim Diane    LEVEL OF CARE:  [facility]    REASON FOR GIP:   Pain, despite numerous changes in medications, Medication adjustment that must be monitored 24/7 and Stabilizing treatment that cannot take place at home    *PATIENT REMAINS ELIGIBLE FOR [facility] LEVEL OF CARE AS EVIDENCED BY: Pain continued with need to monitor and adjust-  Reports fentanyl most effective at this point  O2 SAFETY:  On R/A at present denies respiratory distress    FALL INTERVENTIONS PROVIDED:   Implemented/recommended increased supervision/assistance    INTERDISPLINARY COMMUNICATION/COLLABORATION:  Physician, MSW, [place] and RN, CNA    NEW MEDICATION INITIATION DOCUMENTATION:  N/A at present    Reason medication is being initiated:  N/A    MD / Provider name consulted re: change in status / initiation of new medication:  N/A    New Symptom(s):  N/A    New Order(s):  N/A    Name of the person notified of the changes:  N/A    Name of person being taught:  N/A    Instructions given:  N/A    Side Effects taught:  N/A    Response to teaching:  N/A      COMFORTABLE DYING MEASURE:  Is Patient/family satisfied with symptom level?  no and Still need to obtain better pain management    DISCHARGE PLAN:  Home when pain controlled  1938  Prn pain medication for increase moaning. DTRs leaving client interactive with them  Wife remains  Katherin Ley will be back to assess results soon  2035  Pain level at 8 on 1-10 scale described as spastic coming in waves and greater after eating. Reports this has improved s/p previous dose of IV Fentanyl. PRN Fentanyl as per MARs. Client getting ready to watch \"caps\" game. PA completed  2130  C/o nausea and pain at level 7 on 1-10 scale. Request IV pain med and Zofran. Given as per [facility]  Scheduled medications also given. Flushed Nephrostomy tubes with 9cc each and 1 cc to bag. Client requested apple juice and H2o provided.   Advises pain easing up and watching hockey game at [phone]  Continues to have pain to back lower abdomen and R side  Turned and repositioned. Client does not want prn lorazepam at this time but requested more Fentanyl. Also assisted with change of gown as client spilled apple juice. Will continue to monitor  2356 Pain level at about a 7 per client. Scheduled Fentanyl and Zofran given. Wife sleeping in room. Client advises no nausea or other c/os at present. Will continue to monitor  0100 Resting with eyes closed and facial muscles relaxed. No apparent distress. Will continue to monitor  0216  Client given Oxycontin after waking him up. Request shot also. Confirmed he wanted Fentanyl IV for pain and stated yes. Given as per MARs. Client closed eyes and said thanks. Will continue to monitor  0308 Client with closed eyes. Facial muscles relaxed. No apparent distress. 0410  Client awake and moaning softly when I entered room. Advises pain increase to an 8 on 1-10 scale. Feels as cramping sensation. Fentanyl given IV. Will monitor for effect  0438 Resting with eyes closed at present. 4127 Scheduled medications given. Client with pain at level 7 on 1-10 scale asked about groin swelling and R groin area noted to be swollen-wife reports DVT was on L. No lower extremity swelling. Wanted to know if steroid would help that pain and advised I had not seen that used for pain associated with DVT but maybe Dr Russ Alamo would have an idea. Also, discussed possibility of increasing methadone as this works on multiple pain pathways. Wife report had only used this about 2 days before and didn't like feeling flat. Advised maybe with other pain medication this would not be as bothersome this time. Ice pack provided and additional Ice water. Emptied illiostomy and nephrostomy tubes. Will continue to monitor 250ml thick semi liquid stool brown color. 75 CC yellow urine from L nephrostomy tube  R with pink tinged urine 200 CC.

## 2018-05-28 NOTE — PROGRESS NOTES
0700  Report received. 0730  Pt lying in bed,moaning. Brows furrowed. Pt complaining of pain in his abdomen. Pt reports pain is 6/10. Lungs clear no shortness of breath noted. Faint bowel sounds. Abd tender to touch. Last reported bm was last night. Pt has an iliostomy   Pt only passes drops of urine. No edema noted. Pt also has 2 nephrostomy tubes that are draining to gravity. Pt has a right chest PAC.    0750  Pt medicated with Scheduled Fentanyl. 0800  Pt reports pain is a 5 and that is the best it is going to get. 0950  Pt medicated with scheduled Oxycontin 180mg. Pt unable to take it overnight due to lethargy. NP Terry Trujilloam in to visit and discuss treatment plan and DNR.   1200  Pt medicated with scheduled meds. Pt reports pain is 6/10. Pt also stated he wants to hold his lunch for a little while. 1350  Pt complained of pain 7/10. Pt medicated with Dilaudid. 1430  Pt asleep. No facial grimacing or moaning at this time. 1610  Pt moaning. Pt reports pain is 6/10. Pt medicated with Fentanyl  1650  Pt reports pain is better, very lethargic but arousable. 1800  Pt reports pain is 7/10. Fentanyl given. Scheduled zofran iv, Elequis andOxycontin given. Depend changed,  Neph tube emptied. R 225. L 100. Pt also has a small amt of soft stool in his ileostomy. 1830  Pt resting comfortably. 1900  Report given. NAME OF PATIENT:  Cari Quinn    LEVEL OF CARE:  GIP    REASON FOR GIP:   Pain, despite numerous changes in medications, Medication adjustment that must be monitored 24/7 and Stabilizing treatment that cannot take place at home    *PATIENT REMAINS ELIGIBLE FOR [facility]IP LEVEL OF CARE AS EVIDENCED BY: (MUST BE ADDRESSED OF PATIENT GIP)  Pt received 4 doses of Fentanyl  IV, Zofran IV x 2 doses, Dilaudid IV x 1 dose for pain not controlled.   He is also getting Methadone and scheduled Oxycontin 180 mg BID      REASON FOR RESPITE:  n/a    O2 SAFETY:  RA    FALL INTERVENTIONS PROVIDED:   Implemented/recommended use of non-skid footwear, Implemented/recommended resources for alarm system (personal alarm, bed alarm, call bell, etc.)  and Implemented/recommended environmental changes (remove hazards, lower bed, improve lighting, etc.)    INTERDISPLINARY COMMUNICATION/COLLABORATION:  Physician, MSW, [place] and RN, CNA    NEW MEDICATION INITIATION DOCUMENTATION:  No new medications initiated. Reason medication is being initiated:  n/a    MD / Provider name consulted re: change in status / initiation of new medication:  n/a    New Symptom(s):  n/a    New Order(s):  n/a    Name of the person notified of the changes:  n/a    Name of person being taught:  n/a    Instructions given:  n/a    Side Effects taught: n/a    Response to teaching:  n/a      COMFORTABLE DYING MEASURE:  Is Patient/family satisfied with symptom level?  yes    DISCHARGE PLAN:  Pt will remain at the [facility] for symptom management until his symptoms are managed and then he will return home and continue to be followed by Home Hospice.

## 2018-05-29 NOTE — PROGRESS NOTES
Problem: Pain  Goal: *Control of Pain  Outcome: Progressing Towards Goal  Prn and scheduled medications to aid pain      Problem: Falls - Risk of  Goal: *Absence of Falls  Document Caio Fall Risk and appropriate interventions in the flowsheet. Outcome: Progressing Towards Goal  Fall Risk Interventions:  Mobility Interventions: Bed/chair exit alarm         Medication Interventions: Patient to call before getting OOB    Elimination Interventions: Bed/chair exit alarm, Call light in reach             Problem: Pressure Injury - Risk of  Goal: *Prevention of pressure injury  Document Antonio Scale and appropriate interventions in the flowsheet.    Outcome: Progressing Towards Goal  Pressure Injury Interventions:  Sensory Interventions: Assess changes in LOC         Activity Interventions: Pressure redistribution bed/mattress(bed type)    Mobility Interventions: HOB 30 degrees or less    Nutrition Interventions: Document food/fluid/supplement intake    Friction and Shear Interventions: Apply protective barrier, creams and emollients, HOB 30 degrees or less, Lift sheet

## 2018-05-29 NOTE — PROGRESS NOTES
[address] [place] [address] [facility]  [phone]    Patient Name: Nidhi Parr  YOB: 1960    Date of Provider Hospice Visit: 05/29/18    Level of Care:   [x] General Inpatient (GIP)    [] Routine   [] Respite    Current Location of Care:  [] [facility] [] [facility] [] [facility] [] [facility] [x] [facility])    Associated w/ po intake. Severe. Confirmed w/ [facility] that they cannot deliver PCA here. Confirmed w/ [facility] that they cannot supply PCA here or at home given national shortage. Attempts will be made to acquire PCA from another Vendor outside of the [facility] system. Medications are not able to be given at home, high risk for decline, continues with ongoing pain, must be evaluated frequently, may need palliative sedation. HOSPICE DIAGNOSES   Active Symptoms:  1. Intractable pain, abdominal    2. Nausea, with vomiting  3. Severe debility  4. Weight loss/ malnutrition  5. Sedation side effects     PLAN   1. Abdominal pain: intractable    1. Increase total dose to 200mcg every 3 hours = 1600mcg (Total Fentanyl 600mcg + 700mcg = 1300mcg/24 hours)  2. Start Dexamethasone 4mg IV bid  3. Start oral Protonix 40mg  4. Change Oxycontin 180 tid to Methadone liquid 15mg tid (may still need breakthrough doses of Oxycontin with transition, discussed with nurse)    Last 24 hours of medication use:   1. Fentanyl 100 mcg IV every 4 hours   2. Oxycontin 180 mg PO three times a day  3. Methadone liquid 5mg PO three times a day  3. PRN Oxycodone IR 30mg PO - no recent use  4. PRN Fentanyl 100mcg IV every 1 hour - 10;18;20;21;22;02;06 (7 doses)  5. PRN Fentanyl spray 400mcg  6. PRN Dilaudid 2mg IV - 04,13 (2 doses)    2. Nausea:  1. Zofran 4mg IV every 6 hours + 1 prn dose at 2100 5/28  2. Ativan 1mg IV every 6 hours - last dose 3am 5/28 (>24 hours)  3. ADD DEXAMETHASONE AS ABOVE    3. Goals:   Discussion this morning regarding advanced disease status.  I suspect this is abdominal carcinomatosis with studding of bowel. Symptoms and nutrition as well as severe decline in function are indicators that we are not at a reversible point and further chemo would harm more than help. He agreed. Discussed focus of care is comfort which he wants. He asked what we would do if we just can't get symptoms under control and we discussed palliative sedation. He was relieved to know that this is an option with severe intractable suffering. We discussed if he had a preference re site of natural death and he will think about this; right now current regimen cannot be done in the home but we would work for this if that were his preference. Also discussed resuscitation and he agreed this would not be what he would want. We signed a durable DNR and entered DNR order. He wanted to know about prognosis. We discussed few to many weeks and this is what he thought as well. This will allow him to plan for things that are important to him. Discussed with Quin Chaves, his wife after. She will be meeting with SW today to discuss some things. Their  has come to visit already and she is considering FMLA. Disposition: [facility] given intractable pain and care needs    Prognosis estimated based on 05/30/18 clinical assessment is:   [] Hours to Days    [x] Days to Weeks    [] Other:    Communicated plan of care with: Hospice Case Manager;  Hospice IDT; Care Team     GOALS OF CARE     Resuscitation Status: DNR  Durable DNR: [x] Yes [] No    Advance Care Planning 3/23/2018   Patient's Healthcare Decision Maker is: Named in scanned ACP document   Primary Decision Maker Name Amber Castanon   Primary Decision Maker Phone Number [phone]   Primary Decision Maker Relationship to Patient Spouse   Confirm Advance Directive Yes, on file   Does the patient have other document types Power of       HISTORY     History obtained from: chart, patient and wife    CHIEF COMPLAINT: abdominal pain  The patient is:   [x] Verbal  [] Nonverbal  [] Unresponsive    HPI/SUBJECTIVE:    Patient with above mentioned history.      Severe pain in abdomen  Diffuse, not generalized  Medications only covering it some but not truly relieving it  Tolerable pain level is 6 but he has been at 8-10  Tried to get out of bed yesterday but too much exertion to change own bags now  Fatigued     REVIEW OF SYSTEMS     The following systems were: [x] reviewed  [] unable to be reviewed    Positive ROS include:  Constitutional: fatigue, weakness, in pain  Ears/nose/mouth/throat:   Respiratory:   Gastrointestinal:poor appetite, nausea, vomiting, abdominal pain  Musculoskeletal:leg swelling but no difference/worse  Neurologic:  Psychiatric:  Endocrine:     Adult Non-Verbal Pain Assessment Score: N/A    Face  [] 0   No particular expression or smile  [] 1   Occasional grimace, tearing, frowning, wrinkled forehead  [] 2   Frequent grimace, tearing, frowning, wrinkled forehead    Activity (movement)  [] 0   Lying quietly, normal position  [] 1   Seeking attention through movement or slow, cautious movement  [] 2   Restless, excessive activity and/or withdrawal reflexes    Guarding  [] 0   Lying quietly, no positioning of hands over areas of body  [] 1   Splinting areas of the body, tense  [] 2   Rigid, stiff    Physiology (vital signs)  [] 0   Stable vital signs  [] 1   Change in any of the following: SBP > 20mm Hg; HR > 20/minute  [] 2   Change in any of the following: SBP > 30mm Hg; HR > 25/minute    Respiratory  [] 0   Baseline RR/SpO2, compliant with ventilator  [] 1   RR > 10 above baseline, or 5% drop SpO2, mild asynchrony with ventilator  [] 2   RR > 20 above baseline, or 10% drop SpO2, asynchrony with ventilator     FUNCTIONAL ASSESSMENT     Palliative Performance Scale (PPS): 50     PSYCHOSOCIAL/SPIRITUAL ASSESSMENT     Active Problems:    Intractable abdominal pain (5/26/2018)      Anxiety about health (5/26/2018)      Anorexia (5/26/2018)      Esophageal cancer, stage IV (HCC) (5/26/2018)      Retroperitoneal fibrosis (5/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    Allergies   Allergen Reactions    Levaquin [Levofloxacin] Nausea and Vomiting    Xanax [Alprazolam] Other (comments)     Possible delirium      Current Facility-Administered Medications   Medication Dose Route Frequency    HYDROmorphone (DILAUDID) injection 3 mg  3 mg IntraVENous ONCE PRN    fentaNYL citrate (PF) injection 200 mcg  200 mcg IntraVENous Q3H    methadone (DOLOPHINE) 10 mg/mL concentrated solution 15 mg  15 mg Oral Q8H    dexamethasone (DECADRON) 4 mg/mL injection 4 mg  4 mg IntraVENous BID    oxyCODONE IR (ROXICODONE) tablet 60 mg  60 mg Oral Q3H PRN    pantoprazole (PROTONIX) tablet 40 mg  40 mg Oral DAILY    fentaNYL citrate (PF) injection 200 mcg  200 mcg IntraVENous Q1H PRN    ondansetron (ZOFRAN) injection 8 mg  8 mg IntraVENous Q4H PRN    ondansetron (ZOFRAN) injection 4 mg  4 mg IntraVENous Q6H    senna-docusate (PERICOLACE) 8.6-50 mg per tablet 2 Tab  2 Tab Oral BID    apixaban (ELIQUIS) tablet 5 mg (Patient Supplied)  5 mg Oral BID    LORazepam (ATIVAN) injection 1 mg  1 mg IntraVENous Q6H PRN        PHYSICAL EXAM     Wt Readings from Last 3 Encounters:   12/13/17 182 lb 9.6 oz (82.8 kg)   11/08/17 182 lb 9.6 oz (82.8 kg)   04/08/11 209 lb 14.1 oz (95.2 kg)       Visit Vitals    /80    Pulse 72    Temp 97 °F (36.1 °C)    Resp 16    SpO2 97%       Supplemental O2  [] Yes  [x] NO  Last bowel movement:     Currently this patient has:  [] Peripheral IV [] PICC  [x] PORT [] ICD    [] Menjivar Catheter [] NG Tube   [] PEG Tube    [] Rectal Tube [] Drain  [] Other:  B/l nephrostomy tubes, ileostomy     Constitutional: cachectic, ill appearing  Eyes: clear  ENMT: moist, clean  Cardiovascular: reg heart rate  Respiratory: nonlabored respirations  Gastrointestinal: ileostomy bag with stool, nephrostomy tubes draining  Musculoskeletal: no deformities, edema 3+ right/2+ left, scrotal edeam  Skin:dry  Neurologic:fatigue , awake, alert   Psychiatric:intact     Pertinent Lab and or Imaging Tests:  Lab Results   Component Value Date/Time    Sodium 135 05/18/2018 02:09 PM    Potassium 5.0 05/18/2018 02:09 PM    Chloride 91 (L) 05/18/2018 02:09 PM    CO2 31 (H) 05/18/2018 02:09 PM    Glucose 109 (H) 05/18/2018 02:09 PM    BUN 23 05/18/2018 02:09 PM    Creatinine 1.75 (H) 05/18/2018 02:09 PM    BUN/Creatinine ratio 13 05/18/2018 02:09 PM    GFR est AA 48 (L) 05/18/2018 02:09 PM    GFR est non-AA 42 (L) 05/18/2018 02:09 PM    Calcium 9.0 05/18/2018 02:09 PM     Lab Results   Component Value Date/Time    Protein, total 5.9 (L) 05/18/2018 02:09 PM    Albumin 3.2 (L) 05/18/2018 02:09 PM           Total time: 90 min   Counseling / coordination time: 60 min   > 50% counseling / coordination?: yes

## 2018-05-29 NOTE — PROGRESS NOTES
0700  Report received. 0720  Pt lying in bed, awake but lethargic. Pt reports pain is 7/10. Pain located in his abdomen. Lungs clear No dyspnea noted. Pt is on RA. Bowel sounds absent. Pt has an ileostomy that has a very small amt of brown soft stool. Adb is tender to touch. Pt is wearing a depend. His bilateral nephrostomy tubes are draining to gravity. Urine is light straw colored. No edema noted. Pt has a right chest PAC. 0740  Pt medicated with Fentanyl IV and his PO meds. 0800  Pt alseep. No facial grimacing or moaning. 4109  Dr Elgin Cancino in to visit. Medication changes made. IV Fentanyl increased to 200 q 3 hrs PRN. 1030  Pt reports pain is 7.5/10. Pt medicated with Fentanyl PRN. Pt also received scheduled IV dex and PO oxycontin 180mg. 1100  Pt resting. No complaints at this time. 1215  Pt medicated with scheduled IV Fentanyl and IV zofran. Pt reports pain is 6/10.    1300  Pt asleep. No facial grimacing. 1430  Pt complaining of abdomen pain. Pt medicated with Fentanyl. 1600  Pt complains of pain 7.5/10 in his abdomen. Pt medicated with Fentanyl. 1743  Pt complained of abdominal pain. 7/10  Pt medicated with Fentanyl  Order changed from every 2 hours PRN  to hourly PRN  1851  Pt medicated with scheduled Fentanyl. Pain 6/10 in his abdomen. 1900  Report given. NAME OF PATIENT:  Gabi Gu    LEVEL OF CARE:  GIP    REASON FOR GIP:   Pain, despite numerous changes in medications, Medication adjustment that must be monitored 24/7 and Stabilizing treatment that cannot take place at home    *PATIENT REMAINS ELIGIBLE FOR GIP LEVEL OF CARE AS EVIDENCED BY: (MUST BE ADDRESSED OF PATIENT GIP)  Pt continues to receive IV medications. Fentanyl changed from 100mcg every 4 hours to Fentanyl 200mcg every 3 hours PRN.         REASON FOR RESPITE:  n/a      O2 SAFETY:  RA    FALL INTERVENTIONS PROVIDED:   Implemented/recommended use of fall risk identification flag to all team members, Implemented/recommended resources for alarm system (personal alarm, bed alarm, call bell, etc.)  and Implemented/recommended environmental changes (remove hazards, lower bed, improve lighting, etc.)    INTERDISPLINARY COMMUNICATION/COLLABORATION:  Physician, MSW, Zari and RN, CNA    NEW MEDICATION INITIATION DOCUMENTATION:  Pt started on Dexamethasone IV. Consulted AT MD to report change in pt status, Obtained Order from Provider for initiation of symptom relief medication /other medication needed and Documentation completed in Clinical Note in [facility]    Reason medication is being initiated:  Pain control    MD / Provider name consulted re: change in status / initiation of new medication:  DR Christos Kate Symptom(s):  Pain continues to be primary symptom to control. New Order(s):  Dexamethasone 4mg IV 2 x daily. Name of the person notified of the changes:  Pt and Ms Tita Menjivar. Name of person being taught:  Mr and Mrs Tita Menjivar. Instructions given:  yes    Side Effects taught:  yes    Response to teaching:  Yes, family very appreciative. COMFORTABLE DYING MEASURE:  Is Patient/family satisfied with symptom level?  yes    DISCHARGE PLAN:  Pt will remain at the [facility] for symptom management. Once symptoms are managed pt will return home and continue to be followed by Home [facility].

## 2018-05-30 NOTE — PROGRESS NOTES
0700  Report received. 0740  Pt lying in bed. Awake. Pt reports pain is 7 or 8 /10 in his abdomen. He is also complaining of a lot of belching and indigestion. Lungs clear. No dyspnea noted. Pt is on RA. No bowel sounds present. Abd firm and tender. Pt has an iliostomy that has a small amt of soft stool present. Pt also has bilateral nephrostomy tubes draining straw colored urine. No edema noted. Skin is warm to touch. Pt has a right C- PAC.    0756  Pt medicated with Fentanyl, Dexamethasone and PO am meds. 0800  Pt having nausea and vomiting. Emesis was dark brown with undigested particles of food. Pt medicated with Zofran. 0820  Pt sleeping. No signs of discomfort at this time. 0900  Pt medicated with scheduled Fentanyl IV.    0930  Pt reports pain is alittle better. 1000  Pt visiting with his best friend. Pt refused meds at the time. 1230 Pt medicated with scheduled Fentanyl iv.    1300  Pt resting. Pt reports that he is ok for now. 1400  Pt complains of abd pain 7/10. Pt medicated with Dilaudid IV.    1500  Pt continues to complain of pain. Pt requesting Tylenol. 1517  Pt medicated with tylenol. 1730  Pt asleep.     1900  Report given      NAME OF PATIENT:  Gita Garcia    LEVEL OF CARE:  [facility]    REASON FOR GIP:   Pain, despite numerous changes in medications, Medication adjustment that must be monitored 24/7 and Stabilizing treatment that cannot take place at home    *PATIENT REMAINS ELIGIBLE FOR GIP LEVEL OF CARE AS EVIDENCED BY: (MUST BE ADDRESSED OF PATIENT GIP)  Pr continues to receive numerous doses of IV  scheduled and PRN medications       REASON FOR RESPITE:  n/a    O2 SAFETY:  RA        FALL INTERVENTIONS PROVIDED:   Implemented/recommended resources for alarm system (personal alarm, bed alarm, call bell, etc.)  and Implemented/recommended environmental changes (remove hazards, lower bed, improve lighting, etc.)    INTERDISPLINARY COMMUNICATION/COLLABORATION:  Physician, MSW, Zari and RN, CNA    NEW MEDICATION INITIATION DOCUMENTATION:  No new medications initiated. Reason medication is being initiated:  N/a    MD / Provider name consulted re: change in status / initiation of new medication:  n/a    New Symptom(s):  n/a    New Order(s):  n/a    Name of the person notified of the changes:  n/a    Name of person being taught:  n/a    Instructions given:  n/a    Side Effects taught:  n/a    Response to teaching:  n/a      COMFORTABLE DYING MEASURE:  Is Patient/family satisfied with symptom level?  no    DISCHARGE PLAN:  Pt will remain at the [facility] for symptom management until his symptoms are will managed.

## 2018-05-30 NOTE — PROGRESS NOTES
[facility]  [phone]    Patient Name: Gabi Gu  YOB: 1960    Date of Provider Hospice Visit: 5/30/2018      Level of Care:   [x] General Inpatient (GIP)    [] Routine   [] Respite    Current Location of Care:  [] [facility] [] [facility] [] [facility] [] [facility] [x] Hospice House [facility])    Continue GIP: Severe abdominal pain with or without po intake, currently 7.5/10 and is not controlled on a daily basis. Confirmed w/ [facility] that they cannot deliver PCA here. Confirmed w/ [facility] that they cannot supply PCA here or at home given national shortage. Attempts will be made to acquire PCA from another Vendor outside of the [employer] system. Medications are not able to be given at home, high risk for decline, continues with ongoing pain, must be evaluated frequently, may need palliative sedation. HOSPICE DIAGNOSES   Active Symptoms:  1. Intractable pain, abdominal    2. Nausea, with vomiting  3. Severe debility  4. Weight loss/ malnutrition  5. Sedation side effects  6. Abdominal distention with cramping     PLAN   1. Abdominal pain: intractable    1. Increased fentanyl IV push total dose to 200mcg every 3 hours = 1600mcg (Total Fentanyl +600mcg = 2200 mcg/24hours, required breakthrough 4 x  2. Continue  Dexamethasone 4mg IV bid  3. Continue Protonix 40mg  4. dcd Oxycontin 180 tid   5. Continue  Methadone liquid     6. Continue  dexamethasone          2. Nausea:  1. Zofran 4mg IV every 6 hours, 1 dose this morning  2. Ativan 1mg IV every 6 hours    3. ADD DEXAMETHASONE AS ABOVE    3. Goals: As per discussion on 5/29/18  Discussion this morning regarding advanced disease status. I suspect this is abdominal carcinomatosis with studding of bowel. Symptoms and nutrition as well as severe decline in function are indicators that we are not at a reversible point and further chemo would harm more than help. He agreed. Discussed focus of care is comfort which he wants. He asked what we would do if we just can't get symptoms under control and we discussed palliative sedation. He was relieved to know that this is an option with severe intractable suffering. We discussed if he had a preference re site of natural death and he will think about this; right now current regimen cannot be done in the home but we would work for this if that were his preference. Also discussed resuscitation and he agreed this would not be what he would want. We signed a durable DNR and entered DNR order. He wanted to know about prognosis. We discussed few to many weeks and this is what he thought as well. This will allow him to plan for things that are important to him. As per discussion 5/30/18  Follow up discussion today with spouse  Plan is to continue GIP level of care as long as it is needed  If pt is ever more comfortable to go home we will make it happen, however very difficult situation with his pain as it is not under control. We also discussed keeping him here as this seems best for him and his family as his medications have to be given frequently and using the IV push dosing. Disposition: [facility] given intractable pain and care needs    Prognosis estimated based on 05/30/18 clinical assessment is:   [] Hours to Days    [x] Days to Weeks    [] Other:    Communicated plan of care with: Hospice Case Manager;  Hospice IDT; Care Team     GOALS OF CARE     Resuscitation Status: DNR  Durable DNR: [x] Yes [] No    Advance Care Planning 3/23/2018   Patient's Healthcare Decision Maker is: Named in scanned ACP document   Primary Decision Maker Name Marcel Rico   Primary Decision Maker Phone Number [phone]   Primary Decision Maker Relationship to Patient Spouse   Confirm Advance Directive Yes, on file   Does the patient have other document types Power of       HISTORY     History obtained from: chart, patient and wife    CHIEF COMPLAINT: abdominal pain  The patient is:   [x] Verbal  [] Nonverbal  [] Unresponsive    HPI/SUBJECTIVE:    Patient with above mentioned history.      Severe pain in abdomen  Diffuse, not generalized  Medications only covering it some but not truly relieving it  Tolerable pain level is 6 but he has been at 7.5-10   Fatigue, weakness     REVIEW OF SYSTEMS     The following systems were: [x] reviewed  [] unable to be reviewed    Positive ROS include:   Constitutional: fatigue, weakness, in pain  Ears/nose/mouth/throat:   Respiratory:   Gastrointestinal:poor appetite, nausea, vomiting, abdominal pain  Musculoskeletal:leg swelling but no difference/worse  Neurologic:  Psychiatric:  Endocrine:     Adult Non-Verbal Pain Assessment Score: N/A    Face  [] 0   No particular expression or smile  [] 1   Occasional grimace, tearing, frowning, wrinkled forehead  [] 2   Frequent grimace, tearing, frowning, wrinkled forehead    Activity (movement)  [] 0   Lying quietly, normal position  [] 1   Seeking attention through movement or slow, cautious movement  [] 2   Restless, excessive activity and/or withdrawal reflexes    Guarding  [] 0   Lying quietly, no positioning of hands over areas of body  [] 1   Splinting areas of the body, tense  [] 2   Rigid, stiff    Physiology (vital signs)  [] 0   Stable vital signs  [] 1   Change in any of the following: SBP > 20mm Hg; HR > 20/minute  [] 2   Change in any of the following: SBP > 30mm Hg; HR > 25/minute    Respiratory  [] 0   Baseline RR/SpO2, compliant with ventilator  [] 1   RR > 10 above baseline, or 5% drop SpO2, mild asynchrony with ventilator  [] 2   RR > 20 above baseline, or 10% drop SpO2, asynchrony with ventilator     FUNCTIONAL ASSESSMENT     Palliative Performance Scale (PPS): 40%     PSYCHOSOCIAL/SPIRITUAL ASSESSMENT     Active Problems:    Intractable abdominal pain (5/26/2018)      Anxiety about health (5/26/2018)      Anorexia (5/26/2018)      Esophageal cancer, stage IV ([facility] [address] (5/26/2018)      Retroperitoneal fibrosis (5/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    Allergies   Allergen Reactions    Levaquin [Levofloxacin] Nausea and Vomiting    Xanax [Alprazolam] Other (comments)     Possible delirium      Current Facility-Administered Medications   Medication Dose Route Frequency    HYDROmorphone (DILAUDID) injection 3 mg  3 mg IntraVENous ONCE PRN    fentaNYL citrate (PF) injection 200 mcg  200 mcg IntraVENous Q3H    methadone (DOLOPHINE) 10 mg/mL concentrated solution 15 mg  15 mg Oral Q8H    dexamethasone (DECADRON) 4 mg/mL injection 4 mg  4 mg IntraVENous BID    oxyCODONE IR (ROXICODONE) tablet 60 mg  60 mg Oral Q3H PRN    pantoprazole (PROTONIX) tablet 40 mg  40 mg Oral DAILY    fentaNYL citrate (PF) injection 200 mcg  200 mcg IntraVENous Q1H PRN    ondansetron (ZOFRAN) injection 8 mg  8 mg IntraVENous Q4H PRN    ondansetron (ZOFRAN) injection 4 mg  4 mg IntraVENous Q6H    senna-docusate (PERICOLACE) 8.6-50 mg per tablet 2 Tab  2 Tab Oral BID    apixaban (ELIQUIS) tablet 5 mg (Patient Supplied)  5 mg Oral BID    LORazepam (ATIVAN) injection 1 mg  1 mg IntraVENous Q6H PRN        PHYSICAL EXAM     Wt Readings from Last 3 Encounters:   12/13/17 82.8 kg (182 lb 9.6 oz)   11/08/17 82.8 kg (182 lb 9.6 oz)   04/08/11 95.2 kg (209 lb 14.1 oz)       Visit Vitals    /80    Pulse 72    Temp 97 °F (36.1 °C)    Resp 16    SpO2 97%       Supplemental O2  [] Yes  [x] NO  Last bowel movement:     Currently this patient has:  [] Peripheral IV [] PICC  [x] PORT [] ICD    [] Menjivar Catheter [] NG Tube   [] PEG Tube    [] Rectal Tube [] Drain  [] Other:  B/l nephrostomy tubes, ileostomy Constitutional: cachectic, ill appearing, very pale  Eyes: clear  ENMT: moist, clean  Cardiovascular: reg heart rate  Respiratory: nonlabored respirations  Gastrointestinal: ileostomy bag with stool, nephrostomy tubes draining  Musculoskeletal: no deformities, edema 3+ right/2+ left, scrotal edema  Skin:dry, very pale  Neurologic:fatigue , awake, alert   Psychiatric: calm    Pertinent Lab and or Imaging Tests:  Lab Results   Component Value Date/Time    Sodium 135 05/18/2018 02:09 PM    Potassium 5.0 05/18/2018 02:09 PM    Chloride 91 (L) 05/18/2018 02:09 PM    CO2 31 (H) 05/18/2018 02:09 PM    Glucose 109 (H) 05/18/2018 02:09 PM    BUN 23 05/18/2018 02:09 PM    Creatinine 1.75 (H) 05/18/2018 02:09 PM    BUN/Creatinine ratio 13 05/18/2018 02:09 PM    GFR est AA 48 (L) 05/18/2018 02:09 PM    GFR est non-AA 42 (L) 05/18/2018 02:09 PM    Calcium 9.0 05/18/2018 02:09 PM     Lab Results   Component Value Date/Time    Protein, total 5.9 (L) 05/18/2018 02:09 PM    Albumin 3.2 (L) 05/18/2018 02:09 PM           Total time: 60 min   Counseling / coordination time: 40 min   > 50% counseling / coordination?: yes  Grace Ritchie NP

## 2018-05-30 NOTE — PROGRESS NOTES
1900 Report received from [facility]. Pt is GIP level of  Care. Dx Metastatic Gastroesophageal Cancer with mets. 1930 Pt is resting in bed with his 2 daughters at the bedside. Wife is in the family room. 1950 Pt states he has abd pain, rates as 8 of 10 and request med. Medicated with prn Fentanyl IV. Pt is burping but denies nausea. 2030 Pt voices relief of pain, now rates as 5 of 10. Wife is in family room. She is tearful. States they received bad news today and it is a lot to process. 2100 Scheduled medications given for pain relief. Bilateral nephrostomy tubes flushed with 10ml NS each. Refused scheduled Senna. 2200 Wife assisting pt to change colostomy and do nephrostomy tube care. 2300 Pt c/o pain and indigestion. Has lots of burping. PRN dose Fentanyl and Zofran given for symptoms  2350 Pt c/o nausea. No vomiting. PRN Ativan and scheduled meds given to manage symptoms. 0100 Pt resting quietly. Appears to be asleep  0215 Continues to rest quietly. Appears to be asleep. Sitting in semi fowlers position. 0300 Scheduled Fentanyl required and given to manage pain. 0400 Pt continues to sleep with scheduled medication given to manage pain. 0500  Resting with eyes closed. 2283 Awakened with c/o abd pain 7 of 10. Medicated with Scheduled Fentanyl, Methadone and Zofran due at this time.       NAME OF PATIENT:  Jass Klein    LEVEL OF CARE:  [facility]    REASON FOR GIP:   Pain, despite numerous changes in medications, Medication adjustment that must be monitored 24/7 and Stabilizing treatment that cannot take place at home    *PATIENT REMAINS ELIGIBLE FOR GIP LEVEL OF CARE AS EVIDENCED BY: (MUST BE ADDRESSED OF PATIENT GIP) Frequent assessment and medication changes required to manage symptoms      REASON FOR RESPITE:  na    O2 SAFETY:  na    FALL INTERVENTIONS PROVIDED:   Implemented/recommended use of fall risk identification flag to all team members and Implemented/recommended environmental changes (remove hazards, lower bed, improve lighting, etc.)    INTERDISPLINARY COMMUNICATION/COLLABORATION:  Physician, MSW, Zari and RN, CNA    NEW MEDICATION INITIATION DOCUMENTATION:  Documentation completed in Clinical Note in [facility]    Reason medication is being initiated:  na    MD / Provider name consulted re: change in status / initiation of new medication:  No new meds this time period    New Symptom(s):  Ongoing pain    New Order(s):  na    Name of the person notified of the changes:  na    Name of person being taught:  na    Instructions given:  na    Side Effects taught:  na    Response to teaching:  na      COMFORTABLE DYING MEASURE:  Is Patient/family satisfied with symptom level? Ongoing med changes to obtain pain relief    DISCHARGE PLAN:  Remain at [facility] until symptoms can be managed at home.

## 2018-05-30 NOTE — HOSPICE
Visit to see patient today - he was in bed awake and alert. Easily engaged in conversation. Wife meeting with Dr. William Brunner. She returned to room following her conversation. She expressed concern over the inability to obtain  PCA pump medication - Dr. William Brunner informed her that patient wouldn't be able to return home without one unless she had an RN for the IV push that he is currently using. Wife had questions about GIP and eventual change to [facility] LOC and private pay. Msw explained it was a complicated case and we should meet with Dr. Mabel Lowry to discuss further. She was agreeable. Msw to see if Dr. Mabel Lowry or Angela Agee can meet tomorrow. Wife also requested a letter be faxed to 's employer notifying them he was in Hospice care.

## 2018-05-30 NOTE — PROGRESS NOTES
[facility] SW/CH VISIT    [facility] Hospice Chaplain Initial Visit and Spiritual Assessment    I had tried to visit this patient earlier this week but found him either in too much pain for a visit or asleep. Today, I visited the room of this patient who is @ [facility] on GIP status. He was in bed, responsive, breathing via open mouth displaying a slightly irregular pattern. He was not agitated, but was in pain in his stomach area and was in significant pain if/when he moved. His bother and his best friend were visiting him. His spirits were moderate and he spoke openly about his [employer], [employer]ney Cruza, and his [employer] on [place]. I introduced myself, discussed my role as  within the care team and offered on-going pastoral care and spiritual support. He thanked me for my offer. The patient, his visitors, and I then had a wide-ranging discussion about growing up in [place] and the various trouble we all appeared to find in our younger days. He was laughing at time but obviously in pain. NP John Diaz came in and discussed his pain and suggested earlier medication than scheduled. His friend, Fco Hester, served in the [employer] and the patient worked at [employer] for 20 years. He asked me to offer a prayer which I did and a blessing. This afternoon, I provided a ministry of presence, active listening to Jen Simms, and Fco Mir,, offered words of comfort, assurance, spiritual encouragement, as well as offered a prayer and a blessing. Lorrie Paz asked me to visit whenever I could. I also gave him my contact information and indicated she could reach me if needed or desired. GOALS:  Continue to visit this patient and his family, o provide pastoral care and spiritual support to assist both the patient and them with coping with both the pain and this decline.    Build trust and familiarity with the family to encourage a discussion of their spiritual thoughts and concerns at a deeper and more personal level if they so choose. Validate the emotions and normalize the anticipatory grief of the family regarding this declining situation. Coordinate with his local [place] if so requested. PLAN:   Continue to visit this patient and his family, while he is @ [facility] and I am in this facility, or PRN as requested. Coordinate with/ Sonido Lopez and assume  Responsibilities for this patient/family. Coordinate with Care Team on POC.  VISIT FREQUENCY:   1 wk. 2 starting 5/30 plus 4 prn 14 days.    Jeet Thompson, [address] [facility] [place]  [phone]

## 2018-05-30 NOTE — HOSPICE
Wife requested to meet again with msw this afternoon - she had further questions/concerns about 's stay as GIP at [facility]. She relayed inability to financially afford to pay for an RN at home leaving only the option of him staying at [facility] as Routine LOC private pay. Following the 21 day stay under Routine she would have to take him home unless medication for the PCA pump was available. Dr. Kay Parham did make some changes in his medications today - possibility he could come off the IV push he is currently on and go home with those medications. We will have to take a day at a time and reevaluate the situation. Msw requested Dr. Danie Laguna meet with us tomorrow if she will be at [facility].

## 2018-05-31 NOTE — TELEPHONE ENCOUNTER
Pelon Faulkner is calling stating that Dr. Raine Mccullough is not enrolled in the [facility] Program so Dr. Raine Mccullough needs to be enrolled in this program first and then new patient agreement would need to be submitted. This can all be done on the website:  [email or web address]. Please call with any questions.

## 2018-05-31 NOTE — PROGRESS NOTES
[facility] Help to Those in Need  [phone]    Patient Name: Taylor Villalta  YOB: 1960    Date of Provider Hospice Visit: 5/31/2018      Level of Care:   [x] General Inpatient (GIP)    [] Routine   [] Respite    Current Location of Care:  [] [address] [place] [address] [] [facility] [] [id number] [facility] [] [facility] [x] Hospice House [facility])    Continue GIP: Severe abdominal pain with or without po intake, currently 6-7.5/10 and is not controlled on a daily basis but for brief periods of time. Medications are not able to be given at home, high risk for decline, continues with ongoing pain, must be evaluated frequently, may need palliative sedation at some point. HOSPICE DIAGNOSES   Active Symptoms:  1. Intractable pain, abdominal    2. Nausea, with vomiting  3. Severe debility  4. Weight loss/ malnutrition  5. Abdominal distention with cramping  6. Bloody stools  7. Cool and mottled lower extremities     PLAN   1. Abdominal pain: intractable continues    1. Continue  fentanyl IV push total dose to 200mcg every 3 hours = 1600mcg (Total Fentanyl +600mcg = 2200 mcg/24hours, required breakthrough 4 x doses in the past 24 hours  2. Continue  Dexamethasone 4mg IV bid  3. Continue Protonix 40mg  4. Continue  Methadone liquid 15mg PO TID  5. Continue  dexamethasone          2. Nausea:  1. Zofran 4mg IV every 6 hours scheduled and every 4 hours as needed,  1 prn dose this morning  2. Ativan 1mg IV every 6 hours    3. ADD DEXAMETHASONE AS ABOVE    3. Goals: As per discussion on 5/29/18  Discussion this morning regarding advanced disease status. I suspect this is abdominal carcinomatosis with studding of bowel. Symptoms and nutrition as well as severe decline in function are indicators that we are not at a reversible point and further chemo would harm more than help. He agreed. Discussed focus of care is comfort which he wants. He asked what we would do if we just can't get symptoms under control and we discussed palliative sedation. He was relieved to know that this is an option with severe intractable suffering. We discussed if he had a preference re site of natural death and he will think about this; right now current regimen cannot be done in the home but we would work for this if that were his preference. Also discussed resuscitation and he agreed this would not be what he would want. We signed a durable DNR and entered DNR order. He wanted to know about prognosis. We discussed few to many weeks and this is what he thought as well. This will allow him to plan for things that are important to him. As per discussion 5/30/18  Follow up discussion today with spouse  Plan is to continue GIP level of care as long as it is needed  If pt is ever more comfortable to go home we will make it happen, however very difficult situation with his pain as it is not under control. We also discussed keeping him here as this seems best for him and his family as his medications have to be given frequently and using the IV push dosing. As per discussion on 5/31/18  Examined pt with Dr Ina Ko, family present, spouse and daughters, pt is declining rapidly, less responsive this morning, had episode of bloody stools and exacerbated abdominal pain which is ongoing. Plan is to continue care at [facility]. He meets criteria for GIP again today due to extreme, intractable abdominal pain, the use iv push medications, frequent adjustments and evaluation and high risk for decline. Disposition: [facility] given intractable pain and care needs    Prognosis estimated based on 05/31/18 clinical assessment is:   [] Hours to Days    [x] Days to Weeks    [] Other:    Communicated plan of care with: Hospice Case Manager;  Hospice IDT; Care Team     GOALS OF CARE     Resuscitation Status: DNR  Durable DNR: [x] Yes [] No    Advance Care Planning 3/23/2018   Patient's Healthcare Decision Maker is: Named in scanned ACP document   Primary Decision Maker Name Chayo Allen   Primary Decision Maker Phone Number [phone]   Primary Decision Maker Relationship to Patient Spouse   Confirm Advance Directive Yes, on file   Does the patient have other document types Power of       HISTORY     History obtained from: chart, patient and wife    CHIEF COMPLAINT: abdominal pain  The patient is:   [x] Verbal  [] Nonverbal  [] Unresponsive    HPI/SUBJECTIVE:    Patient with above mentioned history.      Severe pain in abdomen  Diffuse, not generalized  Medications only covering it some but not truly relieving it  Tolerable pain level is 4-5 but he has been at 6-7.5/10  Fatigue, weakness     REVIEW OF SYSTEMS     The following systems were: [x] reviewed  [] unable to be reviewed    Positive ROS include:   Constitutional: fatigue, weakness, in pain  Ears/nose/mouth/throat:   Respiratory:   Gastrointestinal:poor appetite, nausea, vomiting, abdominal pain  Musculoskeletal:leg swelling but no difference/worse  Neurologic:  Psychiatric:  Endocrine:     Adult Non-Verbal Pain Assessment Score: N/A    Face  [] 0   No particular expression or smile  [] 1   Occasional grimace, tearing, frowning, wrinkled forehead  [] 2   Frequent grimace, tearing, frowning, wrinkled forehead    Activity (movement)  [] 0   Lying quietly, normal position  [] 1   Seeking attention through movement or slow, cautious movement  [] 2   Restless, excessive activity and/or withdrawal reflexes    Guarding  [] 0   Lying quietly, no positioning of hands over areas of body  [] 1   Splinting areas of the body, tense  [] 2   Rigid, stiff    Physiology (vital signs)  [] 0   Stable vital signs  [] 1   Change in any of the following: SBP > 20mm Hg; HR > 20/minute  [] 2   Change in any of the following: SBP > 30mm Hg; HR > 25/minute    Respiratory  [] 0   Baseline RR/SpO2, compliant with ventilator  [] 1   RR > 10 above baseline, or 5% drop SpO2, mild asynchrony with ventilator  [] 2   RR > 20 above baseline, or 10% drop SpO2, asynchrony with ventilator     FUNCTIONAL ASSESSMENT     Palliative Performance Scale (PPS): 20-30%     PSYCHOSOCIAL/SPIRITUAL ASSESSMENT     Active Problems:    Intractable abdominal pain (5/26/2018)      Anxiety about health (5/26/2018)      Anorexia (5/26/2018)      Esophageal cancer, stage IV ([facility] [address] (5/26/2018)      Retroperitoneal fibrosis (5/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    Allergies   Allergen Reactions    Levaquin [Levofloxacin] Nausea and Vomiting    Xanax [Alprazolam] Other (comments)     Possible delirium      Current Facility-Administered Medications   Medication Dose Route Frequency    methadone (DOLOPHINE) tablet 5 mg  5 mg Oral Q8H    acetaminophen (TYLENOL) tablet 650 mg  650 mg Oral Q4H PRN    fentaNYL citrate (PF) injection 200 mcg  200 mcg IntraVENous Q3H    dexamethasone (DECADRON) 4 mg/mL injection 4 mg  4 mg IntraVENous BID    oxyCODONE IR (ROXICODONE) tablet 60 mg  60 mg Oral Q3H PRN    pantoprazole (PROTONIX) tablet 40 mg  40 mg Oral DAILY    fentaNYL citrate (PF) injection 200 mcg  200 mcg IntraVENous Q1H PRN    ondansetron (ZOFRAN) injection 8 mg  8 mg IntraVENous Q4H PRN    ondansetron (ZOFRAN) injection 4 mg  4 mg IntraVENous Q6H    senna-docusate (PERICOLACE) 8.6-50 mg per tablet 2 Tab  2 Tab Oral BID    apixaban (ELIQUIS) tablet 5 mg (Patient Supplied)  5 mg Oral BID    LORazepam (ATIVAN) injection 1 mg  1 mg IntraVENous Q6H PRN        PHYSICAL EXAM     Wt Readings from Last 3 Encounters:   12/13/17 82.8 kg (182 lb 9.6 oz)   11/08/17 82.8 kg (182 lb 9.6 oz)   04/08/11 95.2 kg (209 lb 14.1 oz)       Visit Vitals    /62 (BP 1 Location: Left arm, BP Patient Position: At rest)    Pulse 100    Temp 97.5 °F (36.4 °C)    Resp 16    SpO2 98%       Supplemental O2  [] Yes  [x] NO  Last bowel movement:     Currently this patient has:  [] Peripheral IV [] PICC  [x] PORT [] ICD    [] Menjivar Catheter [] NG Tube   [] PEG Tube    [] Rectal Tube [] Drain  [] Other:  B/l nephrostomy tubes, ileostomy     Constitutional: cachectic, ill appearing, very pale  Eyes: clear  ENMT: moist, clean  Cardiovascular: reg heart rate  Respiratory: nonlabored respirations  Gastrointestinal: ileostomy bag with stool, nephrostomy tubes draining  Musculoskeletal: no deformities, edema 3+ right/2+ left, scrotal edema  Skin:dry, very pale  Neurologic:fatigue , awake, alert   Psychiatric: calm    Pertinent Lab and or Imaging Tests:  Lab Results   Component Value Date/Time    Sodium 135 05/18/2018 02:09 PM    Potassium 5.0 05/18/2018 02:09 PM    Chloride 91 (L) 05/18/2018 02:09 PM    CO2 31 (H) 05/18/2018 02:09 PM    Glucose 109 (H) 05/18/2018 02:09 PM    BUN 23 05/18/2018 02:09 PM    Creatinine 1.75 (H) 05/18/2018 02:09 PM    BUN/Creatinine ratio 13 05/18/2018 02:09 PM    GFR est AA 48 (L) 05/18/2018 02:09 PM    GFR est non-AA 42 (L) 05/18/2018 02:09 PM    Calcium 9.0 05/18/2018 02:09 PM     Lab Results   Component Value Date/Time    Protein, total 5.9 (L) 05/18/2018 02:09 PM    Albumin 3.2 (L) 05/18/2018 02:09 PM           Total time: 60 min   Counseling / coordination time: 40 min   > 50% counseling / coordination?: yes  Cassie Sanchez NP MD addendum     Patient seen and examined, reviewed medication use and current symptoms with patient's family, had extensive discussion with all family including wife and the 2 daughters to discuss patient's prognosis and care plan. Case also discussed with NP Ms. Leon Dickerson and hospice staff.   Of note patient is declining steadily, is lethargic, poorly responsive, minimal output from the nephrostomy tubes, almost no output from the ileostomy tube, possibility of bowel obstruction and renal failure. Patient continues to have pain pretty much all over the abdomen and diffuse pain in the body difficult to manage with current medications in spite of frequent adjustments in dosages and change in type of medications. Nonavailability of IV PCA opioids has contributed to the difficulty in pain management to the appropriate level. Patient requiring very close monitoring as he is frequently changing in condition and symptom intensity, requiring IV pain medications regularly, started on methadone recently the dose of which has been adjusted up. Patient continues to be GIP appropriate for hospice care due to uncontrolled pain, nausea and worsening lethargy/decreased responsiveness requiring IV medications. We will also consider starting scopolamine patch since  its anticholinergic properties might help with colicky abdominal pain and nausea. If symptoms do not resolve may also consider Haldol or even somatostatin analog octreotide for palliation of bowel obstruction related pain. We will continue to follow and support the family. Patient has palliative care physician Dr. Mary Dumas who is well-known to patient and family is also very involved this current admission as well and has been following patient regularly and assisting with management. Case was also discussed with Dr. Mary Dumas today.

## 2018-05-31 NOTE — TELEPHONE ENCOUNTER
Return  call to [address] [place] [address] [facility] , physician is enrolled but the dates need to reflect enrolment date with patient agreement

## 2018-05-31 NOTE — PROGRESS NOTES
1900: Shift report received from Veronica Espinoza, [address] [facility].  2030: Pt in bed with family at the bedside, wide and 2 daughters. Pt very drowsy but able to answers questions appropriately. Some grimacing and some moaning with some movement if upper extremities indicating discomfort. Pt states he is in pian, rated at 7/7. Appears restless. Mentions new pain in is stomach and is worried it is due to Dexamethasone that he received this morning. Pt refuses dose now, it was explained to him that pain in abdomen may be increasing due to his disease and the disease process. Lungs diminished, bowel sounds hypoactive. Medicated with PRN Fentanyl ans PRN Ativan. Refuses Dexamethasone. 2115: Medicated with scheduled meds, pt is very lethargic and not responding at this time. 2200: Pt resting with eyes closed. 2310: Pt ambulated to the bathroom with his wife and is on the commode in the bathroom. He is feeling a pressure and describes it as \"feeling like he is going to have diarrhea\". He does not like anyone on the bathroom with him. 2340: Pt back in bed, grimacing and squirming, moaning in pain. Pt asking for PRN meds. PRN Fentanyl administered. 0015: Pt resting but still shows signs of agitation/pain/discmofrt. Moving extremities, grimacing. Medicated with scheduled Fentanyl. 0145: Checking on pt, his ice bag has leaked, all linens and gown changed. Pt verbalizes pain at 6/10. Medicated with PRN Fentanyl. 0240: Pt is in the bathroom with help from his wife. 0315: Pt returned to bed, pt in a lot of pain, rated at 7.5/10. Pt feels like he is having diarrhea. Asking for Immodium. Since pt has not had any diarrhea this was not administered and explained to pt why. Medicated with PRN Ativan and Fentanyl. 8590: Medicated with scheduled meds.     NAME OF PATIENT:  Thanh Hebert    LEVEL OF CARE:  GIP    REASON FOR GIP:   Pain, despite numerous changes in medications, Medication adjustment that must be monitored 24/7 and Stabilizing treatment that cannot take place at home    *PATIENT REMAINS ELIGIBLE FOR GIP LEVEL OF CARE AS EVIDENCED BY: (MUST BE ADDRESSED OF PATIENT GIP)    O2 SAFETY:  n/a    FALL INTERVENTIONS PROVIDED:   Implemented/recommended use of non-skid footwear, Implemented/recommended use of fall risk identification flag to all team members, Implemented/recommended assistive devices and encouraged their use, Implemented/recommended resources for alarm system (personal alarm, bed alarm, call bell, etc.) , Implemented/recommended environmental changes (remove hazards, lower bed, improve lighting, etc.) and Implemented/recommended increased supervision/assistance    INTERDISPLINARY COMMUNICATION/COLLABORATION:  Physician, MSW, [place] and RN, CNA    NEW MEDICATION INITIATION DOCUMENTATION:  no changes made on night shift    COMFORTABLE DYING MEASURE:  Is Patient/family satisfied with symptom level? No. Pt still has severe pain unrelieved by scheduled meds. DISCHARGE PLAN:  Pending.

## 2018-05-31 NOTE — PROGRESS NOTES
[facility]OSP[facility] SW/CH VISIT     [facility] Hospice Chaplain follow-up Visit with family     This AM,I visited the room of this patient who is @ [facility] on GIP status. He was in bed, his eyes closed and did not respond to vocal stimulation. He was not agitated nor restless. His wife, Yancy Contreras and his two daughters were with him. I introduced myself to Yancy Contreras and her daughters, discussed my role within the care team as , and offered supplemental spiritual support also that being provided by [employer] their home Jew. Yancy Contreras was appreciative for my visit and offer. She noted that in addition to the [address] from [employer] her brother currently was currently service as the Perla of the [employer] [facility]. She thanked me for my offer. I spoke of self care and coping mechanisms with the family and stressed the need to take breaks. Yancy Contreras said her  Nilda Mock a bad night\" , experiencing significant pain beginning about 11. It apparently took time to return him to a level of comfort desired. During my visit, her was unresponsive and calm. Dr Vianeny Mcwilliams entered the room on rounds and I excused myself. This morning, I provided a ministry of presence, active listening to bakari and the daughters of the patient,, offered words of comfort, assurance, supplemental spiritual encouragement, as well as offered a prayer. Yancy Contreras asked me to visit whenever I could. I also gave her my contact information and indicated she could reach me if needed or desired. GOALS:  Continue to visit this patient and his family and  provide pastoral care and supplemental spiritual support to assist both the patient and family to better cope with both the pain and this decline. Build trust and familiarity with the patient and the family to encourage a discussion of their spiritual thoughts and concerns at a deeper and more personal level if they so choose.    Validate the emotions and normalize the anticipatory grief of the family regarding this declining situation. Coordinate with his local [employer] if so requested. PLAN:   Continue to visit this patient and his family, while he is @ [facility] and I am in this facility, or PRN as requested. Coordinate with/ Bryan Cheatham and assume  Responsibilities for this patient/family. Coordinate with Care Team on POC.  VISIT FREQUENCY:   1 wk. 2 starting 5/30 plus 4 prn 14 days.    Estelle Murrieta, [address] [facility] [address]  [phone]

## 2018-05-31 NOTE — PROGRESS NOTES
0700:  Verbal shift change report given to Hilda Echeverria RN (oncoming nurse) by Aislinn Bahena RN (offgoing nurse). Report included the following information SBAR, Kardex, Intake/Output and MAR.   0830:  Keven Yan CNA, reported that pt is having pain & nausea. Entered room; pt grimacing/moaning. Asked pt if he was nauseous and he nodded yes. Administered prn zofran 8mg/IV and fentanyl 200mcg/IV. Will continue to monitor. 0900:  Reassessed pain & nausea; pt sleeping. Tried to wake pt to give PO medications; was unable to arouse pt; held medications. 1010:  Family called; pt in pain. Entered room; pt moaning/grimacing and moving head back and forth. Administered scheduled fentanyl and prn lorazepam.   1130:  Reassessed pain & agitation; pt sleeping; no s/s of pain nor agitation. Administered scheduled medications. 1230:  Rounded; pt sleeping; family present. 1330:  Administered scheduled methadone. Pt grimacing/moaning. Administered prn fentanyl 200mcg/IV. 1445:  Rounded; pt showing s/s of agitation (moving head back and forth). Administered prn lorazepam 1mg/IV. 1530:  Administered scheduled fentanyl; no s/s of distress. 1600:  Family called; pt in pain (grimacing/moaning); administered prn fentanyl. 1705:  Reassessed pain; pt still moaning/grimacing; administered prn fentanyl. 1800:  Administered scheduled medications;  present along with family; no s/s of distress. NAME OF PATIENT:  Adam Olea    LEVEL OF CARE:  GIP    REASON FOR GIP:   Pain, despite numerous changes in medications, Nausea and vomiting, despite changes to medications, Medication adjustment that must be monitored 24/7 and Stabilizing treatment that cannot take place at home    *PATIENT REMAINS ELIGIBLE FOR GIP LEVEL OF CARE AS EVIDENCED BY: (MUST BE ADDRESSED OF PATIENT GIP) Pt receiving scheduled & prn pain and nausea medication. Pt is now having blood in stool. Will contact MD for orders.       REASON FOR RESPITE:  n/a    O2 SAFETY:  n/a    FALL INTERVENTIONS PROVIDED:   Implemented/recommended use of non-skid footwear, Implemented/recommended use of fall risk identification flag to all team members, Implemented/recommended resources for alarm system (personal alarm, bed alarm, call bell, etc.) , Implemented/recommended environmental changes (remove hazards, lower bed, improve lighting, etc.) and Implemented/recommended increased supervision/assistance    INTERDISPLINARY COMMUNICATION/COLLABORATION:  Physician, MSW, [place] and RN, CNA    NEW MEDICATION INITIATION DOCUMENTATION:  n/a    Reason medication is being initiated:  n/a    MD / Provider name consulted re: change in status / initiation of new medication:  n/a    New Symptom(s):  n/a    New Order(s):  n/a    Name of the person notified of the changes:  n/a    Name of person being taught:  n/a    Instructions given:  n/a    Side Effects taught:  n/a    Response to teaching:  n/a      COMFORTABLE DYING MEASURE:  Is Patient/family satisfied with symptom level?  yes    DISCHARGE PLAN:  Once unmanaged symptoms are managed, MSW will work with family on discharge plans.

## 2018-06-01 NOTE — PROGRESS NOTES
[facility] Help to Those in Need  [phone]    Patient Name: Verona Mcgee  YOB: 1960    Date of Provider Hospice Visit: 6/1/2018      Level of Care:   [x] General Inpatient (GIP)    [] Routine   [] Respite    Current Location of Care:  [] [facility] [place] [] [facility] [] [facility] [] [facility] [x] Hospice [facility])    Continue GIP: Severe abdominal pain with or without po intake, currently 7.5/10 and is not controlled on a daily basis. Confirmed w/ [facility] that they cannot deliver PCA here. Confirmed w/ [facility] that they cannot supply PCA here or at home given national shortage. Attempts will be made to acquire PCA from another Vendor outside of the [facility] system. Medications are not able to be given at home, high risk for decline, continues with ongoing pain, must be evaluated frequently, may need palliative sedation. HOSPICE DIAGNOSES   Active Symptoms:  1. Intractable pain, abdominal    2. Nausea, with vomiting  3. Severe debility  4. Weight loss/ malnutrition  5. Sedation side effects  6. Abdominal distention with cramping     PLAN   1. Abdominal pain: intractable    1. Increased fentanyl IV push total dose to 200mcg every 3 hours = 1600mcg (Total Fentanyl +600mcg = 2200 mcg/24hours, required breakthrough 4 x, ordered fentanyl for continuous dosing via CADD Prism pump, will start with 120 mcg/hr and will continue to utilize the PRN dosing 200mcg  Every 1 hour as needed  2. Continue  Dexamethasone 4mg IV bid  3. Continue Protonix 40mg  4. Continue  Methadone liquid     5. Continue  dexamethasone          2. Nausea:  1. Zofran 4mg IV every 6 hours, 1 dose this morning  2. Ativan 1mg IV every 6 hours    3. ADD DEXAMETHASONE AS ABOVE    3. Goals: As per discussion on 5/29/18  Discussion this morning regarding advanced disease status. I suspect this is abdominal carcinomatosis with studding of bowel.  Symptoms and nutrition as well as severe decline in function are indicators that we are not at a reversible point and further chemo would harm more than help. He agreed. Discussed focus of care is comfort which he wants. He asked what we would do if we just can't get symptoms under control and we discussed palliative sedation. He was relieved to know that this is an option with severe intractable suffering. We discussed if he had a preference re site of natural death and he will think about this; right now current regimen cannot be done in the home but we would work for this if that were his preference. Also discussed resuscitation and he agreed this would not be what he would want. We signed a durable DNR and entered DNR order. He wanted to know about prognosis. We discussed few to many weeks and this is what he thought as well. This will allow him to plan for things that are important to him. As per discussion 5/30/18  Follow up discussion today with spouse  Plan is to continue GIP level of care as long as it is needed  If pt is ever more comfortable to go home we will make it happen, however very difficult situation with his pain as it is not under control. We also discussed keeping him here as this seems best for him and his family as his medications have to be given frequently and using the IV push dosing. As per discussion on 6/1/18  Followed up with family, offered support and instructed them on the use of the IV fentanyl for continuous dosing which was ordered from [facility] and will be initiated soon, we will continue with bolus dosing as needed. Pt is to remain at [facility]. Disposition: [facility] given intractable pain and care needs    Prognosis estimated based on 06/01/18 clinical assessment is:   [x] Hours to Days    [] Days to Weeks    [] Other:    Communicated plan of care with: Hospice Case Manager;  Hospice IDT; Care Team     GOALS OF CARE     Resuscitation Status: DNR  Durable DNR: [x] Yes [] No    Advance Care Planning 3/23/2018   Patient's Healthcare Decision Maker is: Named in scanned ACP document   Primary Decision Maker Name Pretty    Primary Decision Maker Phone Number [phone]   Primary Decision Maker Relationship to Patient Spouse   Confirm Advance Directive Yes, on file   Does the patient have other document types Power of       HISTORY     History obtained from: chart, patient and wife    CHIEF COMPLAINT: abdominal pain  The patient is:   [x] Verbal  [] Nonverbal  [] Unresponsive    HPI/SUBJECTIVE:    Patient with above mentioned history.      Severe pain in abdomen  Diffuse, not generalized  Medications only covering it some but not truly relieving it  Tolerable pain level is 6 but he has been at 7.5-10   Fatigue, weakness     REVIEW OF SYSTEMS     The following systems were: [x] reviewed  [] unable to be reviewed    Positive ROS include:   Constitutional: fatigue, weakness, in pain  Ears/nose/mouth/throat:   Respiratory:   Gastrointestinal:poor appetite, nausea, vomiting, abdominal pain  Musculoskeletal:leg swelling but no difference/worse  Neurologic:  Psychiatric:  Endocrine:     Adult Non-Verbal Pain Assessment Score: N/A    Face  [] 0   No particular expression or smile  [] 1   Occasional grimace, tearing, frowning, wrinkled forehead  [] 2   Frequent grimace, tearing, frowning, wrinkled forehead    Activity (movement)  [] 0   Lying quietly, normal position  [] 1   Seeking attention through movement or slow, cautious movement  [] 2   Restless, excessive activity and/or withdrawal reflexes    Guarding  [] 0   Lying quietly, no positioning of hands over areas of body  [] 1   Splinting areas of the body, tense  [] 2   Rigid, stiff    Physiology (vital signs)  [] 0   Stable vital signs  [] 1   Change in any of the following: SBP > 20mm Hg; HR > 20/minute  [] 2   Change in any of the following: SBP > 30mm Hg; HR > 25/minute    Respiratory  [] 0   Baseline RR/SpO2, compliant with ventilator  [] 1   RR > 10 above baseline, or 5% drop SpO2, mild asynchrony with ventilator  [] 2   RR > 20 above baseline, or 10% drop SpO2, asynchrony with ventilator     FUNCTIONAL ASSESSMENT     Palliative Performance Scale (PPS): 20%     PSYCHOSOCIAL/SPIRITUAL ASSESSMENT     Active Problems:    Intractable abdominal pain (5/26/2018)      Anxiety about health (5/26/2018)      Anorexia (5/26/2018)      Esophageal cancer, stage IV ([facility] [address] (5/26/2018)      Retroperitoneal fibrosis (5/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    Allergies   Allergen Reactions    Levaquin [Levofloxacin] Nausea and Vomiting    Xanax [Alprazolam] Other (comments)     Possible delirium      Current Facility-Administered Medications   Medication Dose Route Frequency    scopolamine (TRANSDERM-SCOP) 1 mg over 3 days 1 Patch  1 Patch TransDERmal Q72H    methadone (DOLOPHINE) 10 mg/mL concentrated solution 15 mg  15 mg Oral Q8H    LORazepam (ATIVAN) injection 1 mg  1 mg IntraVENous Q15MIN PRN    LORazepam (ATIVAN) injection 1 mg  1 mg IntraVENous Q4H    acetaminophen (TYLENOL) tablet 650 mg  650 mg Oral Q4H PRN    fentaNYL citrate (PF) injection 200 mcg  200 mcg IntraVENous Q3H    dexamethasone (DECADRON) 4 mg/mL injection 4 mg  4 mg IntraVENous BID    oxyCODONE IR (ROXICODONE) tablet 60 mg  60 mg Oral Q3H PRN    fentaNYL citrate (PF) injection 200 mcg  200 mcg IntraVENous Q1H PRN    ondansetron (ZOFRAN) injection 8 mg  8 mg IntraVENous Q4H PRN    ondansetron (ZOFRAN) injection 4 mg  4 mg IntraVENous Q6H    apixaban (ELIQUIS) tablet 5 mg (Patient Supplied)  5 mg Oral BID        PHYSICAL EXAM     Wt Readings from Last 3 Encounters:   12/13/17 82.8 kg (182 lb 9.6 oz)   11/08/17 82.8 kg (182 lb 9.6 oz)   04/08/11 95.2 kg (209 lb 14.1 oz)       Visit Vitals    /80 (BP 1 Location: Left arm, BP Patient Position: At rest)    Pulse 98    Temp 98.2 °F (36.8 °C)    Resp 16    SpO2 94%       Supplemental O2  [] Yes  [x] NO  Last bowel movement:     Currently this patient has:  [] Peripheral IV [] PICC  [x] PORT [] ICD    [] Menjivar Catheter [] NG Tube   [] PEG Tube    [] Rectal Tube [] Drain  [] Other:  B/l nephrostomy tubes, ileostomy     Constitutional: cachectic, ill appearing, very pale  Eyes: clear  ENMT: moist, clean  Cardiovascular: reg heart rate  Respiratory: nonlabored respirations  Gastrointestinal: ileostomy bag with stool, nephrostomy tubes draining  Musculoskeletal: no deformities, edema 3+ right/2+ left, scrotal edema  Skin:dry, very pale  Neurologic:fatigue , awake, alert   Psychiatric: calm    Pertinent Lab and or Imaging Tests:  Lab Results   Component Value Date/Time    Sodium 135 05/18/2018 02:09 PM    Potassium 5.0 05/18/2018 02:09 PM    Chloride 91 (L) 05/18/2018 02:09 PM    CO2 31 (H) 05/18/2018 02:09 PM    Glucose 109 (H) 05/18/2018 02:09 PM    BUN 23 05/18/2018 02:09 PM    Creatinine 1.75 (H) 05/18/2018 02:09 PM    BUN/Creatinine ratio 13 05/18/2018 02:09 PM    GFR est AA 48 (L) 05/18/2018 02:09 PM    GFR est non-AA 42 (L) 05/18/2018 02:09 PM    Calcium 9.0 05/18/2018 02:09 PM     Lab Results   Component Value Date/Time    Protein, total 5.9 (L) 05/18/2018 02:09 PM    Albumin 3.2 (L) 05/18/2018 02:09 PM           Total time: 90 min   Counseling / coordination time: 40 min   > 50% counseling / coordination?: yes  Jean Fitzgerald NP

## 2018-06-01 NOTE — PROGRESS NOTES
Problem: Pain  Goal: *Control of Pain  Outcome: Progressing Towards Goal  Pt still has breakthrough pain, but due to his increased Fentanyl and Lorazepam he has had longer episodes of comfort.

## 2018-06-01 NOTE — PROGRESS NOTES
[facility]OS[facility] SW/CH VISIT      [facility] Hospice Chaplain follow-up Visit with family      This AM,I visited the room of this patient who is @ [facility] on GIP status. He was in bed, his eyes closed and did not respond to vocal stimulation. He was not agitated, and by the end of my visit, slightly restless.  His wife, Jose Raul Franklin and one of his daughters were with him. The family is now in dumont mode as they have accepted that the patient is likely to continue his medical/physical decline. Jose Raul Franklin was appreciative for my visit and continuing support. I again spoke of self care and coping mechanisms and stressed the need to take breaks. Jose Raul Franklin said her  \"another had a bad night\", again experiencing significant pain beginning about 11. It apparently took time to return him to a level of comfort desired. During my visit, her was unresponsive and calm. Dr Rashmi Orozco entered the room on rounds and I excused myself. This morning, I provided a ministry of presence, active listening to Jose Raul Franklin and the daughter of the patient, offered words of comfort, assurance, supplemental spiritual encouragement, as well as offered a prayer. Jose Raul Franklin asked me to visit whenever I could. I also gave her my contact information and indicated she could reach me if needed or desired. GOALS:  Continue to visit this patient and his family and  provide pastoral care and supplemental spiritual support to assist both the patient and family to better cope with both the pain and this decline. Build trust and familiarity with the patient and the family to encourage a discussion of their spiritual thoughts and concerns at a deeper and more personal level if they so choose. Validate the emotions and normalize the anticipatory grief of the family regarding this declining situation. Coordinate with his local [place] if so requested. PLAN:   Continue to visit this patient and his family, while he is @ [facility] and I am in this facility, or PRN as requested.    Coordinate with/  Zachery Duvall and assume  Responsibilities for this patient/family. Coordinate with Care Team on POC.  VISIT FREQUENCY:   1 wk. 2 starting 5/30 plus 4 prn 14 days.    Cecily Hannon, [address]  [phone]  [phone]

## 2018-06-01 NOTE — PROGRESS NOTES
0700- Received report from [address] utilizing SBAR, I/O and Kardex  Patient was admitted to the [facility] Level of care   Hospice Diagnosis- Esophageal Cancer with mets to liver  Other History includes- retroperitoneal cancer    0800 Assessment completed   Neuro- Lethargic moaning soft speech says few words  Muscular-no purposseful movement  Resp-even unlabored, lungs sounds diminished  Oxygen - room air  Cardio-Heart regular, pulses palpable, skin warm and dry, cap refill less than 3 sec, distal extremities warm   GI- Abd soft, nondistended, nontender, bowel sounds hypoactive, ileostomy in place little output   Diet - NPO  Last BM - 6/1  NPO  -bilateral nephrostomy tubes in place, right working emptied 250 cc dark nate urine  Skin/Wounds - sites around tubes clean dry intact  Edema- lower extremities 4+  Access sites- right chest port CDI  Family- wife children at bedside  0815 Scheduled ativan IVP, mouth care, pt is moaning  0900 Scheduled fentanyl and decadron via IVP with flush, pt turned and repositioned, altagracia care small BM, barrier cream to pink sacrum, pt is moaning  1100 Family asked for extra pain medication PRN fentanyl 200 mcg given at this time, pt is moaning  1230 Fentanyl, ativan and zofran scheduled doses given pt is moaning  1350 Turned and repositioned, nephro tube retaped on left  1400 Oral methadone tolerated well given slowly  1500 PRN fentanyl given for moaning family requested  1515 PRN effective  1530 Nephrostomy tube on left retaped  1600 Scheduled ativan at this time pt is resting. 1730 Pt is resting quietly  1800 Scheduled ativan, zofran and fentanyl at this time. 1900 Medications have arrived from [facility], Fentanyl gtt started per orders 120 mcg per hour, family educated on continuous infusion  2000 Report to oncoming shift, 2 prns fentanyl given today, hiccups continuing and getting worse.       NAME OF PATIENT:  Reece Ho     LEVEL OF CARE:  [facility]     REASON FOR GIP:   Pain, despite numerous changes in medications, Medication adjustment that must be monitored 24/7 and Stabilizing treatment that cannot take place at home     *PATIENT REMAINS ELIGIBLE FOR GIP LEVEL OF CARE AS EVIDENCED BY: (MUST BE ADDRESSED OF PATIENT GIP)  -Pt continues to have chronic abd pain, nausea, vomited last shift, requiring frequent prn medications     O2 SAFETY:  n/a     FALL INTERVENTIONS PROVIDED:   Implemented/recommended resources for alarm system (personal alarm, bed alarm, call bell, etc.)  and Implemented/recommended environmental changes (remove hazards, lower bed, improve lighting, etc.)     INTERDISPLINARY COMMUNICATION/COLLABORATION:  Physician, MSW, [place] and RN, CNA     NEW MEDICATION INITIATION DOCUMENTATION:  Patient started on fentanyl continuous infusion today due to frequent use of prn medications, ativan increased     COMFORTABLE DYING MEASURE:  Is Patient/family satisfied with symptom level?  yes     DISCHARGE PLAN:  Pending/end of life care

## 2018-06-01 NOTE — PROGRESS NOTES
1900: Shift report received from [facility], [facility].  1655: Wife at the bedside cleaning pt up, assisted and told wife she should take a break. CNA and RN provided pt with bed bath and changed linen, as well as brief. Nephrostomy dressings changed. Pt is grimacing and appears restless. Medicated with PRN Ativan. Pt tolerates bath ok, helps turn. After bath he appears more relaxed and was positioned for comfort. Dressings on nephrostomy sites changed. 2115: Pt medicated with scheduled meds. Breathing very shallow. Lifts head up once and grimaces slightly. 2230: Pt medicated with scheduled pain meds. Very lethargic and does not open eyes. 0005: Pt very drowsy, moaning, moving arms around and touching face, no eye opening. Medicated with scheduled meds. 0120: Pt resting with eyes closed. 0205: Pt resting.  0215: Pt resting.  0300: Pt starts vomiting brown fluids, smelling like stool. N/V came without warning, pt vomited medium amount and received PRN Zofran and PRN Ativan. Pt cleaned up.  0400: Pt appears comfortable at this time. 0500: pt resting with eyes closed. 0600: Medicated with scheduled meds. Some grimacing noted. NAME OF PATIENT:  Zakia Peres    LEVEL OF CARE:  [facility]    REASON FOR GIP:   Pain, despite numerous changes in medications, Medication adjustment that must be monitored 24/7 and Stabilizing treatment that cannot take place at home    *PATIENT REMAINS ELIGIBLE FOR GIP LEVEL OF CARE AS EVIDENCED BY: (MUST BE ADDRESSED OF PATIENT GIP)  -Pt continues to have severe breakthrough pain. Pt not always able to rate pain on numeric scale because he has increased drowsiness. Utilizing PRN meds frequently. Pt vomited brown fluids.     O2 SAFETY:  n/a    FALL INTERVENTIONS PROVIDED:   Implemented/recommended resources for alarm system (personal alarm, bed alarm, call bell, etc.)  and Implemented/recommended environmental changes (remove hazards, lower bed, improve lighting, etc.)    INTERDISPLINARY COMMUNICATION/COLLABORATION:  Physician, MSW, [place] and RN, CNA    NEW MEDICATION INITIATION DOCUMENTATION:  no changes made on night shift    COMFORTABLE DYING MEASURE:  Is Patient/family satisfied with symptom level?  yes    DISCHARGE PLAN:  Pending/end of life care

## 2018-06-01 NOTE — PROGRESS NOTES
Problem: Pain  Goal: *Control of Pain  Outcome: Progressing Towards Goal  Assessment of pain at beginning and throughout shift. Pt is receiving scheduled and prn pain medications. Will continue to monitor. Problem: Falls - Risk of  Goal: *Absence of Falls  Document Caio Fall Risk and appropriate interventions in the flowsheet.    Outcome: Progressing Towards Goal  Fall Risk Interventions:  Mobility Interventions: Bed/chair exit alarm         Medication Interventions: Bed/chair exit alarm    Elimination Interventions: Bed/chair exit alarm, Call light in reach

## 2018-06-02 NOTE — PROGRESS NOTES
0700- Received report from [address] utilizing SBAR, I/O and Kardex  Patient was admitted to the [facility] of care   Hospice Diagnosis- Esophageal Cancer with mets to liver  Other History includes- retroperitoneal cancer     0800 Assessment completed   Neuro- Lethargic moaning with all brreaths  Muscular-no purposseful movement  Resp-even  Slightly labored,  lungs sounds diminished, scattered rhonchi  Oxygen - room air  Cardio-Heart regular, pulses palpable, skin warm and dry, cap refill less than 3 sec, distal extremities warm   GI- Abd semi firm, distended, tender, bowel sounds hypoactive, ileostomy in place little output   Diet - NPO  Last BM - 6/1  NPO  -bilateral nephrostomy tubes in place, right working emptied   Skin/Wounds - sites around tubes clean dry intact  Edema- lower extremities 4+  Access sites- right chest port      0800 Scheduled ativan at this time, pt is moaning  0856 Ativan did not assist with moaning, PRN fentanyl administered at this time for moaning,  Fentanyl infusion continues at 120 mcg per hour  1013 PRN Fentanyl 200 mcg given IVP for resp distress  1025 PRN ativan 1mg given for resp distress  1030 Resp distress resp rate up 24, loud noisy breathing suctioning non effective, ativan 1mg for resp distress  1045 NP Eduardo called fentanyl increased to 175 mcg per hour   1048 PRN ativan for resp distress  1050 Ongoing education with wife and daughters on end of life signs and symptoms, wife calling in other relatives who may want to see him today. Family declined  visit. Pt brother is a Marly[employer] De La Cruz and has rendered his services   0 Family has called other family in at this time. 1115 Deep suctioned again with little or no results.   1126 Patient continues to exhibit signs of resp distress use of accessory muscles and loud resp rate 24-30 PRN ativan 4mg IVP deep suctioned again non effective  1200 All prns have been non effective, pt moaning loudly resp rate 26 drip  1230 Scheduled zofran, prn versed and one time dose morphine 5mg given non effective, pt has resp distress rate 28, loud noisy breathing  1246 PRN fentanyl 200 mcg for resp distress, pt repositioned with no improvement to loud breathing  1250 Patient turned and repositioned to promote better drainage of terminal secretions  1300 First dose versed 5mg given for light sedation education to family  1336  PRN Robinul for secretions PRN ativan for resp distress  1402 PRN fentanyl IVP with flush for resp distress   1417 Versed 5mg IVP for resp distress potential underlying pain  1500 Patient continues to exhibit signs of resp distress use of accessory muscles and loud resp rate 24-30 Ativan 4mg given as well as fentanyl 200 mcg  1630 Patient continues to exhibit signs of resp distress use of accessory muscles and loud resp rate 24-30 Versed 5mg, saturations 84% family decline use of oxygen  1700 Patient continues to exhibit signs of resp distress use of accessory muscles and loud resp rate 24-30 Phenobarbital, fentanyl and robinul given  1710 NP Eduardo rounding again adjusting medications   1739 Patient continues to exhibit signs of resp distress use of accessory muscles and loud resp rate 24-30 Versed 5mg and Fentanyl 200 mcg given  1855 Scheduled zofran, PRN fentanyl and versed given   1900 Pt has shown improvement in overall resp distress, much quieter, weaker, pale  1940 Fentanyl adjusted to 250 mcg per hr per order verified with family. 1950 Report to oncoming shift. Thorough review of all med changes.   1950 PUMP CLEARED 131 VOLUME LEFT TO COUNT, 2170 MCG INFUSED        NAME OF PATIENT:  Paul Villanueva      LEVEL OF CARE:  GIP      REASON FOR GIP:   Pain, despite numerous changes in medications, Medication adjustment that must be monitored 24/7 and Stabilizing treatment that cannot take place at home      *PATIENT REMAINS ELIGIBLE FOR GIP LEVEL OF CARE AS EVIDENCED BY: (MUST BE ADDRESSED OF PATIENT GIP) Patient started today with increased resp distress, very difficult to manage, frequent medications given every 15 to 30 minutes, NP exam x 3 this shift      O2 SAFETY:  n/a      FALL INTERVENTIONS PROVIDED:   Implemented/recommended resources for alarm system (personal alarm, bed alarm, call bell, etc.)  and Implemented/recommended environmental changes (remove hazards, lower bed, improve lighting, etc.)      INTERDISPLINARY COMMUNICATION/COLLABORATION:  Physician, MSW, [place] and RN, CNA      NEW MEDICATION INITIATION DOCUMENTATION: Fentanyl infusion increased x 2 this shift, ativan dosage and frequency increased, phenobarbital and versed started    Wife educated and verbalized understanding of all medication changes.      COMFORTABLE DYING MEASURE:  Is Patient/family satisfied with symptom level?  yes      DISCHARGE PLAN:  Pending/end of life care

## 2018-06-02 NOTE — PROGRESS NOTES
[facility] Help to Those in Need  [phone]    Patient Name: Marin Mackey  YOB: 1960    Date of Provider Hospice Visit: 6/2/2018      Level of Care:   [x] General Inpatient (GIP)    [] Routine   [] Respite    Current Location of Care:  [] [facility] [place] [] [facility] [] [facility] [] [facility] [x] H[facility])    Continue GIP: Severe abdominal pain with or without po intake, currently 7.5/10 and is not controlled on a daily basis. Confirmed w/ [facility] that they cannot deliver PCA here. Confirmed w/ [facility] that they cannot supply PCA here or at home given national shortage. Attempts will be made to acquire PCA from another Vendor outside of the [employer] system. Medications are not able to be given at home, high risk for decline, continues with ongoing pain, must be evaluated frequently, may need palliative sedation. HOSPICE DIAGNOSES   Active Symptoms:  1. Intractable pain, abdominal    2. Nausea, with vomiting  3. Severe debility  4. Weight loss/ malnutrition  5. Sedation side effects  6. Abdominal distention with cramping     PLAN   1. Abdominal pain:  moaning and groaning intractable    1. Increased fentanyl IV push total dose to 200mcg every 3 hours = 1600 mcg   2. DCd Dexamethasone 4mg IV bid  3. DCd methadone  4. Added phenobarbital  5. Added versed              2. Nausea:  1. Zofran 4mg IV every 6 hours   2. Ativan 4 mg IV every 4 hours           3. Acute respiratory failure: Added phenobarbital and versed for moaning and groaning, use of accessory muscles, total pain  Phenobarb dose 65mg, versed 5 mg    4. Goals: As per discussion on 5/29/18  Discussion this morning regarding advanced disease status. I suspect this is abdominal carcinomatosis with studding of bowel. Symptoms and nutrition as well as severe decline in function are indicators that we are not at a reversible point and further chemo would harm more than help. He agreed.  Discussed focus of care is comfort which he wants. He asked what we would do if we just can't get symptoms under control and we discussed palliative sedation. He was relieved to know that this is an option with severe intractable suffering. We discussed if he had a preference re site of natural death and he will think about this; right now current regimen cannot be done in the home but we would work for this if that were his preference. Also discussed resuscitation and he agreed this would not be what he would want. We signed a durable DNR and entered DNR order. He wanted to know about prognosis. We discussed few to many weeks and this is what he thought as well. This will allow him to plan for things that are important to him. As per discussion 5/30/18  Follow up discussion today with spouse  Plan is to continue GIP level of care as long as it is needed  If pt is ever more comfortable to go home we will make it happen, however very difficult situation with his pain as it is not under control. We also discussed keeping him here as this seems best for him and his family as his medications have to be given frequently and using the IV push dosing. As per discussion on 6/1/18  Followed up with family, offered support and instructed them on the use of the IV fentanyl for continuous dosing which was ordered from [facility] and will be initiated soon, we will continue with bolus dosing as needed. Pt is to remain at [facility]. As per discussion today with family: 6/2/18  Pt is actively dying and has exacerbated end of life pain and moaning and groaning, discussed this with family. Started phenobarbital and versed in addition to increasing the dose of fentanyl continuous 10 175 mcgHR  6/1/18  Followed up with family again. Pt has intractable moaning and groaning with each breath, spent over 4 hours attempting to control sym[ptoms with high doses of phenobarbital, versed, lorazepam, fentanyl.  Pt started to show signs of calming down in the late afternoon to early evening. Very difficult time for family. Disposition: [facility] given intractable pain and care needs    Prognosis estimated based on 06/02/18 clinical assessment is:   [x] Hours to Days    [] Days to Weeks    [] Other:    Communicated plan of care with: Hospice Case Manager; Hospice IDT; Care Team     GOALS OF CARE     Resuscitation Status: DNR  Durable DNR: [x] Yes [] No    Advance Care Planning 3/23/2018   Patient's Healthcare Decision Maker is: Named in scanned ACP document   Primary Decision Maker Name Andra Ferguson   Primary Decision Maker Phone Number [phone]   Primary Decision Maker Relationship to Patient Spouse   Confirm Advance Directive Yes, on file   Does the patient have other document types Power of       HISTORY     History obtained from: chart, patient and wife    CHIEF COMPLAINT: abdominal pain  The patient is:   [] Verbal  [] Nonverbal  [x] Unresponsive    HPI/SUBJECTIVE:    Patient with above mentioned history.            REVIEW OF SYSTEMS     The following systems were: [x] reviewed  [] unable to be reviewed    Positive ROS include:   Constitutional: fatigue, weakness, in pain  Ears/nose/mouth/throat:   Respiratory:   Gastrointestinal:poor appetite, nausea, vomiting, abdominal pain  Musculoskeletal:leg swelling but no difference/worse  Neurologic:  Psychiatric:  Endocrine:     Adult Non-Verbal Pain Assessment Score:   8/10    Face  [] 0   No particular expression or smile  [] 1   Occasional grimace, tearing, frowning, wrinkled forehead  [x] 2   Frequent grimace, tearing, frowning, wrinkled forehead    Activity (movement)  [] 0   Lying quietly, normal position  [] 1   Seeking attention through movement or slow, cautious movement  [x] 2   Restless, excessive activity and/or withdrawal reflexes    Guarding  [] 0   Lying quietly, no positioning of hands over areas of body  [] 1   Splinting areas of the body, tense  [x] 2   Rigid, stiff    Physiology (vital signs)  [x] 0   Stable vital signs  [] 1   Change in any of the following: SBP > 20mm Hg; HR > 20/minute  [] 2   Change in any of the following: SBP > 30mm Hg; HR > 25/minute    Respiratory  [] 0   Baseline RR/SpO2, compliant with ventilator  [] 1   RR > 10 above baseline, or 5% drop SpO2, mild asynchrony with ventilator  [x] 2   RR > 20 above baseline, or 10% drop SpO2, asynchrony with ventilator     FUNCTIONAL ASSESSMENT     Palliative Performance Scale (PPS): 10%     PSYCHOSOCIAL/SPIRITUAL ASSESSMENT     Active Problems:    Intractable abdominal pain (5/26/2018)      Anxiety about health (5/26/2018)      Anorexia (5/26/2018)      Esophageal cancer, stage IV ([facility] [address] (5/26/2018)      Retroperitoneal fibrosis (5/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    Allergies   Allergen Reactions    Levaquin [Levofloxacin] Nausea and Vomiting    Xanax [Alprazolam] Other (comments)     Possible delirium      Current Facility-Administered Medications   Medication Dose Route Frequency    Fentanyl 50 mcg/ml in 200 ml NS   Continuous Infusion CONTINUOUS    PHENobarbital (LUMINAL) injection 65 mg  65 mg IntraVENous ONCE    chlorproMAZINE (THORAZINE) 25 mg in sodium chloride (NS) 25 mL ivpb  25 mg IntraVENous Q4H PRN    scopolamine (TRANSDERM-SCOP) 1 mg over 3 days 1 Patch  1 Patch TransDERmal Q72H    methadone (DOLOPHINE) 10 mg/mL concentrated solution 15 mg  15 mg Oral Q8H    LORazepam (ATIVAN) injection 1 mg  1 mg IntraVENous Q15MIN PRN    LORazepam (ATIVAN) injection 1 mg  1 mg IntraVENous Q4H    acetaminophen (TYLENOL) tablet 650 mg  650 mg Oral Q4H PRN    dexamethasone (DECADRON) 4 mg/mL injection 4 mg  4 mg IntraVENous BID    oxyCODONE IR (ROXICODONE) tablet 60 mg  60 mg Oral Q3H PRN    fentaNYL citrate (PF) injection 200 mcg  200 mcg IntraVENous Q1H PRN    ondansetron (ZOFRAN) injection 8 mg  8 mg IntraVENous Q4H PRN    ondansetron (ZOFRAN) injection 4 mg  4 mg IntraVENous Q6H        PHYSICAL EXAM     Wt Readings from Last 3 Encounters:   12/13/17 82.8 kg (182 lb 9.6 oz)   11/08/17 82.8 kg (182 lb 9.6 oz)   04/08/11 95.2 kg (209 lb 14.1 oz)       Visit Vitals    BP 90/50 (BP 1 Location: Right arm, BP Patient Position: At rest)    Pulse 90    Temp 98.8 °F (37.1 °C)    Resp 16    SpO2 (!) 84%       Supplemental O2  [] Yes  [x] NO  Last bowel movement:     Currently this patient has:  [] Peripheral IV [] PICC  [x] PORT [] ICD    [] Menjivar Catheter [] NG Tube   [] PEG Tube    [] Rectal Tube [] Drain  [] Other:  B/l nephrostomy tubes, ileostomy     Constitutional: cachectic, ill appearing, very pale  Eyes: clear  ENMT: moist, clean  Cardiovascular: reg heart rate  Respiratory: nonlabored respirations  Gastrointestinal: ileostomy bag with stool, nephrostomy tubes draining  Musculoskeletal: no deformities, edema 3+ right/2+ left, scrotal edema  Skin:dry, very pale  Neurologic: fatigue   Psychiatric: moaning and groaning all day    Pertinent Lab and or Imaging Tests:  Lab Results   Component Value Date/Time    Sodium 135 05/18/2018 02:09 PM    Potassium 5.0 05/18/2018 02:09 PM    Chloride 91 (L) 05/18/2018 02:09 PM    CO2 31 (H) 05/18/2018 02:09 PM    Glucose 109 (H) 05/18/2018 02:09 PM    BUN 23 05/18/2018 02:09 PM    Creatinine 1.75 (H) 05/18/2018 02:09 PM    BUN/Creatinine ratio 13 05/18/2018 02:09 PM    GFR est AA 48 (L) 05/18/2018 02:09 PM    GFR est non-AA 42 (L) 05/18/2018 02:09 PM    Calcium 9.0 05/18/2018 02:09 PM     Lab Results   Component Value Date/Time    Protein, total 5.9 (L) 05/18/2018 02:09 PM    Albumin 3.2 (L) 05/18/2018 02:09 PM           Total time: 240  min   Counseling / coordination time: 120 min   > 50% counseling / coordination?: yes  Cassie Sanchez NP

## 2018-06-02 NOTE — HOSPICE
On call MSW made prn visit on pt who remains at [facility] level of care. Pt very symptomatic; nearing end of life. On call NP assessed pt and made medication adjustments to address uncontrolled symptoms and educated family on pts status - nearing end of life. NP also requested MSWs assistance with a legacy project for pts family. Many family members sitting dumont in pts room; tearful and supporting one another. MSW assisted family with a legacy project; much satisfaction and gratitude expressed by family.

## 2018-06-02 NOTE — PROGRESS NOTES
1900 Report received from Magda Lloyd, [address] [facility]. Pt Is GIP level of care. Dx Gastro esophageal Cancer with mets. 1 Wife daughters and other family members around the bedside. Pt is not responsive verbally. Makes slight movements of hands. Sometimes seems to respond with moan to voices in the room. , moving arms. Fentanyl via Cadd infusing at 120 mcg per hour continuos infusion. 2030 Family st the bedside. Pt appears to be sleeping. 2100 Pt has facial grimace and moaning. Wife reports that the pt awakened enough to tell her he loves her. He was able to take a few sips of water for me with a straw. . Medicated with Fentanyl 200mcg prn dose for signs of discomfort. 2215 Pt has hiccups that is causing facial grimace and rythmnic moaning. Order obtained for Thorazine IV.  2315 Thorazine 25 mg given IV push in 25ml NS over 25 mins. 0000 Pt still has periods of hiccoughs but lesser. Appears to be sleeping.  0100 Family sleeping at the bedside. Pt is sleeping. Respirations slow and shallow. 0200 Pt repositioned in bed to left side. Wife states pt moaning and has periods of hiccoughs. Small amt brown drainage from rectum. Area cleansed and barrier cream applied. 0300 Wife states pt is moaning and brow is frowned. Fentanyl PRN dose given IV and scheduled Lorazepam.  0400 Pt resting quietly. Appears to be comfortable. 0500 Pt appears to be sleeping. No s/s of pain. Resp rate slow at 12 and shallow. 0 Wife call out to state pt is moaning and trying to cough. Has moaning and hiccoughs. Does not respond to stimuli. Wife is at the bedside. PRN Fentanyl and Lorazepam doses given for symptoms. 0545 Pt has received 1257.5 mcg of Fentanyl via PCA since 7pm with 600 mcg in a addition PRN. ( total 1857.5 mcg)  He has had 1 prn dose of Lorazepam in addition to the scheduled.         NAME OF PATIENT:  William Multani    LEVEL OF CARE:  GIP    REASON FOR GIP:   Pain, despite numerous changes in medications, Medication adjustment that must be monitored 24/7 and Stabilizing treatment that cannot take place at home    *PATIENT REMAINS ELIGIBLE FOR GIP LEVEL OF CARE AS EVIDENCED BY: (MUST BE ADDRESSED OF PATIENT GIP) frequent assessment and medication changes and adjustments required to manage symptoms      REASON FOR RESPITE:  na    O2 SAFETY:  na    FALL INTERVENTIONS PROVIDED:   Implemented/recommended use of fall risk identification flag to all team members, Implemented/recommended resources for alarm system (personal alarm, bed alarm, call bell, etc.) , Implemented/recommended environmental changes (remove hazards, lower bed, improve lighting, etc.) and Implemented/recommended increased supervision/assistance    INTERDISPLINARY COMMUNICATION/COLLABORATION:  Physician, MSW, Zari and RN, CNA    NEW MEDICATION INITIATION DOCUMENTATION:  Obtained Order from Provider for initiation of symptom relief medication /other medication needed and Documentation completed in Clinical Note in [facility] Care   Thorazine ordered for hiccoughs    Reason medication is being initiated:  Manage hiccough    MD / Provider name consulted re: change in status / initiation of new medication:  NP Alyssa Kline Symptom(s):  hiccoughs    New Order(s):  Thorazine 25mg  Every 4 hours prn    Name of the person notified of the changes:  Pts wife Justo Rosario  Name of person being taught:  Justo Rosario  Instructions given:  Action and purpose of medication    Side Effects taught:  Increased drowsiness    Response to teaching: voices understanding      COMFORTABLE DYING MEASURE:  Is Patient/family satisfied with symptom level? Yes    DISCHARGE PLAN:  Pt is transitioning. Plans for EOL at the [facility].

## 2018-06-02 NOTE — PROGRESS NOTES
Problem: Pressure Injury - Risk of  Goal: *Prevention of pressure injury  Document Antonio Scale and appropriate interventions in the flowsheet. Outcome: Progressing Towards Goal  Pressure Injury Interventions:  Sensory Interventions: Assess changes in LOC, Check visual cues for pain, Float heels, Keep linens dry and wrinkle-free, Maintain/enhance activity level, Minimize linen layers, Monitor skin under medical devices, Pad between skin to skin, Pressure redistribution bed/mattress (bed type), Turn and reposition approx.  every two hours (pillows and wedges if needed)    Moisture Interventions: Absorbent underpads, Apply protective barrier, creams and emollients, Minimize layers    Activity Interventions: Pressure redistribution bed/mattress(bed type)    Mobility Interventions: HOB 30 degrees or less    Nutrition Interventions: Document food/fluid/supplement intake    Friction and Shear Interventions: Apply protective barrier, creams and emollients, Foam dressings/transparent film/skin sealants, HOB 30 degrees or less, Lift sheet

## 2018-06-02 NOTE — PROGRESS NOTES
8677 Family states pt has hiccups causing him to moan and grimace. Phone call to ALEX Moscoso to request med for hiccups. Thorazine ordered.

## 2018-06-03 NOTE — PROGRESS NOTES
0700- Received report from [employer], [employer] and [employer]  Patient was admitted to the [address] [facility] [place]   Hospice Diagnosis- Esophageal Cancer with mets to liver  Other History includes- retroperitoneal cancer      0800 Assessment completed   Neuro- Lethargic  Muscular-no purposseful movement  Resp-shallow resp, very diminished  Oxygen - room air  Cardio-Heart regular, pulses palpable, skin warm and dry, cap refill less than 3 sec, distal extremities cool   GI- Abd semi firm, distended, tender, bowel sounds hypoactive, ileostomy in place little output   Diet - NPO  Last BM - 6/2  NPO  -bilateral nephrostomy tubes in place, right working emptied   Skin/Wounds - sites around tubes clean dry intact  Edema- lower extremities 4+  Access sites- right chest port, continuous infusion of fentanyl 250 mcg verified infusing through port      0800 Rounding pt is unresponsive, shallow breathing comfortable, unable to detect BP.  1100  Pt is unresposive, breathing is shallow, loud breathing has ceased, no purposeful movement, unresponsive, scheduled ativan at this time, will give versed due at this time next hour as not to over sedate  1200 Versed 5mg given IVP resp 12, unresponsive  1255 Called to room by wife, states concern that I split medications at 1100 however did not disagree at that  time I explained to her why I was doing this. Daughters feel that pt needs both sedative meds at same time. I explained my rationale but stated I would give medication next time together. I reminded them also that we have prn medications that pt can have in between his scheduled medications. They were not aware of this. Wife expressed concern for some slight moaning that she believes was caused by my spliting the sedative medication  Pt is moderately sedated at this time with no moaning. I told wife that he may be moaning because his dose of phenobarbital is due now amd Im in here talking.  I didn't mean it the way if came out but she was clearly offended and upset with me. I apologized several times and assured her that I would give the medications like she asked. Wife asked CNA to call NP Grant Bernheim. NP Grant Bernheim called and ordered for meds to be given together. 1310 Scheduled phenobarbital 65 mg given with flush. 1500 Pt remains unresponsive no signs of distress, pt requiring scheduled meds for control of symptoms. Ativan and versed given together. New scop patch applied. Again apologized to family about misunderstanding. 1700 Family complained of some slight moaning requested fentanyl prn dose which was given. 1900 NEW Fentanyl bag administered, battery changed, administered scheduled Versed, Ativan and Phenobarbital. Migel Molina RN at bedside to verify reviewed bedside report with family reassuring that we are aware meds to be given together.  Pt is unresponsive with shallow breathing.     NAME OF PATIENT: Raghav Villanueva      LEVEL OF CARE:  GIP      REASON FOR GIP:   Pain, despite numerous changes in medications, Medication adjustment that must be monitored 24/7 and Stabilizing treatment that cannot take place at home      *PATIENT REMAINS ELIGIBLE FOR [facility] LEVEL OF CARE AS EVIDENCED BY: (MUST BE ADDRESSED OF PATIENT GIP) management of EOL symptoms, resp distress, and pain, requires reg IV scheduled med to control symptoms as well as continuous infusion of fentanyl than was increased three times past 24 hrs, patient is also receiving continuous intravenous infusion of fentanyl to control symptoms      O2 SAFETY:  n/a      FALL INTERVENTIONS PROVIDED:   Implemented/recommended resources for alarm system (personal alarm, bed alarm, call bell, etc.)  and Implemented/recommended environmental changes (remove hazards, lower bed, improve lighting, etc.)      INTERDISPLINARY COMMUNICATION/COLLABORATION:  Physician, MSW, [place] and RN, CNA      NEW MEDICATION INITIATION DOCUMENTATION: No new meds this shift, pt continues to require scheduled medications and prns for management of pain, and resp distress         COMFORTABLE DYING MEASURE:  Is Patient/family satisfied with symptom level?  yes      DISCHARGE PLAN:  Pending/end of life care

## 2018-06-03 NOTE — PROGRESS NOTES
1900 Report received from [place]. Pt is GIP level of care. Dx GasricEsophageal Cancer with mets. 1930 Family around the bedside. Pt is unresponsive to verbal or tactile stimuli. Respiratory ate is slow at 12 and shallow. Coarse lung sounds. Lips are pale, extremities are warm. BP 62/42. Apical heart rate 120 and thready. Unable to get O2 Sat reading. Spoke with wife and answered her questions about EOL symptoms. 2000 Scheduled medications given to manage symptoms. Robinul prn dose for secretions. Fentanyl infusion via CADD running at 250 mcg/hr. 2100 Pt appears in comfort. Family at the bedside supporting each other. 2200 Pt remains in comfort. 2300 Scheduled medications required and given to manage pain. PRN dose Robinul given for increased secretions. Slight movement of body to reposition in bed for comfort. Pt made no response. 0000 Resting quietly. Pain managed with scheduled medications. 0100 Scheduled medication given. Daughter states she hears some congestion with breathing. Robinul given IV for secretions  0200 Wife states pt gives an occasional sigh but no signs of pain noted. 0300 Respirations slow and shallow, rate 16. Family rotating sleep. Scheduled medications given to manage pain  0400 Pt requires continuous scheduled medications to remain in comfort. 0500 resting quietly, skin color pale but warm. 0600 remains in comfort with scheduled medication  0700 Pt repositioned slightly with  Pillows. Colostomy bag changed to help odor, pt washed partially ,mouth care given. Pt moans softly with movement but calmed with rest.      NAME OF PATIENT:  Deon Rosa    LEVEL OF CARE:  GIP    REASON FOR GIP:   Pain, despite numerous changes in medications, Medication adjustment that must be monitored 24/7 and Stabilizing treatment that cannot take place at home    *PATIENT REMAINS ELIGIBLE FOR GIP LEVEL OF CARE AS EVIDENCED BY: (MUST BE ADDRESSED OF PATIENT GIP)  Frequent assessment and medication dose and meds required to manage symptoms. REASON FOR RESPITE:  na    O2 SAFETY:  na    FALL INTERVENTIONS PROVIDED:   Implemented/recommended resources for alarm system (personal alarm, bed alarm, call bell, etc.) , Implemented/recommended environmental changes (remove hazards, lower bed, improve lighting, etc.) and Implemented/recommended increased supervision/assistance    INTERDISPLINARY COMMUNICATION/COLLABORATION:  Physician, MSW, Zari and RN, CNA    NEW MEDICATION INITIATION DOCUMENTATION:  na    Reason medication is being initiated:  No new meds at this time. Many changes made early hours    MD / Provider name consulted re: change in status / initiation of new medication:  na    New Symptom(s):  na    New Order(s):  na    Name of the person notified of the changes:  na    Name of person being taught:  na    Instructions given:  na    Side Effects taught:  na    Response to teaching:  na    COMFORTABLE DYING MEASURE:  Is Patient/family satisfied with symptom level? Yes    DISCHARGE PLAN:   Pt is transitioning. Remain at [facility] EOL.

## 2018-06-03 NOTE — PROGRESS NOTES
[facility]  [phone]    Patient Name: Tim Diane  YOB: 1960    Date of Provider Hospice Visit: 6/3/2018      Level of Care:   [x] General Inpatient (GIP)    [] Routine   [] Respite    Current Location of Care:  [] [facility] [place] [] [facility] [] [facility] [] [facility] [x] Hospice House [facility])    Continue GIP: Severe abdominal pain with or without po intake, currently 7.5/10 and is not controlled on a daily basis. Confirmed w/ [facility] that they cannot deliver PCA here. Confirmed w/ [facility] that they cannot supply PCA here or at home given national shortage. Attempts will be made to acquire PCA from another Vendor outside of the [employer] system. Medications are not able to be given at home, high risk for decline, continues with ongoing pain, must be evaluated frequently, may need palliative sedation. HOSPICE DIAGNOSES   Active Symptoms:  1. Intractable pain, abdominal    2. Nausea, with vomiting  3. Severe debility  4. Weight loss/ malnutrition  5. Sedation side effects  6. Abdominal distention with cramping     PLAN   1. Abdominal pain:  moaning and groaning intractable    1. Increased fentanyl IV push total dose to 200mcg every 3 hours  As needed  2. DCd Dexamethasone 4mg IV bid  3. DCd methadone  4. Continue phenobarbital 65 mg every 6 hours  5. Continue versed  6. Lorazepam 4 mg IV every 4 hrs  7. Transderm scop patch, continue robinul, zofran              2. Nausea:  1. Zofran 4mg IV every 6 hours   2. Ativan 4 mg IV every 4 hours           3. Acute respiratory failure: Added phenobarbital and versed for moaning and groaning, use of accessory muscles, total pain  Phenobarb doses  65 mg scheduled every 6 hours and versed 5 mg scheduled every 4 hours, ativan 4 mg  scheduled every 4 hours, all meds must be given IV    4. Goals: As per discussion on 5/29/18  Discussion this morning regarding advanced disease status.  I suspect this is abdominal carcinomatosis with studding of bowel. Symptoms and nutrition as well as severe decline in function are indicators that we are not at a reversible point and further chemo would harm more than help. He agreed. Discussed focus of care is comfort which he wants. He asked what we would do if we just can't get symptoms under control and we discussed palliative sedation. He was relieved to know that this is an option with severe intractable suffering. We discussed if he had a preference re site of natural death and he will think about this; right now current regimen cannot be done in the home but we would work for this if that were his preference. Also discussed resuscitation and he agreed this would not be what he would want. We signed a durable DNR and entered DNR order. He wanted to know about prognosis. We discussed few to many weeks and this is what he thought as well. This will allow him to plan for things that are important to him. As per discussion 5/30/18  Follow up discussion today with spouse  Plan is to continue GIP level of care as long as it is needed  If pt is ever more comfortable to go home we will make it happen, however very difficult situation with his pain as it is not under control. We also discussed keeping him here as this seems best for him and his family as his medications have to be given frequently and using the IV push dosing. As per discussion on 6/1/18  Followed up with family, offered support and instructed them on the use of the IV fentanyl for continuous dosing which was ordered from [facility] and will be initiated soon, we will continue with bolus dosing as needed. Pt is to remain at [facility]. As per discussion today with family:   6/2/18  Pt is actively dying and has exacerbated end of life pain and moaning and groaning, discussed this with family. Started phenobarbital and versed in addition to increasing the dose of fentanyl continuous 175 mcg HR increased to 250 mcghr  Followed up with family again.  Pt has intractable moaning and groaning with each breath, spent over 4 hours attempting to control symptoms with high doses of phenobarbital, versed, lorazepam, fentanyl. Pt started to show signs of calming down in the late afternoon to early evening. Very difficult time for family. 6/3/18  Pt obtunded, actively dying high doses of phenobarbital, versed, lorazepam, fentanyl given scheduled. Prn and continuous drip, finally more comfortable this morning, family reports periodic episodes of moaning when moved or hiccups, but currently peaceful. Disposition: [facility] given intractable pain and care needs    Prognosis estimated based on 06/03/18 clinical assessment is:   [x] Hours to Days    [] Days to Weeks    [] Other:    Communicated plan of care with: Hospice Case Manager; Hospice IDT; Care Team     GOALS OF CARE     Resuscitation Status: DNR  Durable DNR: [x] Yes [] No    Advance Care Planning 3/23/2018   Patient's Healthcare Decision Maker is: Named in scanned ACP document   Primary Decision Maker Name Andra Ferguson   Primary Decision Maker Phone Number [phone]   Primary Decision Maker Relationship to Patient Spouse   Confirm Advance Directive Yes, on file   Does the patient have other document types Power of       HISTORY     History obtained from: chart, patient and wife    CHIEF COMPLAINT: abdominal pain  The patient is:   [] Verbal  [] Nonverbal  [x] Unresponsive    HPI/SUBJECTIVE:    Patient with above mentioned history.            REVIEW OF SYSTEMS     The following systems were: [x] reviewed  [] unable to be reviewed    Positive ROS include:   Constitutional: fatigue, weakness, in pain  Ears/nose/mouth/throat:   Respiratory:   Gastrointestinal:poor appetite, nausea, vomiting, abdominal pain  Musculoskeletal:leg swelling but no difference/worse  Neurologic:  Psychiatric:  Endocrine:     Adult Non-Verbal Pain Assessment Score:   4/10    Face  [x] 0   No particular expression or smile  [] 1 Occasional grimace, tearing, frowning, wrinkled forehead  [] 2   Frequent grimace, tearing, frowning, wrinkled forehead    Activity (movement)  [x] 0   Lying quietly, normal position  [] 1   Seeking attention through movement or slow, cautious movement  [] 2   Restless, excessive activity and/or withdrawal reflexes    Guarding  [x] 0   Lying quietly, no positioning of hands over areas of body  [] 1   Splinting areas of the body, tense  [] 2   Rigid, stiff    Physiology (vital signs)  [] 0   Stable vital signs  [] 1   Change in any of the following: SBP > 20mm Hg; HR > 20/minute  [x] 2   Change in any of the following: SBP > 30mm Hg; HR > 25/minute    Respiratory  [] 0   Baseline RR/SpO2, compliant with ventilator  [] 1   RR > 10 above baseline, or 5% drop SpO2, mild asynchrony with ventilator  [x] 2   RR > 20 above baseline, or 10% drop SpO2, asynchrony with ventilator     FUNCTIONAL ASSESSMENT     Palliative Performance Scale (PPS): 10%     PSYCHOSOCIAL/SPIRITUAL ASSESSMENT     Active Problems:    Intractable abdominal pain (5/26/2018)      Anxiety about health (5/26/2018)      Anorexia (5/26/2018)      Esophageal cancer, stage IV ([facility] [address] (5/26/2018)      Retroperitoneal fibrosis (5/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    Allergies   Allergen Reactions    Levaquin [Levofloxacin] Nausea and Vomiting    Xanax [Alprazolam] Other (comments)     Possible delirium      Current Facility-Administered Medications   Medication Dose Route Frequency    PHENobarbital (LUMINAL) injection 65 mg  65 mg IntraVENous Q4H PRN    LORazepam (ATIVAN) injection 4 mg  4 mg IntraVENous Q15MIN PRN    midazolam (VERSED) injection 5 mg (Patient Supplied)  5 mg IntraVENous Q15MIN PRN    Fentanyl 50 mcg/ml in 200 ml NS (Patient Supplied)   Continuous Infusion CONTINUOUS    glycopyrrolate (ROBINUL) injection 0.2 mg  0.2 mg IntraVENous Q2H PRN    fentaNYL citrate (PF) injection 200 mcg  200 mcg IntraVENous Q30MIN PRN    LORazepam (ATIVAN) injection 4 mg  4 mg IntraVENous Q4H    midazolam (VERSED) injection 5 mg (Patient Supplied)  5 mg IntraVENous Q4H    PHENobarbital (LUMINAL) injection 65 mg  65 mg IntraVENous Q6H    chlorproMAZINE (THORAZINE) 25 mg in sodium chloride (NS) 25 mL ivpb  25 mg IntraVENous Q4H PRN    scopolamine (TRANSDERM-SCOP) 1 mg over 3 days 1 Patch  1 Patch TransDERmal Q72H    acetaminophen (TYLENOL) tablet 650 mg  650 mg Oral Q4H PRN    ondansetron (ZOFRAN) injection 8 mg  8 mg IntraVENous Q4H PRN        PHYSICAL EXAM     Wt Readings from Last 3 Encounters:   12/13/17 82.8 kg (182 lb 9.6 oz)   11/08/17 82.8 kg (182 lb 9.6 oz)   04/08/11 95.2 kg (209 lb 14.1 oz)       Visit Vitals    BP (!) 80/46 (BP 1 Location: Right arm, BP Patient Position: At rest)    Pulse (!) 104    Temp 98.7 °F (37.1 °C)    Resp 16    SpO2 (!) 74%       Supplemental O2  [] Yes  [x] NO  Last bowel movement:     Currently this patient has:  [] Peripheral IV [] PICC  [x] PORT [] ICD    [] Menjivar Catheter [] NG Tube   [] PEG Tube    [] Rectal Tube [] Drain  [] Other:  B/l nephrostomy tubes, ileostomy     Constitutional: cachectic, ill appearing, very pale  Eyes: clear  ENMT: moist, clean  Cardiovascular: reg heart rate  Respiratory: nonlabored respirations  Gastrointestinal: ileostomy bag with stool, nephrostomy tubes draining  Musculoskeletal: no deformities, edema 3+ right/2+ left, scrotal edema  Skin:dry, very pale  Neurologic: fatigue   Psychiatric: moaning and groaning all day    Pertinent Lab and or Imaging Tests:  Lab Results   Component Value Date/Time    Sodium 135 05/18/2018 02:09 PM    Potassium 5.0 05/18/2018 02:09 PM    Chloride 91 (L) 05/18/2018 02:09 PM    CO2 31 (H) 05/18/2018 02:09 PM    Glucose 109 (H) 05/18/2018 02:09 PM    BUN 23 05/18/2018 02:09 PM    Creatinine 1.75 (H) 05/18/2018 02:09 PM    BUN/Creatinine ratio 13 05/18/2018 02:09 PM    GFR est AA 48 (L) 05/18/2018 02:09 PM    GFR est non-AA 42 (L) 05/18/2018 02:09 PM    Calcium 9.0 05/18/2018 02:09 PM     Lab Results   Component Value Date/Time    Protein, total 5.9 (L) 05/18/2018 02:09 PM    Albumin 3.2 (L) 05/18/2018 02:09 PM           Total time: 45  min   Counseling / coordination time: 25 min   > 50% counseling / coordination?: yes  Fernanda Liu NP

## 2018-06-04 NOTE — PROGRESS NOTES
1900 Report received from [facility]. Pt is GIP level of care. Dx Gastroesophageal Cancer with mets. 1 Wife and daughters at the bedside. Fentanyl via continuous CADD pump infusion at 250 mcg/hr. Pt is unresponsive to verbal or tactile stimuli. 2010 V/S taken. Pts respiratory rate is 16 and even but shallow. Family reports a few 10 second periods of apnea throughout the day. No output in Colostomy bag. Bilateral Nephrostomy tubes. Skin is warm and dry to touch. Color is pale, lips are white. No mottling noted. 2115 Family at the bedside. Pt appears to be resting. No signs of discomfort. Scheduled meds effective to maintain comfort. 2230 Pt is moaning with each respiration. Daughter states it has been for about 5 minutes. PRN dose of Fentanyl given for signs of discomfort, Scheduled medications required and given to maintain comfort. 2300 Pt no longer moaning. PRN Medication effective in managing signs of pain. 0000 Pt appears to be sleeping. No signs of discomfort. 0100 Scheduled Phenobarb given. Pt is resting quietly. 0200 Pt resting. Current medication required to manage symptoms. 0300 Pt is resting quietly. Scheduled medications given. 0400 Medication effective. Pt appears to be sleeping  0545 Pt given personal care, gown and linen changed. Pt has no output from Ileostomy but had large amt bloody soft stool from rectum. Very foul odor. Sacral area purple in color with stage 1, barrier cream applied. 8637 Scheduled medications given for management of symptoms.     NAME OF PATIENT:  Nilesh Coronado    LEVEL OF CARE: GIP    REASON FOR GIP:   Pain, despite numerous changes in medications, Medication adjustment that must be monitored 24/7 and Stabilizing treatment that cannot take place at home    *PATIENT REMAINS ELIGIBLE FOR GIP LEVEL OF CARE AS EVIDENCED BY: (MUST BE ADDRESSED OF PATIENT GIP) frequent assessment and medication changes and adjustments in doses required to manage symptoms and provide comfort. REASON FOR RESPITE:  na    O2 SAFETY:  na    FALL INTERVENTIONS PROVIDED:   Implemented/recommended resources for alarm system (personal alarm, bed alarm, call bell, etc.) , Implemented/recommended environmental changes (remove hazards, lower bed, improve lighting, etc.) and Implemented/recommended increased supervision/assistance    INTERDISPLINARY COMMUNICATION/COLLABORATION:  Physician, MSW, [place] and RN, CNA    NEW MEDICATION INITIATION DOCUMENTATION:  no new meds at this time    Reason medication is being initiated:  na    MD / Provider name consulted re: change in status / initiation of new medication:  na  New Symptom(s):  na    New Order(s):  na    Name of the person notified of the changes:  na    Name of person being taught:  na    Instructions given:  na    Side Effects taught:  na    Response to teaching:  na      COMFORTABLE DYING MEASURE:  Is Patient/family satisfied with symptom level   Yes    DISCHARGE PLAN:  Pt is transitioning.  Plans are to remain at [facility]

## 2018-06-04 NOTE — PROGRESS NOTES
0700:  Report received. 8867:  Rounded; pt comfortable (no s/s of pain nor distress); family present. 8888:  Assessed patient and check CADD pump; unresponsive; BP 88/50; HR 88/regular; O2 at 79%/RA/regular/unlabored; skin intact; no s/s of pain nor distress; family present. 1010:  Rounded; no s/s of pain nor distress; family present. 1120:  Rounded; no s/s of pain nor distress; family present. 1200:  Administered scheduled medications; no s/s of pain nor distress; family present; pt started to moan. According to family, he has been doing this off and on. Will continue to monitor. 1315:  Rounded; pt showing no s/s of distress; appears comfortable; family present. 1420:  Administered scheduled medication; checked pt's posterior side (back), leakage from right nephrostomy. Changed dressing, linens, and gown. 1540:  Administered scheduled medications; pt comfortable; family present    NAME OF PATIENT:  Gabi Gu    LEVEL OF CARE:  [facility]    REASON FOR GIP:   Pain, despite numerous changes in medications, Medication adjustment that must be monitored 24/7 and Stabilizing treatment that cannot take place at home    *PATIENT REMAINS ELIGIBLE FOR GIP LEVEL OF CARE AS EVIDENCED BY: (MUST BE ADDRESSED OF PATIENT GIP) Pt continues to have pain requiring frequent adjustments in medications.       REASON FOR RESPITE:  n/a    O2 SAFETY:  n/a    FALL INTERVENTIONS PROVIDED:   Implemented/recommended resources for alarm system (personal alarm, bed alarm, call bell, etc.) , Implemented/recommended environmental changes (remove hazards, lower bed, improve lighting, etc.) and Implemented/recommended increased supervision/assistance    INTERDISPLINARY COMMUNICATION/COLLABORATION:  Physician, MSW, Zari and RN, CNA    NEW MEDICATION INITIATION DOCUMENTATION:  n/a    Reason medication is being initiated:  n/a    MD / Provider name consulted re: change in status / initiation of new medication:  n/a    New Symptom(s): n/a    New Order(s):  n/a    Name of the person notified of the changes:  n/a    Name of person being taught:  n/a    Instructions given:  n/a    Side Effects taught:  n/a    Response to teaching:  n/a      COMFORTABLE DYING MEASURE:  Is Patient/family satisfied with symptom level?  yes    DISCHARGE PLAN:  MSW working with family on discharge plans or possibly  at [facility].

## 2018-06-04 NOTE — PROGRESS NOTES
[facility]  [phone]    Patient Name: Deon Rosa  YOB: 1960    Date of Provider Hospice Visit: 6/4/2018      Level of Care:   [x] General Inpatient (GIP)    [] Routine   [] Respite    Current Location of Care:  [] [facility] [] [facility] [] [facility] [] [facility] [x] Hospice [facility])    Continue GIP: Severe abdominal pain with or without po intake, currently 7.5/10 and is not controlled on a daily basis. Confirmed w/ [facility] that they cannot deliver PCA here. Confirmed w/ [facility] that they cannot supply PCA here or at home given national shortage. Attempts will be made to acquire PCA from another Vendor outside of the [facility] system. Medications are not able to be given at home, high risk for decline, continues with ongoing pain, must be evaluated frequently, may need palliative sedation. HOSPICE DIAGNOSES   Active Symptoms:  1. Intractable pain, abdominal    2. Nausea, with vomiting  3. Severe debility  4. Weight loss/ malnutrition  5. Sedation side effects  6. Abdominal distention with cramping     PLAN   1. Abdominal pain:  moaning and groaning intractable over the past 5 days, finally more quiet and comfortable. 1. Increased fentanyl IV push total dose to 200mcg every 3 hours as needed and 250mcg/hr  2. Continue phenobarbital 65 mg every 6 hours  3. Continue versed 5mg every 4 hours  4. Lorazepam 4 mg IV every 4 hrs  5. Transderm scop patch, continue robinul, zofran              2. Nausea:  1. Thorazine for hiccups 25mg every 4 hours prn, has not needed for several days  2. Ativan 4 mg IV every 4 hours           3. Acute respiratory failure: Added phenobarbital and versed for moaning and groaning, use of accessory muscles, total pain  Phenobarb doses  65 mg scheduled every 6 hours and versed 5 mg scheduled every 4 hours, ativan 4 mg  scheduled every 4 hours, all meds must be given IV    4. Goals:    As per discussion on 5/29/18  Discussion this morning regarding advanced disease status. I suspect this is abdominal carcinomatosis with studding of bowel. Symptoms and nutrition as well as severe decline in function are indicators that we are not at a reversible point and further chemo would harm more than help. He agreed. Discussed focus of care is comfort which he wants. He asked what we would do if we just can't get symptoms under control and we discussed palliative sedation. He was relieved to know that this is an option with severe intractable suffering. We discussed if he had a preference re site of natural death and he will think about this; right now current regimen cannot be done in the home but we would work for this if that were his preference. Also discussed resuscitation and he agreed this would not be what he would want. We signed a durable DNR and entered DNR order. He wanted to know about prognosis. We discussed few to many weeks and this is what he thought as well. This will allow him to plan for things that are important to him. As per discussion 5/30/18  Follow up discussion today with spouse  Plan is to continue GIP level of care as long as it is needed  If pt is ever more comfortable to go home we will make it happen, however very difficult situation with his pain as it is not under control. We also discussed keeping him here as this seems best for him and his family as his medications have to be given frequently and using the IV push dosing. As per discussion on 6/1/18  Followed up with family, offered support and instructed them on the use of the IV fentanyl for continuous dosing which was ordered from [facility] and will be initiated soon, we will continue with bolus dosing as needed. Pt is to remain at [facility]. As per discussion today with family:   6/2/18  Pt is actively dying and has exacerbated end of life pain and moaning and groaning, discussed this with family.  Started phenobarbital and versed in addition to increasing the dose of fentanyl continuous 175 mcg HR increased to 250 mcghr  Followed up with family again. Pt has intractable moaning and groaning with each breath, spent over 4 hours attempting to control symptoms with high doses of phenobarbital, versed, lorazepam, fentanyl. Pt started to show signs of calming down in the late afternoon to early evening. Very difficult time for family. 6/3/18  Pt obtunded, actively dying high doses of phenobarbital, versed, lorazepam, fentanyl given scheduled. Prn and continuous drip, finally more comfortable this morning, family reports periodic episodes of moaning when moved or hiccups, but currently peaceful. 6/4/28  Pt more comfortable today but all meds are given IV as he cannot take po. Still on continuous fentanyl at 250mcg/hr, versed, Ativan, and phenobarbital.    Disposition: [facility] given intractable pain and care needs    Prognosis estimated based on 06/04/18 clinical assessment is:   [x] Hours to Days    [] Days to Weeks    [] Other:    Communicated plan of care with: Hospice Case Manager; Hospice IDT; Care Team     GOALS OF CARE     Resuscitation Status: DNR  Durable DNR: [x] Yes [] No    Advance Care Planning 3/23/2018   Patient's Healthcare Decision Maker is: Named in scanned ACP document   Primary Decision Maker Name Earnest Felder   Primary Decision Maker Phone Number [phone]   Primary Decision Maker Relationship to Patient Spouse   Confirm Advance Directive Yes, on file   Does the patient have other document types Power of       HISTORY     History obtained from: chart, patient and wife    CHIEF COMPLAINT: abdominal pain  The patient is:   [] Verbal  [] Nonverbal  [x] Unresponsive    HPI/SUBJECTIVE:    Patient with above mentioned history.            REVIEW OF SYSTEMS     The following systems were: [x] reviewed  [] unable to be reviewed    Positive ROS include:   Constitutional: fatigue, weakness, in pain  Ears/nose/mouth/throat:   Respiratory:   Gastrointestinal:poor appetite, nausea, vomiting, abdominal pain  Musculoskeletal:leg swelling but no difference/worse  Neurologic:  Psychiatric:  Endocrine:     Adult Non-Verbal Pain Assessment Score:   5/10    Face  [] 0   No particular expression or smile  [x] 1   Occasional grimace, tearing, frowning, wrinkled forehead  [] 2   Frequent grimace, tearing, frowning, wrinkled forehead    Activity (movement)  [x] 0   Lying quietly, normal position  [] 1   Seeking attention through movement or slow, cautious movement  [] 2   Restless, excessive activity and/or withdrawal reflexes    Guarding  [x] 0   Lying quietly, no positioning of hands over areas of body  [] 1   Splinting areas of the body, tense  [] 2   Rigid, stiff    Physiology (vital signs)  [] 0   Stable vital signs  [] 1   Change in any of the following: SBP > 20mm Hg; HR > 20/minute  [x] 2   Change in any of the following: SBP > 30mm Hg; HR > 25/minute    Respiratory  [] 0   Baseline RR/SpO2, compliant with ventilator  [] 1   RR > 10 above baseline, or 5% drop SpO2, mild asynchrony with ventilator  [x] 2   RR > 20 above baseline, or 10% drop SpO2, asynchrony with ventilator     FUNCTIONAL ASSESSMENT     Palliative Performance Scale (PPS): 10%     PSYCHOSOCIAL/SPIRITUAL ASSESSMENT     Active Problems:    Intractable abdominal pain (5/26/2018)      Anxiety about health (5/26/2018)      Anorexia (5/26/2018)      Esophageal cancer, stage IV ([facility] [address] (5/26/2018)      Retroperitoneal fibrosis (5/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    Allergies   Allergen Reactions    Levaquin [Levofloxacin] Nausea and Vomiting    Xanax [Alprazolam] Other (comments)     Possible delirium      Current Facility-Administered Medications   Medication Dose Route Frequency    PHENobarbital (LUMINAL) injection 65 mg  65 mg IntraVENous Q4H PRN    LORazepam (ATIVAN) injection 4 mg  4 mg IntraVENous Q15MIN PRN    midazolam (VERSED) injection 5 mg (Patient Supplied)  5 mg IntraVENous Q15MIN PRN    Fentanyl 50 mcg/ml in 200 ml NS (Patient Supplied)   Continuous Infusion CONTINUOUS    glycopyrrolate (ROBINUL) injection 0.2 mg  0.2 mg IntraVENous Q2H PRN    fentaNYL citrate (PF) injection 200 mcg  200 mcg IntraVENous Q30MIN PRN    LORazepam (ATIVAN) injection 4 mg  4 mg IntraVENous Q4H    midazolam (VERSED) injection 5 mg (Patient Supplied)  5 mg IntraVENous Q4H    PHENobarbital (LUMINAL) injection 65 mg  65 mg IntraVENous Q6H    chlorproMAZINE (THORAZINE) 25 mg in sodium chloride (NS) 25 mL ivpb  25 mg IntraVENous Q4H PRN    scopolamine (TRANSDERM-SCOP) 1 mg over 3 days 1 Patch  1 Patch TransDERmal Q72H    acetaminophen (TYLENOL) tablet 650 mg  650 mg Oral Q4H PRN    ondansetron (ZOFRAN) injection 8 mg  8 mg IntraVENous Q4H PRN        PHYSICAL EXAM     Wt Readings from Last 3 Encounters:   12/13/17 82.8 kg (182 lb 9.6 oz)   11/08/17 82.8 kg (182 lb 9.6 oz)   04/08/11 95.2 kg (209 lb 14.1 oz)       Visit Vitals    BP (!) 88/50 (BP 1 Location: Left arm)    Pulse (!) 112    Temp 98 °F (36.7 °C)    Resp 16    SpO2 (!) 79%       Supplemental O2  [] Yes  [x] NO  Last bowel movement:     Currently this patient has:  [] Peripheral IV [] PICC  [x] PORT [] ICD    [] Menjivar Catheter [] NG Tube   [] PEG Tube    [] Rectal Tube [] Drain  [] Other:  B/l nephrostomy tubes, ileostomy     Constitutional: cachectic, ill appearing, very pale  Eyes: clear  ENMT: moist, clean  Cardiovascular: reg heart rate  Respiratory: nonlabored respirations  Gastrointestinal: ileostomy bag with stool, nephrostomy tubes draining  Musculoskeletal: no deformities, edema 3+ right/2+ left, scrotal edema  Skin:dry, very pale  Neurologic: fatigue   Psychiatric: moaning and groaning all day    Pertinent Lab and or Imaging Tests:  Lab Results   Component Value Date/Time    Sodium 135 05/18/2018 02:09 PM    Potassium 5.0 05/18/2018 02:09 PM    Chloride 91 (L) 05/18/2018 02:09 PM    CO2 31 (H) 05/18/2018 02:09 PM    Glucose 109 (H) 05/18/2018 02:09 PM    BUN 23 05/18/2018 02:09 PM    Creatinine 1.75 (H) 05/18/2018 02:09 PM    BUN/Creatinine ratio 13 05/18/2018 02:09 PM    GFR est AA 48 (L) 05/18/2018 02:09 PM    GFR est non-AA 42 (L) 05/18/2018 02:09 PM    Calcium 9.0 05/18/2018 02:09 PM     Lab Results   Component Value Date/Time    Protein, total 5.9 (L) 05/18/2018 02:09 PM    Albumin 3.2 (L) 05/18/2018 02:09 PM           Total time: 45  min   Counseling / coordination time: 25 min   > 50% counseling / coordination?: yes  Cinthya Green NP

## 2018-06-04 NOTE — HOSPICE
Visit with wife this morning to provide support as she luis enrique with 's imminent death. Very appreciative of [facility] support/services. She is well supported by large family.   Continue to follow

## 2018-06-04 NOTE — PROGRESS NOTES
On call MSW made prn visit on pt who remains at [facility] level of care. Three family members were present at patients bedside. Family had no concerns or needs and stated that today is much better for them as patients pain is more controlled today. Patients wife shared that she had a question but was unable to recall what her question was and was unsure whether the question had already been answered. Family expressed appreciation for MSW visit.

## 2018-06-04 NOTE — PROGRESS NOTES
Problem: End of Life Process  Goal: Demonstrate understanding of end of life processes  Patient/caregiver will understand end of life processes. Outcome: Progressing Towards Goal  Educated family on the s/s of end of life and the process that the patient will go through; book, \"Gone From My Sight\" has been provided previously and family has read. Patient is transitioning and is comfortable.

## 2018-06-04 NOTE — PROGRESS NOTES
[facility] SW/CH VISIT      [facility] Hospice Chaplain follow-up Visit     This AM, I visited the room of this patient who is @ [facility] on GIP status. He was in bed, his eyes closed and did not respond to vocal stimulation. He was not agitated, and by the end of my visit, slightly restless.  His wife, Dioni Flores, his mother, and his daughters were with him.    I delivered the photograph which [place] of Delilah's and Paul's hands displaying their wedding rings. On the back of the picture. Dioni Flores was very pleased with the frame and the note from the staff. The family continues in dumont mode as they have accepted that the patient is likely to continue his medical/physical decline. Dioni Flores was appreciative for my visit and continuing Yolandendmayela Izaiah again spoke of self-care and coping mechanisms, as well as stressed the need to take breaks. This morning, I provided a ministry of presence, active listening to Dioni Flores and the daughters of the patient, offered words of comfort, assurance, supplemental spiritual encouragement, as well as offered a prayer. Dioni Flores asked me to visit whenever I could. I reiterated she could reach me if needed or desired. GOALS:  Continue to visit this patient and his family and  provide pastoral care and supplemental spiritual support to assist both the patient and family to better cope with both the pain and this decline. Build trust and familiarity with the patient and the family to encourage a discussion of their spiritual thoughts and concerns at a deeper and more personal level if they so choose. Validate the emotions and normalize the anticipatory grief of the family regarding this declining situation. Coordinate with his local [employer] if so requested. PLAN:   Continue to visit this patient and his family, while he is at the [facility] and I am in this facility, or PRN as requested. Coordinate with [employer] as requested. Coordinate with Care Team on POC.     VISIT FREQUENCY:   1 wk. 2 starting 5/30 plus 4 prn 14 days.    Gricelda Dalton, [address]  [phone]

## 2018-06-05 NOTE — PROGRESS NOTES
1900: Shift report received from Blanquita Lujan, [address]: Pt in bed, unresponsive with regular, non-labored breathing at this time. Lungs very diminished, bowel sounds hypoactive. Checked pump, Fentanyl infusing continuously at 250 microgramms per hour. Scheduled med administered. Spoke to family about findings. Pt appears comfortable at this time. 2100: Pt resting with family at the bedside. 2150: Pt has hiccups, some irregular breathing noted. Other wise appears comfortable. 2255: Medicated with scheduled meds. Bed bath given. Pt had liquid, brown discharge form nose. Pt flaccid. Groin/abdominal area is hardened, scrotum hardened and hands contracted and unable to releases fingers from half-closed position. 0010: Medicated with scheduled meds, breathing elevated at 24 bpm at this time, will monitor. 0120: Pt's breathing is irregular and at 20 breaths per minute, displaying a labored breathing pattern. Medicated with PRN Ativan 4 mg after speaking to family about findings. 0300: Medicated with PRN Fentanyl 200 mcg for labored breathing at 26 bpm. Pt lets out single moan intermittently. Scheduled Ativan administered. 0400: Medicated with scheduled meds and PRN Fentanyl 200 mcg for unrelieved respirations that are labored and intermittent moaning. 3050: Pt is moaning intermittently, breathing pattern has not changed and still labored. Medicated with PRN Ativan and PRN Fentanyl. 5447: PRN Fentanyl and PRN Midazolam administered for s/s of labored breathing and intermittent moaning. Pt has episodes of apnea of about 10 seconds. 1334: Pt had ca 50 seconds of apnea and gasping. Stayed with family and pt. Pts breathing starts to become more rapid after a few minutes. 6545: Medicated with scheduled meds, breathing no longer gasping, but pt is very pale and lips turning white.       NAME OF PATIENT:  Jeronimo Mon    LEVEL OF CARE:  GIP    REASON FOR GIP:   Pain, despite numerous changes in medications, Medication adjustment that must be monitored 24/7 and Stabilizing treatment that cannot take place at home    *PATIENT REMAINS ELIGIBLE FOR GIP LEVEL OF CARE AS EVIDENCED BY: (MUST BE ADDRESSED OF PATIENT GIP)    O2 SAFETY:  n/a    FALL INTERVENTIONS PROVIDED:   Implemented/recommended resources for alarm system (personal alarm, bed alarm, call bell, etc.)  and Implemented/recommended environmental changes (remove hazards, lower bed, improve lighting, etc.)    INTERDISPLINARY COMMUNICATION/COLLABORATION:  Physician, MSW, [place] and RN, CNA    NEW MEDICATION INITIATION DOCUMENTATION:  no changes made on night shift    COMFORTABLE DYING MEASURE:  Is Patient/family satisfied with symptom level?  yes    DISCHARGE PLAN:  eol at the [facility]

## 2018-06-05 NOTE — PROGRESS NOTES
Hospice Chaplain Death Visit    I received notification that this patient had passed. I met with his CM and Dr. Cristóbal Welch at the [place] [address] to receive an update on the family. I then visited the room and met with Dean Davison, the wife of the patient, their two daughters, his brother in law, Asia Bates, who is the Perla of the [employer] [place], and his wife. I offered condolences, sympathy, as well as validated their rocco and the blessing of love which they gave to Sharon Aranda throughout. They were quiet and tearful, but also relieved that this journey for their loved one had transitioned. Asia Bates lead a service of committal and petitionary prayers and blessings for the patient and his family. All expressed gratitude for the care and support the patient and each of them had received at the [facility]. Bereavement Risk: low to moderate given ages of daughters.   Meghan [place], [facility]

## 2018-06-05 NOTE — PROGRESS NOTES
0700  Report received. 0720  Pt's respirations very shallow with long periods of apnea. Family at the bedside. 0730  Pt without pulse or respirations. Pronounced at 0730. FAmily grieving appropriately. Rn offered support and encouraged them to spend as much time as they needed. Additional family on the way. Family is using [facility] in [place]. 0900  [place]   0930 Pt's family left the [facility]. Family grieving appropriately. .    1030  Pt's body picked up.

## 2018-06-13 NOTE — DISCHARGE SUMMARY
Discharge Summary    [address] [place] [address]  [facility]  [phone]      Date of Admission: 5/25/2018  Date of Discharge: 6/5/2018    William Multani is a 60y.o. 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 Pt was admitted for [facility] level care. Per HPI:  William Multani is a 60y.o. year old who was admitted to [address] [place] [address].      Patient has a history of poly fibrosis syndrome including contractures, keloids, retroperitoneal fibrosis) and esophageal cancer with liver mets diagnosed in 2016. He has retroperitoneal and pelvic fibrosis resulting in ureteral obstruction and left stent then placement of bilateral nephrostomy tubes with recent replacement of tube in early May. He also has DVT related to cancer and immobility. He was receiving chemotherapy as recent as April 2018. He has been suffering from increasing abdominal distention, anasarca and intractable pain. Patient declined paracentesis, left nephrostomy tube replaced at hospital yesterday and then patient transferred to [facility] for pain control and high need of care.     Functionally, the patient's Karnofsky and/or Palliative Performance Scale has declined over a period of 4 months and is estimated at 50%. The patient is dependent on all ADLs     Objective information that support this patients limited prognosis includes: intractable pain, rising creatinine in the setting of ureteral obstruction, anasarca in the setting of lymphatic obstruction and nutritional decline     The patient/family chose comfort measures with the support of Hospice.      HOSPICE DIAGNOSES   Active Symptoms:  1. Intractable pain  2. Anxiety related to pain  3. Severe debility  4. Poor po intake      PLAN   1.  Abdominal pain- Continue Fentanyl 100 mcg every 4 hours scheduled along with 100 mcg every 1 hour as needed for pain                        - Continue Oxycontin 160 mg three times a day                        - Continue Roxicodone 30 mg every 4 hours as needed. - If prn Fentanyl is unavailable, patient can have Fentanyl 100 mcg sublingual spray x 2 and if this does not bring his pain level down by at least 2 points, then administer 400 mcg fentanyl spray after 1 hour.                        - Future choice of Fentanyl spray should be 400 mcg.        2. Pain crisis- in severe pain crisis, administer Ativan 0.5 mg IV. 3. Patient or wife can refuse scheduled IV ativan based on his comfort  4. Spent time with wife separately reviewing events of the last few days and plan of care. 5.  and SW to support family needs  6. Disposition: [facility] given intractable pain and care needs          The patient's care was focused on comfort and the patient passed away on 6/5/2018.

## 2021-04-10 NOTE — TELEPHONE ENCOUNTER
From: Carina Wheeler  Sent: 4/11/2018 12:35 PM EDT  To: [facility] Nurses  Subject: RE:talking today    Marine Rodriguez went for labs this morning at 10:30 and my sister in law said afterward, he started feeling so bad he asked to go home instead of to [address]. Bad stomach pains, ostomy bag filling and rectal passage of material as well. Cramping and pressure. She said he's sound asleep now. Haven't gotten the labs from today yet. Also, both of his feet and calves have been blue off and on in the past few days. When you push on the blue, it turns white/pink and then goes back to blue. Not painful. I think I'll try to upload his labs from Monday for you and Dr. Aishwarya Iyer so you can review and I'll do the same when I get the ones from today. There are other lab markers that were also more off than usual. Will email again with lab info from today.  ----- Message -----  From: Casey Martinez RN  Sent: 4/10/2018 2:44 PM EDT  To: Carina Wheeler  Subject: RE:talking today    Thanks Thana Kanner for the update. I will be interested to see what the labs show tomorrow. Nuria Larose        ----- Message -----   From: Carina Wheeler   Sent: 4/10/2018 1:52 PM EDT   To: Casey Martinez RN  Subject: RE:talking today    Elisha Hung. Update for you to pass along if you don't mind. Paul's tumor markers went down again! CEA went from 3103 to 2743. CA 19.9 went from 1162 to 1041. Bigger jumps than last time, so good news. Bad news: Creatinine went up to 2.3 from 1.93 2 weeks ago and Dr. Marisela Peacock couldn't approve chemo today. They are doing IV hydration and he'll go back tomorrow to be retested. Hoping he's just a bit dehydrated, but when you think about the blood in his urine plus the increasing creatinine, we'll have to pay attention for sure. Tomorrow should help tell the story whether this is a one time blip or a trend. I'll keep you guys posted.   ----- Message -----  From: Casey Martinez RN  Sent: 4/2/2018 10:46 AM EDT  To: Carina Wheeler  Subject: RE:talking today    Thanks sounds good I will chart this and pass it along to Dr. Wilton Acosta as well. Thanks,  Bhargavi Pete    ----- Message -----   From: Ivet Buchanan   Sent: 4/2/2018 10:42 AM EDT   To: Kristin Danielle RN  Subject: RE:talking today    Talked to [facility] and she was 100% in agreement that they should both be done while hes an inpatient even if theyre 2 separate procedures. Farrah Martinez is out this week too, so shell talk to him next week. In the meantime, Ill cancel the procedure to remove the stent at [facility] on the 17th. Thanks Kolby Huston.  ----- Message -----  From: Kristin Danielle RN  Sent: 3/27/2018 4:47 PM EDT  To: Ivet Buchanan  Subject: RE:talking today    Mane Gutierrez did not send a culture from what I can tell. She is out of town this week but I can send her an email tomorrow and double check. I will let you know what she say. Thanks,  Kolby Huston    ----- Message -----   From: Ivet Buchanan   Sent: 3/27/2018 4:33 PM EDT   To: Kristni Danielle RN  Subject: RE:talking today    Marisa called Paul's urologist office and never could get through. When she did, she got a huge run around and never could talk to a nurse. So I is putting him on Cipro with instructions to call if he's worse at any point or not better by Thurs. They also sent the urine out for culture. I told her I thought Magdalena Daniels did some sort of culture on the nephrostomy sites when she was out with Dr. Wilton Acosta last week. Do you know if she did and if so, what the results were? If she did, can you let me know the results? [facility] wants to know and I can pass along to the urologist (if we ever get in touch with them!). Thanks, Kolby Huston. Take care!  ----- Message -----  From: Kristin Danielle RN  Sent: 3/27/2018 2:50 PM EDT  To: Ivet Buchanan  Subject: RE:talking today    Thanks for letting me know! Hope the treatment goes well. I will be waiting to hear.     Thanks.      ----- Message -----   From: Ivet Buchanan   Sent: 3/27/2018 2:18 PM EDT   To: David Richards Piter Leroy RN  Subject: RE:talking today    Sohan Bains. At chemo now. Paul's CEA came down a bit again and his CA19.9 went up just slightly. Marisa said the increase was so small, it's basically steady from last time, so she feels this is good news. I'll get the exact #'s for you. However, Paul's urine labs showed all sorts of issues: red blood cells too high, keytones (I think), too much protein and a bacteria. She is talking to the NP to see if he needs to go on antibiotics or see the urologist. But he has no fever or new pain. This morning his urine was pure red/blood - much worse looking than yesterday. I showed Marisa a picture of it. Creatinine is 1.9 (about same as always). WBC is 4.3, so it's low but still ok for chemo. Will keep you posted on what they decide.  ----- Message -----  From: Herbert Smith RN  Sent: 3/27/2018 10:09 AM EDT  To: Otoniel Kemp  Subject: RE:talking today    Yue Phillips,     How are you doing today? I am glad he is up and moving around, that is really good for him. The best (ideal) way to get a urine is directly from the tube, but if that is not possible and the doctor okays it being obtained from the bag then it can be done, it is just not sterile. Let me know how the chemo goes today. Thanks,   Mohini Meier    ----- Message -----   From: Otoniel Kemp   Sent: 3/26/2018 3:26 PM EDT   To: Herbert Smith RN  Subject: RE:talking today    Well, Mohini Meier, would you believe me if I told you that after we got labs done today, Brandy Bazan wanted to go in to the [employer] himself to get a couple of lottery tickets, and now he's tinkering around in the garage and jump starting his car? I am flabbergasted, this time in a good way. He's been going downstairs at least once per day and getting a snack or whatever, but then going right back upstairs. Still, I think just doing that is helping to build up his strength again. But he said he had very little pressure and pain today and just feels like being up.  Thanks be to God. Chemo tomorrow if labs are ok. I did talk [facility] into doing a urine sample for the side that has blood in it. Please pass along that report to Dr. Will Samuel. Take care!  ----- Message -----  From: Grace Reed LPN  Sent: 0/72/5384 9:42 AM EDT  To: Verona Mcgee  Subject: RE:talking today    Good morning pedrito is out the office today, however we have received the information and the documents have been added to his medical record. Thank You and have a great day!    ----- Message -----   From: Verona Mcgee   Sent: 3/23/2018 9:03 AM EDT   To: Carla Mosley RN  Subject: RE:talking today    Allyson Mcdowell. Dr. Will Samuel asked me to attempt to attach [facility] medical directive and POA here for your records. Let's see if it works. Good visit with Dr. Will Samuel yesterday. Very helpful, as always. Take care and have a good weekend! Nidia Marykerri  ----- Message -----  From: Carla Mosley RN  Sent: 3/22/2018 10:48 AM EDT  To: Verona Mcgee  Subject: RE:talking today    Nidia Armstrong, I know this is hard. I am sure his has lost body mass like we talked about. It is probably not your imagination. I will not be seeing Dr. Will Samuel but she will ask you enough questions and you will be able to share with her all of this. Remember the chemo is cumulative and side effects get a little worse each treatment. So what you see is some of that, and all the other stuff added together. ----- Message -----   From: Verona Mcgee   Sent: 3/22/2018 10:25 AM EDT   To: Carla Mosley RN  Subject: RE:talking today    ThanksDoug. It is indeed a roller coaster. Also want her to know (I can tell her when she gets there if you don't talk to her before then), that Miguelangel Brunner asked me to message Dr. Rosa Reynaga and ask her if he could go back to 100% chemo strength next week (vs the 75% he's on now). I did as he asked me, but I told her I understood she's not here and can't assess whether he's strong enough. I told her maybe Dr. Will Samuel could be her eyes on the ground here. Haven't heard back from her yet, but I'm not sure what she or Dr. Rashi Dtoson or Dr. Nicolette Vigil would think about that. .. And one more thing. I swear his shoulders and chest are skin and bone. Seems worse to me, but maybe it's my imagination.  ----- Message -----  From: Mark Stockton RN  Sent: 3/22/2018 8:35 AM EDT  To: Adam Olea  Subject: RE:talking today    Sanam Metcalfe,    I sent this to Dr. Rashi Dotson. It is just an up and down situation for sure. It can keep you in a tailspin. It was so cold yesterday and that takes a lot of energy. When you are feeling good you think you can conquer the world. It could just be all the added activity of the day. I am glad Dr. Rashi Dotson is coming today. She will guide you!!!   I don't mean that as negative. She just will be able to guide you. ..haha. Stay tough like I know you are and let me know what you need. Tigist Damian    ----- Message -----   From: Adam Olea   Sent: 3/21/2018 4:44 PM EDT   To: Mark Stockton RN  Subject: RE:talking today    Tigist Damian, just as soon as I tell you everything is better, today has been rough. Pressure is worse, swelling in right leg is worse. I have the lymphedema machine on him now. Of course the trip to do the echocardiogram was hard on him, but he was hurting even before that. He's pretty down and disappointed. Just wanted Dr. Rashi Dotson to know that before she comes tomorrow. Thanks so much! Sanam Serrato  ----- Message -----  From: Mark Stockton RN  Sent: 3/21/2018 8:30 AM EDT  To: Adam Olea  Subject: RE:talking today    Arturo Berry,  I will let Dr. Rashi Dotson know this for sure. Be safe in the snow. Tigist Damian    ----- Message -----   From: Adam Olea   Sent: 3/21/2018 8:23 AM EDT   To: Mark Stockton RN  Subject: RE:talking today    Maryuri Ko. I wondered if you would pass along a question for Dr. Rashi Dotson. I'm struggling with the reason Den Velazquez doesn't get out of bed except to go to the bathroom (with the exception of Sunday).  He reports that he's feeling better, the pressure is better, but he still doesn't get out of bed much. When I ask him why, he's sort of vague. He usually says the pressure gets worse when he gets up (which it does), but I told him if he was up to it, maybe he could just walk up and down the upstairs ray a couple of times a day to try to regain some strength, but he has no interest in doing that. I thought maybe she could probe for this when she comes out. Thanks and take care!  ----- Message -----  From: Alessandra Bauman RN  Sent: 3/19/2018 11:13 AM EDT  To: Gabi Gu  Subject: RE:talking today    Hi Mable Stephanie!! What a great way to spend your Sunday! Thanks for the update and the good news report! Aleisha Zambrano      ----- Message -----   From: Gabi Gu   Sent: 3/19/2018 11:00 AM EDT   To: Alessandra Bauman RN  Subject: RE:talking today    Jessica Bermudez. Chika Ruiz had a good weekend. So much so that I stepped out for a few hours Sunday and when I came back, he had washed his own hair in the sink, gotten dressed and was sitting on the deck soaking in some sun! He stayed out there for over an hour and visited with me, then visited with a coworker that brought food, then back to bed. But the pressure is better, he's in good spirits and says he feels \"pretty good\". 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 :) Called the urologist. They said to put Neosporin on the site for a couple of days and if not better, to come in to be seen. Chika Ruiz has a nominal temp of 99.3 to 99.6 the last couple of days, but this is not unusual for him. I'll keep an eye on it.  ----- Message -----  From: Alessandra Bauman RN  Sent: 3/16/2018 12:45 PM EDT  To: Gabi Gu  Subject: RE:talking today    Eligio Batista,  Like you and I have been saying, make every day the best day. I am so glad for all the positive things that are going on for him.  It sounds as though he has had a lot of activity over the past couple of days so not surprising he is resting today, and he if was up with pain during the night. Thanks for the update. I hope your weekend goes well. I look forward to hearing from you at the beginning of the week. Christo Morelos    ----- Message -----   From: Gita Garcia   Sent: 3/16/2018 12:31 PM EDT   To: Jailyn Knapp RN  Subject: RE:talking today    Wendy Davis is still doing well, especially for chemo week! Got his pump off yesterday & he asked to go for a drive downtown to see a [employer] that's being built. Hasn't wanted to do anything like that in ages. His appetite is great, he's been sleeping less, joking around more. Pressure is there but better. Right leg a bit more swollen, so we did the lymphedema machine. His upper body is truly skin and bones.  Seems worse to 10-Apr-2021 19:57